# Patient Record
Sex: MALE | Race: WHITE | NOT HISPANIC OR LATINO | Employment: FULL TIME | URBAN - METROPOLITAN AREA
[De-identification: names, ages, dates, MRNs, and addresses within clinical notes are randomized per-mention and may not be internally consistent; named-entity substitution may affect disease eponyms.]

---

## 2024-08-13 ENCOUNTER — HOSPITAL ENCOUNTER (INPATIENT)
Facility: HOSPITAL | Age: 39
LOS: 7 days | Discharge: HOME | End: 2024-08-20
Attending: EMERGENCY MEDICINE | Admitting: STUDENT IN AN ORGANIZED HEALTH CARE EDUCATION/TRAINING PROGRAM
Payer: COMMERCIAL

## 2024-08-13 ENCOUNTER — APPOINTMENT (OUTPATIENT)
Dept: CT IMAGING | Facility: HOSPITAL | Age: 39
End: 2024-08-13
Payer: COMMERCIAL

## 2024-08-13 DIAGNOSIS — E87.6 HYPOKALEMIA: ICD-10-CM

## 2024-08-13 DIAGNOSIS — F10.930 ALCOHOL WITHDRAWAL SEIZURE WITHOUT COMPLICATION: Primary | ICD-10-CM

## 2024-08-13 DIAGNOSIS — E83.42 HYPOMAGNESEMIA: ICD-10-CM

## 2024-08-13 DIAGNOSIS — I48.91 ATRIAL FIBRILLATION WITH RAPID VENTRICULAR RESPONSE: ICD-10-CM

## 2024-08-13 DIAGNOSIS — R56.9 ALCOHOL WITHDRAWAL SEIZURE WITHOUT COMPLICATION: Primary | ICD-10-CM

## 2024-08-13 PROBLEM — F10.939 ALCOHOL WITHDRAWAL SEIZURE: Status: ACTIVE | Noted: 2024-08-13

## 2024-08-13 PROBLEM — F10.10 ETOH ABUSE: Status: ACTIVE | Noted: 2024-08-13

## 2024-08-13 LAB
ALBUMIN SERPL-MCNC: 3.7 G/DL (ref 3.5–5.2)
ALBUMIN/GLOB SERPL: 1.2 G/DL
ALP SERPL-CCNC: 98 U/L (ref 39–117)
ALT SERPL W P-5'-P-CCNC: 63 U/L (ref 1–41)
AMPHET+METHAMPHET UR QL: NEGATIVE
ANION GAP SERPL CALCULATED.3IONS-SCNC: 18 MMOL/L (ref 5–15)
ANION GAP SERPL CALCULATED.3IONS-SCNC: 9.6 MMOL/L (ref 5–15)
AST SERPL-CCNC: 84 U/L (ref 1–40)
BARBITURATES UR QL SCN: NEGATIVE
BASOPHILS # BLD AUTO: 0.02 10*3/MM3 (ref 0–0.2)
BASOPHILS NFR BLD AUTO: 0.3 % (ref 0–1.5)
BENZODIAZ UR QL SCN: NEGATIVE
BILIRUB SERPL-MCNC: 1.2 MG/DL (ref 0–1.2)
BUN SERPL-MCNC: 10 MG/DL (ref 6–20)
BUN SERPL-MCNC: 11 MG/DL (ref 6–20)
BUN/CREAT SERPL: 12.5 (ref 7–25)
BUN/CREAT SERPL: 13.4 (ref 7–25)
CALCIUM SPEC-SCNC: 8.7 MG/DL (ref 8.6–10.5)
CALCIUM SPEC-SCNC: 9.3 MG/DL (ref 8.6–10.5)
CANNABINOIDS SERPL QL: NEGATIVE
CHLORIDE SERPL-SCNC: 94 MMOL/L (ref 98–107)
CHLORIDE SERPL-SCNC: 99 MMOL/L (ref 98–107)
CK SERPL-CCNC: 232 U/L (ref 20–200)
CO2 SERPL-SCNC: 22 MMOL/L (ref 22–29)
CO2 SERPL-SCNC: 23.4 MMOL/L (ref 22–29)
COCAINE UR QL: NEGATIVE
CREAT SERPL-MCNC: 0.8 MG/DL (ref 0.76–1.27)
CREAT SERPL-MCNC: 0.82 MG/DL (ref 0.76–1.27)
DEPRECATED RDW RBC AUTO: 44.2 FL (ref 37–54)
EGFRCR SERPLBLD CKD-EPI 2021: 113.9 ML/MIN/1.73
EGFRCR SERPLBLD CKD-EPI 2021: 114.7 ML/MIN/1.73
EOSINOPHIL # BLD AUTO: 0.05 10*3/MM3 (ref 0–0.4)
EOSINOPHIL NFR BLD AUTO: 0.8 % (ref 0.3–6.2)
ERYTHROCYTE [DISTWIDTH] IN BLOOD BY AUTOMATED COUNT: 12.4 % (ref 12.3–15.4)
ETHANOL BLD-MCNC: <10 MG/DL (ref 0–10)
ETHANOL UR QL: <0.01 %
FENTANYL UR-MCNC: NEGATIVE NG/ML
GLOBULIN UR ELPH-MCNC: 3.2 GM/DL
GLUCOSE BLDC GLUCOMTR-MCNC: 121 MG/DL (ref 70–130)
GLUCOSE BLDC GLUCOMTR-MCNC: 136 MG/DL (ref 70–130)
GLUCOSE BLDC GLUCOMTR-MCNC: 137 MG/DL (ref 70–130)
GLUCOSE BLDC GLUCOMTR-MCNC: 146 MG/DL (ref 70–130)
GLUCOSE BLDC GLUCOMTR-MCNC: 146 MG/DL (ref 70–130)
GLUCOSE BLDC GLUCOMTR-MCNC: 160 MG/DL (ref 70–130)
GLUCOSE SERPL-MCNC: 160 MG/DL (ref 65–99)
GLUCOSE SERPL-MCNC: 215 MG/DL (ref 65–99)
HCT VFR BLD AUTO: 44.5 % (ref 37.5–51)
HGB BLD-MCNC: 15.1 G/DL (ref 13–17.7)
IMM GRANULOCYTES # BLD AUTO: 0.03 10*3/MM3 (ref 0–0.05)
IMM GRANULOCYTES NFR BLD AUTO: 0.5 % (ref 0–0.5)
LYMPHOCYTES # BLD AUTO: 0.79 10*3/MM3 (ref 0.7–3.1)
LYMPHOCYTES NFR BLD AUTO: 12.9 % (ref 19.6–45.3)
MAGNESIUM SERPL-MCNC: 1.2 MG/DL (ref 1.6–2.6)
MCH RBC QN AUTO: 32.8 PG (ref 26.6–33)
MCHC RBC AUTO-ENTMCNC: 33.9 G/DL (ref 31.5–35.7)
MCV RBC AUTO: 96.7 FL (ref 79–97)
METHADONE UR QL SCN: NEGATIVE
MONOCYTES # BLD AUTO: 0.75 10*3/MM3 (ref 0.1–0.9)
MONOCYTES NFR BLD AUTO: 12.3 % (ref 5–12)
NEUTROPHILS NFR BLD AUTO: 4.47 10*3/MM3 (ref 1.7–7)
NEUTROPHILS NFR BLD AUTO: 73.2 % (ref 42.7–76)
OPIATES UR QL: NEGATIVE
OXYCODONE UR QL SCN: NEGATIVE
PLATELET # BLD AUTO: 57 10*3/MM3 (ref 140–450)
PMV BLD AUTO: 10.9 FL (ref 6–12)
POTASSIUM SERPL-SCNC: 3.4 MMOL/L (ref 3.5–5.2)
POTASSIUM SERPL-SCNC: 4.4 MMOL/L (ref 3.5–5.2)
PROT SERPL-MCNC: 6.9 G/DL (ref 6–8.5)
QT INTERVAL: 319 MS
QT INTERVAL: 381 MS
QTC INTERVAL: 464 MS
QTC INTERVAL: 487 MS
RBC # BLD AUTO: 4.6 10*6/MM3 (ref 4.14–5.8)
SODIUM SERPL-SCNC: 132 MMOL/L (ref 136–145)
SODIUM SERPL-SCNC: 134 MMOL/L (ref 136–145)
WBC NRBC COR # BLD AUTO: 6.11 10*3/MM3 (ref 3.4–10.8)

## 2024-08-13 PROCEDURE — 80307 DRUG TEST PRSMV CHEM ANLYZR: CPT | Performed by: EMERGENCY MEDICINE

## 2024-08-13 PROCEDURE — 25010000002 THIAMINE HCL 200 MG/2ML SOLUTION 2 ML VIAL: Performed by: STUDENT IN AN ORGANIZED HEALTH CARE EDUCATION/TRAINING PROGRAM

## 2024-08-13 PROCEDURE — 82550 ASSAY OF CK (CPK): CPT | Performed by: EMERGENCY MEDICINE

## 2024-08-13 PROCEDURE — 83735 ASSAY OF MAGNESIUM: CPT | Performed by: EMERGENCY MEDICINE

## 2024-08-13 PROCEDURE — 85025 COMPLETE CBC W/AUTO DIFF WBC: CPT | Performed by: EMERGENCY MEDICINE

## 2024-08-13 PROCEDURE — 25010000002 ENOXAPARIN PER 10 MG: Performed by: STUDENT IN AN ORGANIZED HEALTH CARE EDUCATION/TRAINING PROGRAM

## 2024-08-13 PROCEDURE — 93010 ELECTROCARDIOGRAM REPORT: CPT | Performed by: INTERNAL MEDICINE

## 2024-08-13 PROCEDURE — 25010000002 LORAZEPAM PER 2 MG: Performed by: STUDENT IN AN ORGANIZED HEALTH CARE EDUCATION/TRAINING PROGRAM

## 2024-08-13 PROCEDURE — 82948 REAGENT STRIP/BLOOD GLUCOSE: CPT

## 2024-08-13 PROCEDURE — 70450 CT HEAD/BRAIN W/O DYE: CPT

## 2024-08-13 PROCEDURE — 25010000002 LORAZEPAM PER 2 MG: Performed by: EMERGENCY MEDICINE

## 2024-08-13 PROCEDURE — 82077 ASSAY SPEC XCP UR&BREATH IA: CPT | Performed by: EMERGENCY MEDICINE

## 2024-08-13 PROCEDURE — 63710000001 INSULIN GLARGINE PER 5 UNITS: Performed by: STUDENT IN AN ORGANIZED HEALTH CARE EDUCATION/TRAINING PROGRAM

## 2024-08-13 PROCEDURE — 25010000002 LEVETRIRACETAM PER 10 MG: Performed by: PSYCHIATRY & NEUROLOGY

## 2024-08-13 PROCEDURE — 99291 CRITICAL CARE FIRST HOUR: CPT

## 2024-08-13 PROCEDURE — 90791 PSYCH DIAGNOSTIC EVALUATION: CPT | Performed by: SOCIAL WORKER

## 2024-08-13 PROCEDURE — 80053 COMPREHEN METABOLIC PANEL: CPT | Performed by: STUDENT IN AN ORGANIZED HEALTH CARE EDUCATION/TRAINING PROGRAM

## 2024-08-13 PROCEDURE — 25010000002 PHENOBARBITAL PER 120 MG: Performed by: STUDENT IN AN ORGANIZED HEALTH CARE EDUCATION/TRAINING PROGRAM

## 2024-08-13 PROCEDURE — 25810000003 SODIUM CHLORIDE 0.9 % SOLUTION: Performed by: EMERGENCY MEDICINE

## 2024-08-13 PROCEDURE — 72125 CT NECK SPINE W/O DYE: CPT

## 2024-08-13 PROCEDURE — 25010000002 MAGNESIUM SULFATE IN D5W 1G/100ML (PREMIX) 1-5 GM/100ML-% SOLUTION: Performed by: STUDENT IN AN ORGANIZED HEALTH CARE EDUCATION/TRAINING PROGRAM

## 2024-08-13 PROCEDURE — 36415 COLL VENOUS BLD VENIPUNCTURE: CPT

## 2024-08-13 PROCEDURE — 93005 ELECTROCARDIOGRAM TRACING: CPT | Performed by: EMERGENCY MEDICINE

## 2024-08-13 PROCEDURE — 99222 1ST HOSP IP/OBS MODERATE 55: CPT | Performed by: PSYCHIATRY & NEUROLOGY

## 2024-08-13 RX ORDER — ACYCLOVIR 400 MG/1
TABLET ORAL
COMMUNITY
Start: 2024-07-17

## 2024-08-13 RX ORDER — LORAZEPAM 1 MG/1
1 TABLET ORAL
Status: ACTIVE | OUTPATIENT
Start: 2024-08-13 | End: 2024-08-18

## 2024-08-13 RX ORDER — DULOXETINE HCL 20 MG
1 CAPSULE,DELAYED RELEASE (ENTERIC COATED) ORAL EVERY 12 HOURS SCHEDULED
Status: ON HOLD | COMMUNITY
End: 2024-08-14

## 2024-08-13 RX ORDER — MAGNESIUM SULFATE 1 G/100ML
1 INJECTION INTRAVENOUS
Status: COMPLETED | OUTPATIENT
Start: 2024-08-13 | End: 2024-08-13

## 2024-08-13 RX ORDER — NICOTINE 21 MG/24HR
1 PATCH, TRANSDERMAL 24 HOURS TRANSDERMAL EVERY 24 HOURS
Status: DISCONTINUED | OUTPATIENT
Start: 2024-08-13 | End: 2024-08-20 | Stop reason: HOSPADM

## 2024-08-13 RX ORDER — LEVETIRACETAM 500 MG/1
1 TABLET ORAL EVERY 12 HOURS SCHEDULED
Status: ON HOLD | COMMUNITY
Start: 2024-07-08 | End: 2024-08-14

## 2024-08-13 RX ORDER — NICOTINE POLACRILEX 4 MG
15 LOZENGE BUCCAL
Status: DISCONTINUED | OUTPATIENT
Start: 2024-08-13 | End: 2024-08-20 | Stop reason: HOSPADM

## 2024-08-13 RX ORDER — FUROSEMIDE 40 MG
1 TABLET ORAL DAILY
Status: ON HOLD | COMMUNITY
End: 2024-08-14

## 2024-08-13 RX ORDER — POLYETHYLENE GLYCOL 3350 17 G/17G
17 POWDER, FOR SOLUTION ORAL DAILY PRN
Status: DISCONTINUED | OUTPATIENT
Start: 2024-08-13 | End: 2024-08-20 | Stop reason: HOSPADM

## 2024-08-13 RX ORDER — IBUPROFEN 600 MG/1
1 TABLET ORAL
Status: DISCONTINUED | OUTPATIENT
Start: 2024-08-13 | End: 2024-08-20 | Stop reason: HOSPADM

## 2024-08-13 RX ORDER — BISACODYL 5 MG/1
5 TABLET, DELAYED RELEASE ORAL DAILY PRN
Status: DISCONTINUED | OUTPATIENT
Start: 2024-08-13 | End: 2024-08-20 | Stop reason: HOSPADM

## 2024-08-13 RX ORDER — SODIUM CHLORIDE 0.9 % (FLUSH) 0.9 %
10 SYRINGE (ML) INJECTION EVERY 12 HOURS SCHEDULED
Status: DISCONTINUED | OUTPATIENT
Start: 2024-08-13 | End: 2024-08-20 | Stop reason: HOSPADM

## 2024-08-13 RX ORDER — PHENOBARBITAL SODIUM 65 MG/ML
65 INJECTION, SOLUTION INTRAMUSCULAR; INTRAVENOUS ONCE
Status: COMPLETED | OUTPATIENT
Start: 2024-08-14 | End: 2024-08-14

## 2024-08-13 RX ORDER — LEVOTHYROXINE SODIUM 112 UG/1
112 TABLET ORAL DAILY
COMMUNITY
Start: 2024-06-19

## 2024-08-13 RX ORDER — PREGABALIN 100 MG/1
300 CAPSULE ORAL 2 TIMES DAILY
Status: DISCONTINUED | OUTPATIENT
Start: 2024-08-13 | End: 2024-08-20 | Stop reason: HOSPADM

## 2024-08-13 RX ORDER — LEVETIRACETAM 250 MG/1
250 TABLET ORAL 2 TIMES DAILY
Status: DISCONTINUED | OUTPATIENT
Start: 2024-08-15 | End: 2024-08-15

## 2024-08-13 RX ORDER — LANOLIN ALCOHOL/MO/W.PET/CERES
1000 CREAM (GRAM) TOPICAL DAILY
Status: ON HOLD | COMMUNITY
End: 2024-08-14

## 2024-08-13 RX ORDER — LEVOTHYROXINE SODIUM 112 UG/1
112 TABLET ORAL DAILY
Status: DISCONTINUED | OUTPATIENT
Start: 2024-08-13 | End: 2024-08-20 | Stop reason: HOSPADM

## 2024-08-13 RX ORDER — MULTIPLE VITAMINS W/ MINERALS TAB 9MG-400MCG
1 TAB ORAL DAILY
Status: DISCONTINUED | OUTPATIENT
Start: 2024-08-13 | End: 2024-08-20 | Stop reason: HOSPADM

## 2024-08-13 RX ORDER — PHENOBARBITAL 32.4 MG/1
32.4 TABLET ORAL ONCE
Status: DISCONTINUED | OUTPATIENT
Start: 2024-08-16 | End: 2024-08-15

## 2024-08-13 RX ORDER — ONDANSETRON 4 MG/1
4 TABLET, ORALLY DISINTEGRATING ORAL EVERY 6 HOURS PRN
Status: DISCONTINUED | OUTPATIENT
Start: 2024-08-13 | End: 2024-08-20 | Stop reason: HOSPADM

## 2024-08-13 RX ORDER — BISACODYL 10 MG
10 SUPPOSITORY, RECTAL RECTAL DAILY PRN
Status: DISCONTINUED | OUTPATIENT
Start: 2024-08-13 | End: 2024-08-20 | Stop reason: HOSPADM

## 2024-08-13 RX ORDER — SODIUM CHLORIDE 0.9 % (FLUSH) 0.9 %
10 SYRINGE (ML) INJECTION AS NEEDED
Status: DISCONTINUED | OUTPATIENT
Start: 2024-08-13 | End: 2024-08-20 | Stop reason: HOSPADM

## 2024-08-13 RX ORDER — POTASSIUM CHLORIDE 750 MG/1
40 TABLET, FILM COATED, EXTENDED RELEASE ORAL ONCE
Status: COMPLETED | OUTPATIENT
Start: 2024-08-13 | End: 2024-08-13

## 2024-08-13 RX ORDER — LORAZEPAM 2 MG/ML
2 INJECTION INTRAMUSCULAR
Status: DISPENSED | OUTPATIENT
Start: 2024-08-13 | End: 2024-08-18

## 2024-08-13 RX ORDER — CLINDAMYCIN HCL 300 MG
CAPSULE ORAL
Status: ON HOLD | COMMUNITY
Start: 2024-06-26 | End: 2024-08-14

## 2024-08-13 RX ORDER — NITROGLYCERIN 0.4 MG/1
0.4 TABLET SUBLINGUAL
Status: DISCONTINUED | OUTPATIENT
Start: 2024-08-13 | End: 2024-08-20 | Stop reason: HOSPADM

## 2024-08-13 RX ORDER — MELOXICAM 15 MG/1
15 TABLET ORAL DAILY
Status: ON HOLD | COMMUNITY
Start: 2024-06-25 | End: 2024-08-14

## 2024-08-13 RX ORDER — THIAMINE HYDROCHLORIDE 100 MG/ML
200 INJECTION, SOLUTION INTRAMUSCULAR; INTRAVENOUS EVERY 8 HOURS SCHEDULED
Status: DISCONTINUED | OUTPATIENT
Start: 2024-08-16 | End: 2024-08-18

## 2024-08-13 RX ORDER — AMOXICILLIN 250 MG
2 CAPSULE ORAL 2 TIMES DAILY PRN
Status: DISCONTINUED | OUTPATIENT
Start: 2024-08-13 | End: 2024-08-13 | Stop reason: SDUPTHER

## 2024-08-13 RX ORDER — SITAGLIPTIN 50 MG/1
50 TABLET, FILM COATED ORAL DAILY
COMMUNITY
Start: 2024-08-05

## 2024-08-13 RX ORDER — AMOXICILLIN 250 MG
2 CAPSULE ORAL 2 TIMES DAILY PRN
Status: DISCONTINUED | OUTPATIENT
Start: 2024-08-13 | End: 2024-08-20 | Stop reason: HOSPADM

## 2024-08-13 RX ORDER — SODIUM CHLORIDE 9 MG/ML
40 INJECTION, SOLUTION INTRAVENOUS AS NEEDED
Status: DISCONTINUED | OUTPATIENT
Start: 2024-08-13 | End: 2024-08-20 | Stop reason: HOSPADM

## 2024-08-13 RX ORDER — FUROSEMIDE 20 MG
20 TABLET ORAL DAILY
Status: DISCONTINUED | OUTPATIENT
Start: 2024-08-13 | End: 2024-08-20 | Stop reason: HOSPADM

## 2024-08-13 RX ORDER — SPIRONOLACTONE 50 MG/1
1 TABLET, FILM COATED ORAL DAILY
COMMUNITY
Start: 2024-07-28

## 2024-08-13 RX ORDER — ENOXAPARIN SODIUM 100 MG/ML
40 INJECTION SUBCUTANEOUS DAILY
Status: DISCONTINUED | OUTPATIENT
Start: 2024-08-13 | End: 2024-08-20 | Stop reason: HOSPADM

## 2024-08-13 RX ORDER — PHENOBARBITAL 32.4 MG/1
32.4 TABLET ORAL ONCE
Status: DISCONTINUED | OUTPATIENT
Start: 2024-08-15 | End: 2024-08-15

## 2024-08-13 RX ORDER — LEVETIRACETAM 500 MG/5ML
500 INJECTION, SOLUTION, CONCENTRATE INTRAVENOUS EVERY 12 HOURS SCHEDULED
Status: COMPLETED | OUTPATIENT
Start: 2024-08-13 | End: 2024-08-14

## 2024-08-13 RX ORDER — BISACODYL 10 MG
10 SUPPOSITORY, RECTAL RECTAL DAILY PRN
Status: DISCONTINUED | OUTPATIENT
Start: 2024-08-13 | End: 2024-08-13 | Stop reason: SDUPTHER

## 2024-08-13 RX ORDER — BISACODYL 5 MG/1
5 TABLET, DELAYED RELEASE ORAL DAILY PRN
Status: DISCONTINUED | OUTPATIENT
Start: 2024-08-13 | End: 2024-08-13 | Stop reason: SDUPTHER

## 2024-08-13 RX ORDER — SODIUM CHLORIDE 9 MG/ML
100 INJECTION, SOLUTION INTRAVENOUS CONTINUOUS
Status: DISCONTINUED | OUTPATIENT
Start: 2024-08-13 | End: 2024-08-13

## 2024-08-13 RX ORDER — FOLIC ACID 1 MG/1
1 TABLET ORAL DAILY
Status: DISCONTINUED | OUTPATIENT
Start: 2024-08-13 | End: 2024-08-20 | Stop reason: HOSPADM

## 2024-08-13 RX ORDER — SPIRONOLACTONE 50 MG/1
50 TABLET, FILM COATED ORAL DAILY
Status: DISCONTINUED | OUTPATIENT
Start: 2024-08-13 | End: 2024-08-20 | Stop reason: HOSPADM

## 2024-08-13 RX ORDER — PREGABALIN 225 MG/1
1 CAPSULE ORAL EVERY 24 HOURS
Status: ON HOLD | COMMUNITY
End: 2024-08-14

## 2024-08-13 RX ORDER — AMITRIPTYLINE HYDROCHLORIDE 10 MG/1
10 TABLET ORAL NIGHTLY
Status: DISCONTINUED | OUTPATIENT
Start: 2024-08-13 | End: 2024-08-20 | Stop reason: HOSPADM

## 2024-08-13 RX ORDER — LORAZEPAM 2 MG/ML
1 INJECTION INTRAMUSCULAR
Status: DISPENSED | OUTPATIENT
Start: 2024-08-13 | End: 2024-08-18

## 2024-08-13 RX ORDER — DEXTROSE MONOHYDRATE 25 G/50ML
25 INJECTION, SOLUTION INTRAVENOUS
Status: DISCONTINUED | OUTPATIENT
Start: 2024-08-13 | End: 2024-08-20 | Stop reason: HOSPADM

## 2024-08-13 RX ORDER — NORTRIPTYLINE HCL 10 MG
10 CAPSULE ORAL NIGHTLY
COMMUNITY
Start: 2024-07-30

## 2024-08-13 RX ORDER — INSULIN ASPART 100 [IU]/ML
INJECTION, SOLUTION INTRAVENOUS; SUBCUTANEOUS
COMMUNITY
Start: 2024-07-31

## 2024-08-13 RX ORDER — METOPROLOL TARTRATE 25 MG/1
25 TABLET, FILM COATED ORAL ONCE
Status: COMPLETED | OUTPATIENT
Start: 2024-08-13 | End: 2024-08-13

## 2024-08-13 RX ORDER — AMITRIPTYLINE HYDROCHLORIDE 10 MG/1
10 TABLET ORAL NIGHTLY
Status: ON HOLD | COMMUNITY
Start: 2024-07-19 | End: 2024-08-15

## 2024-08-13 RX ORDER — PREGABALIN 300 MG/1
300 CAPSULE ORAL 2 TIMES DAILY
COMMUNITY

## 2024-08-13 RX ORDER — INSULIN GLARGINE 100 [IU]/ML
INJECTION, SOLUTION SUBCUTANEOUS
COMMUNITY

## 2024-08-13 RX ORDER — LORAZEPAM 2 MG/ML
1 INJECTION INTRAMUSCULAR ONCE
Status: COMPLETED | OUTPATIENT
Start: 2024-08-13 | End: 2024-08-13

## 2024-08-13 RX ORDER — LORAZEPAM 1 MG/1
2 TABLET ORAL
Status: ACTIVE | OUTPATIENT
Start: 2024-08-13 | End: 2024-08-18

## 2024-08-13 RX ORDER — CLONIDINE HYDROCHLORIDE 0.1 MG/1
0.1 TABLET ORAL EVERY 4 HOURS PRN
Status: DISCONTINUED | OUTPATIENT
Start: 2024-08-13 | End: 2024-08-20 | Stop reason: HOSPADM

## 2024-08-13 RX ORDER — POLYETHYLENE GLYCOL 3350 17 G/17G
17 POWDER, FOR SOLUTION ORAL DAILY PRN
Status: DISCONTINUED | OUTPATIENT
Start: 2024-08-13 | End: 2024-08-13 | Stop reason: SDUPTHER

## 2024-08-13 RX ORDER — ACETAMINOPHEN 325 MG/1
650 TABLET ORAL EVERY 4 HOURS PRN
Status: DISCONTINUED | OUTPATIENT
Start: 2024-08-13 | End: 2024-08-20 | Stop reason: HOSPADM

## 2024-08-13 RX ORDER — FUROSEMIDE 20 MG
20 TABLET ORAL DAILY
COMMUNITY
Start: 2024-06-21

## 2024-08-13 RX ORDER — SPIRONOLACTONE 100 MG/1
1 TABLET, FILM COATED ORAL DAILY
Status: ON HOLD | COMMUNITY
End: 2024-08-14

## 2024-08-13 RX ORDER — ONDANSETRON 2 MG/ML
4 INJECTION INTRAMUSCULAR; INTRAVENOUS EVERY 6 HOURS PRN
Status: DISCONTINUED | OUTPATIENT
Start: 2024-08-13 | End: 2024-08-20 | Stop reason: HOSPADM

## 2024-08-13 RX ADMIN — THIAMINE HYDROCHLORIDE 500 MG: 100 INJECTION, SOLUTION INTRAMUSCULAR; INTRAVENOUS at 07:12

## 2024-08-13 RX ADMIN — LORAZEPAM 2 MG: 2 INJECTION INTRAMUSCULAR; INTRAVENOUS at 11:20

## 2024-08-13 RX ADMIN — LORAZEPAM 2 MG: 2 INJECTION INTRAMUSCULAR; INTRAVENOUS at 13:27

## 2024-08-13 RX ADMIN — ENOXAPARIN SODIUM 40 MG: 100 INJECTION SUBCUTANEOUS at 15:26

## 2024-08-13 RX ADMIN — THIAMINE HYDROCHLORIDE 500 MG: 100 INJECTION, SOLUTION INTRAMUSCULAR; INTRAVENOUS at 21:12

## 2024-08-13 RX ADMIN — INSULIN GLARGINE 10 UNITS: 100 INJECTION, SOLUTION SUBCUTANEOUS at 20:34

## 2024-08-13 RX ADMIN — LORAZEPAM 2 MG: 2 INJECTION INTRAMUSCULAR; INTRAVENOUS at 10:29

## 2024-08-13 RX ADMIN — THIAMINE HYDROCHLORIDE 500 MG: 100 INJECTION, SOLUTION INTRAMUSCULAR; INTRAVENOUS at 16:54

## 2024-08-13 RX ADMIN — LORAZEPAM 2 MG: 2 INJECTION INTRAMUSCULAR; INTRAVENOUS at 12:17

## 2024-08-13 RX ADMIN — POTASSIUM CHLORIDE 40 MEQ: 750 TABLET, EXTENDED RELEASE ORAL at 03:53

## 2024-08-13 RX ADMIN — LEVETIRACETAM 500 MG: 100 INJECTION INTRAVENOUS at 20:34

## 2024-08-13 RX ADMIN — METOPROLOL TARTRATE 25 MG: 25 TABLET, FILM COATED ORAL at 03:57

## 2024-08-13 RX ADMIN — Medication 10 ML: at 20:38

## 2024-08-13 RX ADMIN — METOPROLOL TARTRATE 5 MG: 1 INJECTION, SOLUTION INTRAVENOUS at 03:58

## 2024-08-13 RX ADMIN — PHENOBARBITAL SODIUM 219.1 MG: 65 INJECTION, SOLUTION INTRAMUSCULAR; INTRAVENOUS at 15:26

## 2024-08-13 RX ADMIN — PHENOBARBITAL SODIUM 291.9 MG: 65 INJECTION, SOLUTION INTRAMUSCULAR; INTRAVENOUS at 11:51

## 2024-08-13 RX ADMIN — LORAZEPAM 2 MG: 2 INJECTION INTRAMUSCULAR; INTRAVENOUS at 19:37

## 2024-08-13 RX ADMIN — LORAZEPAM 2 MG: 2 INJECTION INTRAMUSCULAR; INTRAVENOUS at 23:05

## 2024-08-13 RX ADMIN — MAGNESIUM SULFATE IN DEXTROSE 1 G: 10 INJECTION, SOLUTION INTRAVENOUS at 10:12

## 2024-08-13 RX ADMIN — POTASSIUM CHLORIDE 40 MEQ: 750 TABLET, EXTENDED RELEASE ORAL at 08:04

## 2024-08-13 RX ADMIN — LORAZEPAM 2 MG: 2 INJECTION INTRAMUSCULAR; INTRAVENOUS at 10:06

## 2024-08-13 RX ADMIN — LORAZEPAM 1 MG: 2 INJECTION INTRAMUSCULAR; INTRAVENOUS at 03:11

## 2024-08-13 RX ADMIN — LORAZEPAM 2 MG: 2 INJECTION INTRAMUSCULAR; INTRAVENOUS at 17:20

## 2024-08-13 RX ADMIN — MAGNESIUM SULFATE IN DEXTROSE 1 G: 10 INJECTION, SOLUTION INTRAVENOUS at 12:40

## 2024-08-13 RX ADMIN — MAGNESIUM SULFATE IN DEXTROSE 1 G: 10 INJECTION, SOLUTION INTRAVENOUS at 07:17

## 2024-08-13 RX ADMIN — PHENOBARBITAL SODIUM 219.1 MG: 65 INJECTION, SOLUTION INTRAMUSCULAR; INTRAVENOUS at 20:32

## 2024-08-13 RX ADMIN — SODIUM CHLORIDE 1000 ML: 9 INJECTION, SOLUTION INTRAVENOUS at 03:14

## 2024-08-13 NOTE — ED NOTES
Nursing report ED to floor  Anthony Lerma  39 y.o.  male    HPI :  HPI (Adult)  Stated Reason for Visit: To ER via EMS from [facility].  Sent for seizure.  Nurse was next door and heard pt fall out of bed.  Pt appears to have bit tongue.  At [facility] for detox from ETOH.  Last drink 1 week ago.  Admitted on 8/12 to the [facility].     C/o slight pain to head from fall.    Chief Complaint  Chief Complaint   Patient presents with    Seizures       Admitting doctor:   Hayden Sol MD    Admitting diagnosis:   The primary encounter diagnosis was Alcohol withdrawal seizure without complication. Diagnoses of Hypokalemia, Hypomagnesemia, and Atrial fibrillation with rapid ventricular response were also pertinent to this visit.    Code status:   Current Code Status       Date Active Code Status Order ID Comments User Context       8/13/2024 0733 CPR (Attempt to Resuscitate) [id number]  Nicole Leblanc APRN ED        Question Answer    Code Status (Patient has no pulse and is not breathing) CPR (Attempt to Resuscitate)    Medical Interventions (Patient has pulse or is breathing) Full                    Allergies:   Patient has no known allergies.    Isolation:   No active isolations    Intake and Output    Intake/Output Summary (Last 24 hours) at 8/13/2024 1337  Last data filed at 8/13/2024 1201  Gross per 24 hour   Intake 1400 ml   Output --   Net 1400 ml       Weight:       08/13/24  0226   Weight: 96.6 kg (213 lb)       Most recent vitals:   Vitals:    08/13/24 0947 08/13/24 1028 08/13/24 1147 08/13/24 1210   BP:  (!) 161/101 153/93 151/96   BP Location:       Patient Position:       Pulse: 75 81 100 82   Resp:       Temp:       SpO2: 94% 96% 95% 94%   Weight:       Height:           Active LDAs/IV Access:   Lines, Drains & Airways       Active LDAs       Name Placement date Placement time Site Days    Peripheral IV 08/13/24 1128 Right Antecubital 08/13/24  1128  Antecubital  less than 1    Peripheral IV 08/13/24 1240  Anterior;Left Forearm 08/13/24  1240  Forearm  less than 1                    Labs (abnormal labs have a star):   Labs Reviewed   COMPREHENSIVE METABOLIC PANEL - Abnormal; Notable for the following components:       Result Value    Glucose 215 (*)     Sodium 134 (*)     Potassium 3.4 (*)     Chloride 94 (*)     ALT (SGPT) 63 (*)     AST (SGOT) 84 (*)     Anion Gap 18.0 (*)     All other components within normal limits    Narrative:     GFR Normal >60  Chronic Kidney Disease <60  Kidney Failure <15     CK - Abnormal; Notable for the following components:    Creatine Kinase 232 (*)     All other components within normal limits   CBC WITH AUTO DIFFERENTIAL - Abnormal; Notable for the following components:    Platelets 57 (*)     Lymphocyte % 12.9 (*)     Monocyte % 12.3 (*)     All other components within normal limits   MAGNESIUM - Abnormal; Notable for the following components:    Magnesium 1.2 (*)     All other components within normal limits   POCT GLUCOSE FINGERSTICK - Abnormal; Notable for the following components:    Glucose 160 (*)     All other components within normal limits   POCT GLUCOSE FINGERSTICK - Abnormal; Notable for the following components:    Glucose 137 (*)     All other components within normal limits   URINE DRUG SCREEN - Normal    Narrative:     Negative Thresholds Per Drugs Screened:    Amphetamines                 500 ng/ml  Barbiturates                 200 ng/ml  Benzodiazepines              100 ng/ml  Cocaine                      300 ng/ml  Methadone                    300 ng/ml  Opiates                      300 ng/ml  Oxycodone                    100 ng/ml  THC                           50 ng/ml  Fentanyl                       5 ng/ml      The Normal Value for all drugs tested is negative. This report includes final unconfirmed screening results to be used for medical treatment purposes only. Unconfirmed results must not be used for non-medical purposes such as employment or legal testing.  Clinical consideration should be applied to any drug of abuse test, particularly when unconfirmed results are used.           ETHANOL   POCT GLUCOSE FINGERSTICK   POCT GLUCOSE FINGERSTICK   POCT GLUCOSE FINGERSTICK   POCT GLUCOSE FINGERSTICK   CBC AND DIFFERENTIAL    Narrative:     The following orders were created for panel order CBC & Differential.  Procedure                               Abnormality         Status                     ---------                               -----------         ------                     CBC Auto Differential[[id number]]        Abnormal            Final result                 Please view results for these tests on the individual orders.       EKG:   ECG 12 Lead Rhythm Change   Final Result   HEART RATE=89  bpm   RR Lwevjyxv=003  ms   MO Jaxzhegq=418  ms   P Horizontal Axis=-19  deg   P Front Axis=40  deg   QRSD Interval=91  ms   QT Yaqjnywq=538  ms   ONuO=250  ms   QRS Axis=33  deg   T Wave Axis=40  deg   - ABNORMAL ECG -   Sinus rhythm   Left atrial enlargement   Low voltage, precordial leads   Atrial fibrillation has resolved   Electronically Signed By: Myesha Kohli ([facility]) 2024-08-13 10:25:05   Date and Time of Study:2024-08-13 05:17:43      ECG 12 Lead QT Measurement   Final Result   HEART IFHU=478  bpm   RR Jwerrlbe=386  ms   MO Interval=  ms   P Horizontal Axis=  deg   P Front Axis=  deg   QRSD Interval=92  ms   QT Zmxavcnb=978  ms   TJvW=855  ms   QRS Axis=42  deg   T Wave Axis=33  deg   - ABNORMAL ECG -   Atrial fibrillation   Abnormal R-wave progression, late transition   Borderline  prolonged QT interval   No change from previous tracing   Electronically Signed By: Myesha Kohli ([facility]) 2024-08-13 10:25:15   Date and Time of Study:2024-08-13 02:52:56          Meds given in ED:   Medications   sodium chloride 0.9 % flush 10 mL (has no administration in time range)   Magnesium Standard Dose Replacement - Follow Nurse / BPA Driven Protocol (has no administration in time  range)   thiamine (B-1) 500 mg in sodium chloride 0.9 % 100 mL IVPB (0 mg Intravenous Stopped 8/13/24 0809)     Followed by   thiamine (B-1) injection 200 mg (has no administration in time range)     Followed by   thiamine (VITAMIN B-1) tablet 100 mg (has no administration in time range)   folic acid (FOLVITE) tablet 1 mg (has no administration in time range)   nitroglycerin (NITROSTAT) SL tablet 0.4 mg (has no administration in time range)   sennosides-docusate (PERICOLACE) 8.6-50 MG per tablet 2 tablet (has no administration in time range)     And   polyethylene glycol (MIRALAX) packet 17 g (has no administration in time range)     And   bisacodyl (DULCOLAX) EC tablet 5 mg (has no administration in time range)     And   bisacodyl (DULCOLAX) suppository 10 mg (has no administration in time range)   ondansetron ODT (ZOFRAN-ODT) disintegrating tablet 4 mg (has no administration in time range)     Or   ondansetron (ZOFRAN) injection 4 mg (has no administration in time range)   Potassium Replacement - Follow Nurse / BPA Driven Protocol (has no administration in time range)   acetaminophen (TYLENOL) tablet 650 mg (has no administration in time range)   cloNIDine (CATAPRES) tablet 0.1 mg (has no administration in time range)   multivitamin with minerals 1 tablet (has no administration in time range)   LORazepam (ATIVAN) tablet 1 mg ( Oral Not Given:  See Alt 8/13/24 1327)     Or   LORazepam (ATIVAN) injection 1 mg ( Intravenous Not Given:  See Alt 8/13/24 1327)     Or   LORazepam (ATIVAN) tablet 2 mg ( Oral Not Given:  See Alt 8/13/24 1327)     Or   LORazepam (ATIVAN) injection 2 mg ( Intravenous Not Given:  See Alt 8/13/24 1327)     Or   LORazepam (ATIVAN) injection 2 mg (2 mg Intravenous Given 8/13/24 1327)     Or   LORazepam (ATIVAN) injection 2 mg ( Intramuscular Not Given:  See Alt 8/13/24 1327)   sodium chloride 0.9 % flush 10 mL (has no administration in time range)   sodium chloride 0.9 % flush 10 mL (has no  administration in time range)   sodium chloride 0.9 % infusion 40 mL (has no administration in time range)   Potassium Replacement - Follow Nurse / BPA Driven Protocol (has no administration in time range)   Magnesium Standard Dose Replacement - Follow Nurse / BPA Driven Protocol (has no administration in time range)   Phosphorus Replacement - Follow Nurse / BPA Driven Protocol (has no administration in time range)   Calcium Replacement - Follow Nurse / BPA Driven Protocol (has no administration in time range)   Enoxaparin Sodium (LOVENOX) syringe 40 mg (has no administration in time range)   nitroglycerin (NITROSTAT) SL tablet 0.4 mg (has no administration in time range)   nicotine (NICODERM CQ) 21 MG/24HR patch 1 patch (has no administration in time range)   nicotine polacrilex (NICORETTE) gum 4 mg (has no administration in time range)   levothyroxine (SYNTHROID, LEVOTHROID) tablet 112 mcg (has no administration in time range)   spironolactone (ALDACTONE) tablet 50 mg ( Oral Dose Auto Held 8/21/24 0900)   furosemide (LASIX) tablet 20 mg ( Oral Dose Auto Held 8/21/24 0900)   pregabalin (LYRICA) capsule 300 mg (has no administration in time range)   insulin glargine (LANTUS, SEMGLEE) injection 10 Units (has no administration in time range)   amitriptyline (ELAVIL) tablet 10 mg ( Oral Dose Auto Held 8/21/24 2100)   PHENobarbital 291.9 mg in sodium chloride 0.9 % 100 mL IVPB (0 mg Intravenous Stopped 8/13/24 1201)     Followed by   PHENobarbital 219.1 mg in sodium chloride 0.9 % 100 mL IVPB (has no administration in time range)     Followed by   PHENobarbital 219.1 mg in sodium chloride 0.9 % 100 mL IVPB (has no administration in time range)   PHENobarbital injection 65 mg (has no administration in time range)     Followed by   PHENobarbital injection 65 mg (has no administration in time range)     Followed by   PHENobarbital tablet 32.4 mg (has no administration in time range)     Followed by   PHENobarbital tablet  32.4 mg (has no administration in time range)     Followed by   PHENobarbital tablet 32.4 mg (has no administration in time range)   dextrose (GLUTOSE) oral gel 15 g (has no administration in time range)   dextrose (D50W) (25 g/50 mL) IV injection 25 g (has no administration in time range)   glucagon (GLUCAGEN) injection 1 mg (has no administration in time range)   insulin regular (humuLIN R,novoLIN R) injection 2-9 Units ( Subcutaneous Not Given 8/13/24 1254)   sodium chloride 0.9 % bolus 1,000 mL (0 mL Intravenous Stopped 8/13/24 0809)   LORazepam (ATIVAN) injection 1 mg (1 mg Intravenous Given 8/13/24 0311)   potassium chloride (K-DUR,KLOR-CON) ER tablet 40 mEq (40 mEq Oral Given 8/13/24 0353)   metoprolol tartrate (LOPRESSOR) injection 5 mg (5 mg Intravenous Given 8/13/24 0358)   metoprolol tartrate (LOPRESSOR) tablet 25 mg (25 mg Oral Given 8/13/24 0357)   potassium chloride (K-DUR,KLOR-CON) ER tablet 40 mEq (40 mEq Oral Given 8/13/24 0804)   magnesium sulfate in D5W 1g/100mL (PREMIX) (1 g Intravenous New Bag 8/13/24 1240)       Imaging results:  CT Cervical Spine Without Contrast    Result Date: 8/13/2024  Electronically signed by Álvaro Rutledge MD on 08-13-24 at 0515    CT Head Without Contrast    Result Date: 8/13/2024  Electronically signed by Álvaro Rutledge MD on 08-13-24 at 0514     Ambulatory status:   - bedrest    Social issues:   Social History     Socioeconomic History    Marital status:        Peripheral Neurovascular  Peripheral Neurovascular (Adult)  Peripheral Neurovascular WDL: WDL    Neuro Cognitive  Neuro Cognitive (Adult)  Cognitive/Neuro/Behavioral WDL: WDL, orientation  Orientation: oriented x 4  Jm Coma Scale  Best Eye Response: 4-->(E4) spontaneous  Best Motor Response: 6-->(M6) obeys commands  Best Verbal Response: 5-->(V5) oriented  Jm Coma Scale Score: 15  Seizure Assessment  Seizure Activity: witnessed  Seizure Presentation: stiffening, tonic rigidity  Seizure  Areas Involved: generalized  Seizure Movement: tonic, clonic    Learning  Learning Assessment (Adult)  Learning Readiness and Ability: no barriers identified  Education Provided  Person Taught: patient  Teaching Method: verbal instruction  Teaching Focus: symptom/problem overview, diagnostic test  Education Outcome Evaluation: verbalizes understanding    Respiratory  Respiratory WDL  Respiratory WDL: WDL, rhythm/pattern  Rhythm/Pattern, Respiratory: unlabored, pattern regular, depth regular, no shortness of breath reported    Abdominal Pain       Pain Assessments  Pain (Adult)  (0-10) Pain Rating: Rest: 3  Pain Location: head    NIH Stroke Scale       Bryna Sam RN  08/13/24 13:37 EDT

## 2024-08-13 NOTE — H&P
Patient Name:  Anthony Lerma  YOB: 1985  MRN:  [id number]  Admit Date:  8/13/2024  Patient Care Team:  Provider, No Known as PCP - General      Subjective   History Present Illness     Chief Complaint   Patient presents with    Seizures     This is a 39-year-old male with a past medical history of cirrhosis, type 2 diabetes, hypothyroidism presents the hospital after experiencing a seizure in the middle of the night.  He has been drinking quite often, but never really gave me a straight answer about how much he has been drinking.  He told his wife that he needed assistance with alcohol cessation and he was taken to the [facility] yesterday to detox from alcohol.  That is when he had a seizure and he was brought to the ER for further management.  His wife reports that his last drink was approximately 36 hours ago.    Despite ativan and phenobarbital he remains very agitated.       Personal History     History reviewed. No pertinent past medical history.  History reviewed. No pertinent surgical history.  History reviewed. No pertinent family history.     No current facility-administered medications on file prior to encounter.     Current Outpatient Medications on File Prior to Encounter   Medication Sig Dispense Refill    amitriptyline (ELAVIL) 10 MG tablet Take 1 tablet by mouth Every Night.      clindamycin (CLEOCIN) 300 MG capsule       Continuous Glucose Sensor (Dexcom G7 Sensor) misc APPLY ONE SENSOR TOPICALLY EVERY 10 DAYS      furosemide (LASIX) 20 MG tablet Take 1 tablet by mouth Daily.      insulin aspart (NovoLOG FlexPen) 100 UNIT/ML solution pen-injector sc pen 5 UNITS BEFORE SUPPER (IF B/G GREATER THAN 200 USE 5 UNITS FOR EACH 50MG ABOVE 200, MAX 50 UNITS DAILY.      Januvia 50 MG tablet Take 1 tablet by mouth Daily.      Lantus SoloStar 100 UNIT/ML injection pen INJECT 8 TO 12 UNITS SUBCUTANEOUSLY ONCE DAILY      levothyroxine (SYNTHROID, LEVOTHROID) 112 MCG tablet Take 1 tablet by mouth  Daily.      nortriptyline (PAMELOR) 10 MG capsule Take 1 capsule by mouth Daily.      pregabalin (LYRICA) 100 MG capsule Take 3 capsules by mouth 2 (Two) Times a Day.      pregabalin (Lyrica) 225 MG capsule 1 capsule Daily.      spironolactone (ALDACTONE) 50 MG tablet Take 1 tablet by mouth Daily.      Cymbalta 20 MG capsule 1 capsule Every 12 (Twelve) Hours.      furosemide (Lasix) 40 MG tablet Take 1 tablet by mouth Daily.      levETIRAcetam (KEPPRA) 500 MG tablet Take 1 tablet by mouth Every 12 (Twelve) Hours.      meloxicam (MOBIC) 15 MG tablet Take 1 tablet by mouth Daily.      spironolactone (ALDACTONE) 100 MG tablet Take 1 tablet by mouth Daily.      vitamin B-12 (CYANOCOBALAMIN) 1000 MCG tablet Take 1 tablet by mouth Daily.       No Known Allergies    Objective    Objective     Vital Signs  Temp:  [98.7 °F (37.1 °C)] 98.7 °F (37.1 °C)  Heart Rate:  [] 81  Resp:  [18-19] 18  BP: (137-161)/() 161/101  SpO2:  [91 %-96 %] 96 %  on  Flow (L/min):  [3] 3;   Device (Oxygen Therapy): room air  Body mass index is 30.56 kg/m².    Physical Exam  Constitutional:       General: He is in acute distress.      Appearance: He is ill-appearing.   HENT:      Head: Normocephalic and atraumatic.   Eyes:      Extraocular Movements: Extraocular movements intact.      Pupils: Pupils are equal, round, and reactive to light.   Cardiovascular:      Rate and Rhythm: Normal rate and regular rhythm.   Pulmonary:      Effort: Pulmonary effort is normal. No respiratory distress.   Abdominal:      General: There is no distension.      Palpations: Abdomen is soft.      Tenderness: There is no abdominal tenderness.   Skin:     Comments: Plethoric face   Neurological:      Mental Status: He is alert and oriented to person, place, and time.      Cranial Nerves: No cranial nerve deficit.         Results Review:  I reviewed the patient's new clinical results.  I reviewed the patient's new imaging results and agree with the  interpretation.  I reviewed the patient's other test results and agree with the interpretation  I personally viewed and interpreted the patient's EKG/Telemetry data  Discussed with ED provider.    Lab Results (last 24 hours)       Procedure Component Value Units Date/Time    CBC & Differential [[id number]]  (Abnormal) Collected: 08/13/24 0307    Specimen: Blood Updated: 08/13/24 0326    Narrative:      The following orders were created for panel order CBC & Differential.  Procedure                               Abnormality         Status                     ---------                               -----------         ------                     CBC Auto Differential[[id number]]        Abnormal            Final result                 Please view results for these tests on the individual orders.    Comprehensive Metabolic Panel [[id number]]  (Abnormal) Collected: 08/13/24 0307    Specimen: Blood Updated: 08/13/24 0337     Glucose 215 mg/dL      BUN 11 mg/dL      Creatinine 0.82 mg/dL      Sodium 134 mmol/L      Potassium 3.4 mmol/L      Chloride 94 mmol/L      CO2 22.0 mmol/L      Calcium 9.3 mg/dL      Total Protein 6.9 g/dL      Albumin 3.7 g/dL      ALT (SGPT) 63 U/L      AST (SGOT) 84 U/L      Alkaline Phosphatase 98 U/L      Total Bilirubin 1.2 mg/dL      Globulin 3.2 gm/dL      A/G Ratio 1.2 g/dL      BUN/Creatinine Ratio 13.4     Anion Gap 18.0 mmol/L      eGFR 113.9 mL/min/1.73     Narrative:      GFR Normal >60  Chronic Kidney Disease <60  Kidney Failure <15      Ethanol [[id number]] Collected: 08/13/24 0307    Specimen: Blood Updated: 08/13/24 0337     Ethanol <10 mg/dL      Ethanol % <0.010 %     CK [[id number]]  (Abnormal) Collected: 08/13/24 0307    Specimen: Blood Updated: 08/13/24 0337     Creatine Kinase 232 U/L     CBC Auto Differential [[id number]]  (Abnormal) Collected: 08/13/24 0307    Specimen: Blood Updated: 08/13/24 0326     WBC 6.11 10*3/mm3      RBC 4.60 10*6/mm3      Hemoglobin 15.1 g/dL       Hematocrit 44.5 %      MCV 96.7 fL      MCH 32.8 pg      MCHC 33.9 g/dL      RDW 12.4 %      RDW-SD 44.2 fl      MPV 10.9 fL      Platelets 57 10*3/mm3      Neutrophil % 73.2 %      Lymphocyte % 12.9 %      Monocyte % 12.3 %      Eosinophil % 0.8 %      Basophil % 0.3 %      Immature Grans % 0.5 %      Neutrophils, Absolute 4.47 10*3/mm3      Lymphocytes, Absolute 0.79 10*3/mm3      Monocytes, Absolute 0.75 10*3/mm3      Eosinophils, Absolute 0.05 10*3/mm3      Basophils, Absolute 0.02 10*3/mm3      Immature Grans, Absolute 0.03 10*3/mm3     Magnesium [[id number]]  (Abnormal) Collected: 08/13/24 0307    Specimen: Blood Updated: 08/13/24 0358     Magnesium 1.2 mg/dL     Urine Drug Screen - Urine, Clean Catch [[id number]]  (Normal) Collected: 08/13/24 0409    Specimen: Urine, Clean Catch Updated: 08/13/24 0445     Amphet/Methamphet, Screen Negative     Barbiturates Screen, Urine Negative     Benzodiazepine Screen, Urine Negative     Cocaine Screen, Urine Negative     Opiate Screen Negative     THC, Screen, Urine Negative     Methadone Screen, Urine Negative     Oxycodone Screen, Urine Negative     Fentanyl, Urine Negative    Narrative:      Negative Thresholds Per Drugs Screened:    Amphetamines                 500 ng/ml  Barbiturates                 200 ng/ml  Benzodiazepines              100 ng/ml  Cocaine                      300 ng/ml  Methadone                    300 ng/ml  Opiates                      300 ng/ml  Oxycodone                    100 ng/ml  THC                           50 ng/ml  Fentanyl                       5 ng/ml      The Normal Value for all drugs tested is negative. This report includes final unconfirmed screening results to be used for medical treatment purposes only. Unconfirmed results must not be used for non-medical purposes such as employment or legal testing. Clinical consideration should be applied to any drug of abuse test, particularly when unconfirmed results are used.            POC  Glucose Once [[id number]]  (Abnormal) Collected: 08/13/24 0807    Specimen: Blood Updated: 08/13/24 0808     Glucose 160 mg/dL             No results found for this or any previous visit.    Imaging Results (Last 24 Hours)       Procedure Component Value Units Date/Time    CT Cervical Spine Without Contrast [[id number]] Collected: 08/13/24 0516     Updated: 08/13/24 0516    Narrative:        Patient: MACKENZIE SANCHEZ  Time Out: 05:15  Exam(s): CT C SPINE     EXAM:    CT Cervical Spine Without Intravenous Contrast    CLINICAL HISTORY:     Reason for exam: seizure, head trauma.    TECHNIQUE:    Axial computed tomography images of the cervical spine without   intravenous contrast.  CTDI is 19.54 mGy and DLP is 452.8 mGy-cm.  This   CT exam was performed according to the principle of ALARA (As Low As   Reasonably Achievable) by using one or more of the following dose   reduction techniques: automated exposure control, adjustment of the mA   and or kV according to patient size, and or use of iterative   reconstruction technique.    COMPARISON:    No relevant prior studies available.    FINDINGS:    Artifacts:  Motion artifact limits evaluation.  Despite this, no   evidence of grossly displaced fracture or dislocation within the spine.    Consider repeat imaging if there is further concern.    Vertebrae:  Loss of cervical lordosis which can be seen with patient   positioning or muscle spasm.    Soft tissues:  Unremarkable.    IMPRESSION:       1.  Motion artifact limits evaluation.  Despite this, no evidence of   grossly displaced fracture or dislocation within the spine.  Consider   repeat imaging if there is further concern.  2.  Loss of cervical lordosis which can be seen with patient positioning   or muscle spasm.      Impression:          Electronically signed by Álvaro Rutledge MD on 08-13-24 at 0515    CT Head Without Contrast [[id number]] Collected: 08/13/24 0515     Updated: 08/13/24 0515    Narrative:         Patient: MACKENZIE SANCHEZ  Time Out: 05:14  Exam(s): CT HEAD Without Contrast     EXAM:    CT Head Without Intravenous Contrast    CLINICAL HISTORY:     Reason for exam: seizure, head trauma.    TECHNIQUE:    Axial computed tomography images of the head brain without intravenous   contrast.  CTDI is 55.96 mGy and DLP is 973.2 mGy-cm.  This CT exam was   performed according to the principle of ALARA (As Low As Reasonably   Achievable) by using one or more of the following dose reduction   techniques: automated exposure control, adjustment of the mA and or kV   according to patient size, and or use of iterative reconstruction   technique.    COMPARISON:    No relevant prior studies available.    FINDINGS:    Brain:  No acute intracranial abnormality.  Consider MRI if there is   further concern.  Areas of decreased attenuation in the deep cerebral   white matter are consistent with small vessel ischemic degenerative   changes.  The cerebral and cerebellar sulci are prominent consistent with   brain atrophy.  No hemorrhage.    Ventricles:  Unremarkable.  No ventriculomegaly.    Bones joints:  Unremarkable.  No acute fracture.    Soft tissues:  Unremarkable.    Vasculature:  Atherosclerotic disease.    Sinuses:  Unremarkable as visualized.    Mastoid air cells:  Unremarkable as visualized.  No mastoid effusion.    IMPRESSION:       1.  No acute intracranial abnormality.  Consider MRI if there is further   concern.  2.  Small vessel ischemic degenerative changes.  3.  Cerebral and cerebellar atrophy.      Impression:          Electronically signed by Álvaro Rutledge MD on 08-13-24 at 0514                ECG 12 Lead Rhythm Change   Final Result   HEART RATE=89  bpm   RR Sjdatiqi=429  ms   ND Reeomnaf=296  ms   P Horizontal Axis=-19  deg   P Front Axis=40  deg   QRSD Interval=91  ms   QT Cvefozrl=824  ms   YJnO=537  ms   QRS Axis=33  deg   T Wave Axis=40  deg   - ABNORMAL ECG -   Sinus rhythm   Left atrial enlargement    Low voltage, precordial leads   Atrial fibrillation has resolved   Electronically Signed By: Myesha Kohli ([facility]) 2024-08-13 10:25:05   Date and Time of Study:2024-08-13 05:17:43      ECG 12 Lead QT Measurement   Final Result   HEART RFPO=263  bpm   RR Rvyrvgsr=018  ms   KS Interval=  ms   P Horizontal Axis=  deg   P Front Axis=  deg   QRSD Interval=92  ms   QT Mnmqduen=379  ms   AVpG=493  ms   QRS Axis=42  deg   T Wave Axis=33  deg   - ABNORMAL ECG -   Atrial fibrillation   Abnormal R-wave progression, late transition   Borderline  prolonged QT interval   No change from previous tracing   Electronically Signed By: Myesha Kohli ([facility]) 2024-08-13 10:25:15   Date and Time of Study:2024-08-13 02:52:56                 Assessment/Plan     Active Hospital Problems    Diagnosis  POA    **Alcohol withdrawal seizure [F10.939, R56.9]  Yes    ETOH abuse [F10.10]  Yes      Resolved Hospital Problems   No resolved problems to display.     Alcohol withdrawal seizure  Alcohol abuse  Alcohol dependency  Starting on CIWA protocol.  Remains agitated despite frequent dosing of Ativan and phenobarbital.  Will put in for a critical care consult to evaluate for ICU transfer.    Cirrhosis secondary to alcohol  Transaminitis  thrombocytopenia  He takes Lasix and Aldactone 20/50.  Holding these 2 currently as he is not taking any fluids in.  Monitor renal function.  SCD for DVT ppx     Hypothyroidism  Resume home medications     I discussed the patient's findings and my recommendations with patient, family, and ED provider.    VTE Prophylaxis - SCDs.  Code Status - Full code.       Eric Lehman MD  [facility]  08/13/24  12:13 EDT

## 2024-08-13 NOTE — CONSULTS
"[facility] evaluate a 39-year-old male for alcohol abuse.  Patient was admitted to the [facility]  on 8/12 and had a seizure this morning in the middle of the night.  Patient was sent by EMS to [facility].  Patient is being admitted to the hospital.  Patient was at the [facility] for to detox from alcohol and benzos.  Patient talked about having another seizure in July of this year but it was not felt according to patient that it was from alcohol issues or withdrawal.  Patient's last drink was about a 1 week ago.  Patient states he is not a daily drinker but will binge drink, especially on the weekends and it might be a whole fifth of alcohol at a time.  Patient states he drank a lot in his 20s and 30s.  Patient's drug screen was negative.  Patient denies SI and denies any previous attempts.  Patient rates his current depressed mood as a 6/10 and states he does not have anxiety.  Patient did describe his craving as a 10/10.  Patient states his sleep is okay and appetite is \"great\".  Patient states he was in [facility] for 17 days back in 2021 due to a gallbladder infection that got into his belly.  Patient denies any previous treatment for alcohol abuse.  Patient denies blackouts and denies DUI.  Patient states he has been abusing alcohol since his early 20s.  Patient is on an antidepressant.    Patient lives in a house with his wife and 2 sons ages 8 and 11.  Patient states his 11-year-old son has numerous medical issues that include seizures and small strokes.  Patient works full-time and enjoys his job.  Patient stated his support system includes his wife his mother-in-law and he is very close to his father-in-law.  Patient also states he has supportive friends.  Access will  follow to continue to get further information on benzo use as well as give necessary resources for JOE care.  Currently, patient stating he does not want to go back to [facility] and would rather see a counselor with an expertise " in JOE treatment.

## 2024-08-13 NOTE — CONSULTS
"           CONSULT NOTE    Patient Identification:  Anthony Lerma  39 y.o.  male  1985  [id number]            Requesting physician: Dr Eric Lehman    Reason for Consultation:  critical care mgmt    CC: Seizure    History of Present Illness:  Patient is a 39-year-old with a previous medical history of cirrhosis and alcohol abuse who presented from rehab for alcohol withdrawal after seizure-like episode.  Patient subsequently was going through significant withdrawals and delirium tremens.  Subsequently was started on as needed benzodiazepines and phenobarbital.  Secondary to very high requirements and risk of seizures patient was admitted for critical care management.    Discussed with bedside nursing.    Discussed with the patient's wife who provides clinical history that the patient had a history of alcohol abuse however had been clean for a few years after diagnosis of cirrhosis.  He subsequently then admitted to her that he had relapsed and asked for help.  He then was admitted for alcohol treatment and presented as above.  He does have 1 history of a seizure that was associate with a hypoglycemic event had been on Keppra for a while underwent neurological evaluation not felt to warrant continued antiepileptics.  Patient is sedated unable to get reliable history.  Discussed with bedside nursing      Review of Systems:  Unable to obtain as patient is unarousable    Previous medical history of   cirrhosis   diabetes   Smoking    Previous surgical history noncontributory     (Not in a hospital admission)      No Known Allergies    Social History     Socioeconomic History    Marital status:    Lives in [facility]  Smokes.  Alcohol abuse    Family history unable to obtain    Physical Exam:  /94   Pulse 71   Temp 98.7 °F (37.1 °C)   Resp 18   Ht 177.8 cm (70\")   Wt 96.6 kg (213 lb)   SpO2 98%   BMI 30.56 kg/m²   Body mass index is 30.56 kg/m².   General appearance: Ill-appearing " drowsy  HENT: Atraumatic; oropharynx clear with moist mucous membranes   Neck: Trachea midline; supple  Lungs: CTA, with normal respiratory effort and no intercostal retractions  CV: RRR, no MRGs   Abdomen: Soft, nontender; no masses or HSM  Extremities: No peripheral edema or extremity lymphadenopathy  Skin: Normal temperature, turgor and texture; no rash, ulcers or subcutaneous nodules  Psych/neuro sedated unable to give any reliable history    LABS:  Results from last 7 days   Lab Units 08/13/24  0307   WBC 10*3/mm3 6.11   HEMOGLOBIN g/dL 15.1   PLATELETS 10*3/mm3 57*     Results from last 7 days   Lab Units 08/13/24  1344 08/13/24  0307   SODIUM mmol/L 132* 134*   POTASSIUM mmol/L 4.4 3.4*   CHLORIDE mmol/L 99 94*   CO2 mmol/L 23.4 22.0   BUN mg/dL 10 11   CREATININE mg/dL 0.80 0.82   GLUCOSE mg/dL 160* 215*   CALCIUM mg/dL 8.7 9.3   MAGNESIUM mg/dL  --  1.2*   Estimated Creatinine Clearance: 143.1 mL/min (by C-G formula based on SCr of 0.8 mg/dL).    Imaging: I personally visualized the images of scans/x-rays performed within last 3 days.  Imaging Results (Most Recent)       Procedure Component Value Units Date/Time    CT Cervical Spine Without Contrast [[id number]] Collected: 08/13/24 0516     Updated: 08/13/24 0516    Narrative:        Patient: MACKENZIE SANCHEZ  Time Out: 05:15  Exam(s): CT C SPINE     EXAM:    CT Cervical Spine Without Intravenous Contrast    CLINICAL HISTORY:     Reason for exam: seizure, head trauma.    TECHNIQUE:    Axial computed tomography images of the cervical spine without   intravenous contrast.  CTDI is 19.54 mGy and DLP is 452.8 mGy-cm.  This   CT exam was performed according to the principle of ALARA (As Low As   Reasonably Achievable) by using one or more of the following dose   reduction techniques: automated exposure control, adjustment of the mA   and or kV according to patient size, and or use of iterative   reconstruction technique.    COMPARISON:    No relevant prior studies  available.    FINDINGS:    Artifacts:  Motion artifact limits evaluation.  Despite this, no   evidence of grossly displaced fracture or dislocation within the spine.    Consider repeat imaging if there is further concern.    Vertebrae:  Loss of cervical lordosis which can be seen with patient   positioning or muscle spasm.    Soft tissues:  Unremarkable.    IMPRESSION:       1.  Motion artifact limits evaluation.  Despite this, no evidence of   grossly displaced fracture or dislocation within the spine.  Consider   repeat imaging if there is further concern.  2.  Loss of cervical lordosis which can be seen with patient positioning   or muscle spasm.      Impression:          Electronically signed by Álvaro Rutledge MD on 08-13-24 at 0515    CT Head Without Contrast [[id number]] Collected: 08/13/24 0515     Updated: 08/13/24 0515    Narrative:        Patient: MACKENZIE SANCHEZ  Time Out: 05:14  Exam(s): CT HEAD Without Contrast     EXAM:    CT Head Without Intravenous Contrast    CLINICAL HISTORY:     Reason for exam: seizure, head trauma.    TECHNIQUE:    Axial computed tomography images of the head brain without intravenous   contrast.  CTDI is 55.96 mGy and DLP is 973.2 mGy-cm.  This CT exam was   performed according to the principle of ALARA (As Low As Reasonably   Achievable) by using one or more of the following dose reduction   techniques: automated exposure control, adjustment of the mA and or kV   according to patient size, and or use of iterative reconstruction   technique.    COMPARISON:    No relevant prior studies available.    FINDINGS:    Brain:  No acute intracranial abnormality.  Consider MRI if there is   further concern.  Areas of decreased attenuation in the deep cerebral   white matter are consistent with small vessel ischemic degenerative   changes.  The cerebral and cerebellar sulci are prominent consistent with   brain atrophy.  No hemorrhage.    Ventricles:  Unremarkable.  No ventriculomegaly.     Bones joints:  Unremarkable.  No acute fracture.    Soft tissues:  Unremarkable.    Vasculature:  Atherosclerotic disease.    Sinuses:  Unremarkable as visualized.    Mastoid air cells:  Unremarkable as visualized.  No mastoid effusion.    IMPRESSION:       1.  No acute intracranial abnormality.  Consider MRI if there is further   concern.  2.  Small vessel ischemic degenerative changes.  3.  Cerebral and cerebellar atrophy.      Impression:          Electronically signed by Álvaro Rutledge MD on 08-13-24 at 0514            Assessment / Recommendations:  Alcohol withdrawal/  Treatments  Seizure associate with alcohol withdrawal  Alcohol abuse  Cirrhosis  Diabetes with hyperglycemia  Hyponatremia clinically significant  Thrombocytopenia      Admit to ICU  Consult to neurology for seizure management  Keppra 500 twice daily IV    Glargine 10  Sliding-scale insulin  Phenobarbital   CIWA protocol    Discussed with the patient's wife at bedside discussed with RN    Transfer to critical care  Monitor airway closely.    Total critical care time was 45 minutes, excluding any separately billable procedure time.  Time did not overlap with any other provider.       Bakari Renae MD  [facility]  08/13/24  14:49 EDT

## 2024-08-13 NOTE — PLAN OF CARE
Goal Outcome Evaluation: pt admitted to unit from ED at 1500. No family present. Restraints and sitter present. Rails padded for seizure precautions. MEGHAN in ue

## 2024-08-13 NOTE — ED NOTES
Pt unable to swallow liquid, pt needs one more dose of K . Pharm sol notified and reported a redraw to see where we are at if we need to do IV dose.

## 2024-08-13 NOTE — ED TRIAGE NOTES
To ER via EMS from [facility].  Sent for seizure.  Nurse was next door and heard pt fall out of bed.  Pt appears to have bit tongue.  At [facility] for detox from ETOH.  Last drink 1 week ago.  Admitted on 8/12 to the [facility].     C/o slight pain to head from fall.

## 2024-08-13 NOTE — ED NOTES
This RN has talked to pt multiple time about leaving cardiac monitor in place, pt pulled out IV in L AC. Pt continues to try and get up and pull at lines. Pt is very unsteady on feet.  Bed alarm has been placed, TV has been turned on with no improvements. MD Lehman notified of pt condition. Medication ordered, restraints ordered, upgraded pts bed status.

## 2024-08-13 NOTE — CONSULTS
Neurology Consult Note    Referring Provider: Dr. Renae  Reason for Consultation: seizure    History of present illness:    The patient is a 39 year old man who [facility] withdrawal and seizure like activity.    Per wife's report, patient has had a seizure in the past associated with hypoglycemia and was on Keppra for awhile.     He has been admistted to ICU. Keppra 500 mg IV q12 hours and IV phenobarbital with taper have been started.    IV thiamine started.    Magnesium level 1.2, supplemented with I gram magnesium sulfate IV.     Past Medical History  Cirrhosis, alcohol use disorder    Past Surgical History  History reviewed. No pertinent surgical history.    Family History  History reviewed. No pertinent family history.    No Known Allergies    Social History  , alcohol use disorder      Review of Systems   Unable to perform ROS: Mental status change       Medications  Scheduled Meds:[Held by provider] amitriptyline, 10 mg, Oral, Nightly  enoxaparin, 40 mg, Subcutaneous, Daily  folic acid, 1 mg, Oral, Daily  [Held by provider] furosemide, 20 mg, Oral, Daily  insulin glargine, 10 Units, Subcutaneous, Nightly  insulin regular, 2-9 Units, Subcutaneous, Q6H  levETIRAcetam, 500 mg, Intravenous, Q12H  levothyroxine, 112 mcg, Oral, Daily  multivitamin with minerals, 1 tablet, Oral, Daily  nicotine, 1 patch, Transdermal, Q24H  PHENobarbital, 3 mg/kg (Ideal), Intravenous, Once  [START ON 8/14/2024] PHENobarbital, 65 mg, Intravenous, Once   Followed by  [START ON 8/14/2024] PHENobarbital, 65 mg, Intravenous, Once   Followed by  [START ON 8/15/2024] PHENobarbital, 32.4 mg, Oral, Once   Followed by  [START ON 8/15/2024] PHENobarbital, 32.4 mg, Oral, Once   Followed by  [START ON 8/16/2024] PHENobarbital, 32.4 mg, Oral, Once  pregabalin, 300 mg, Oral, BID  sodium chloride, 10 mL, Intravenous, Q12H  [Held by provider] spironolactone, 50 mg, Oral, Daily  thiamine (B-1) IV, 500 mg,  Intravenous, Q8H   Followed by  [START ON 8/16/2024] thiamine (B-1) IV, 200 mg, Intravenous, Q8H   Followed by  [START ON 8/20/2024] thiamine, 100 mg, Oral, Daily      Continuous Infusions:   PRN Meds:.  acetaminophen    senna-docusate sodium **AND** polyethylene glycol **AND** bisacodyl **AND** bisacodyl    Calcium Replacement - Follow Nurse / BPA Driven Protocol    cloNIDine    dextrose    dextrose    glucagon (human recombinant)    LORazepam **OR** LORazepam **OR** LORazepam **OR** LORazepam **OR** LORazepam **OR** LORazepam    Magnesium Standard Dose Replacement - Follow Nurse / BPA Driven Protocol    Magnesium Standard Dose Replacement - Follow Nurse / BPA Driven Protocol    nicotine polacrilex    nitroglycerin    nitroglycerin    ondansetron ODT **OR** ondansetron    Phosphorus Replacement - Follow Nurse / BPA Driven Protocol    Potassium Replacement - Follow Nurse / BPA Driven Protocol    Potassium Replacement - Follow Nurse / BPA Driven Protocol    [COMPLETED] Insert Peripheral IV **AND** sodium chloride    sodium chloride    sodium chloride    Vital Signs   Temp:  [97.7 °F (36.5 °C)-98.7 °F (37.1 °C)] 97.7 °F (36.5 °C)  Heart Rate:  [] 70  Resp:  [18-19] 18  BP: (137-161)/() 147/91    Examination:  Constitutional: Well-groomed, well-nourished  HENT:  normal  Eyes: Normal conjunctivae  CVS:  Regular rate and rhythm.  No murmurs.  Good peripheral perfusion.   Resp :   Nonlabored respirations  Musculoskeletal:  No signs of peripheral edema, normal range, no deformities  Skin:  No rash, normal turgor  Neurologic:    Lethargic, difficult to arouse, eventually does so   Follows one step commands  EOMF without nystagmus  Pupils symmetric and equally reactive  Face symmetric  Localizes in all extremities  Reflexes symmetric and not hyperactive  Plantar responses flexor  Gait not tested  Psychiatric: No agitation    Results Review:  Results from last 7 days   Lab Units 08/13/24  0307   WBC 10*3/mm3 6.11    HEMOGLOBIN g/dL 15.1   HEMATOCRIT % 44.5   PLATELETS 10*3/mm3 57*        Results from last 7 days   Lab Units 08/13/24  1344 08/13/24  0307   SODIUM mmol/L 132* 134*   POTASSIUM mmol/L 4.4 3.4*   CHLORIDE mmol/L 99 94*   CO2 mmol/L 23.4 22.0   BUN mg/dL 10 11   CREATININE mg/dL 0.80 0.82   CALCIUM mg/dL 8.7 9.3   BILIRUBIN mg/dL  --  1.2   ALK PHOS U/L  --  98   ALT (SGPT) U/L  --  63*   AST (SGOT) U/L  --  84*   GLUCOSE mg/dL 160* 215*      Magnesium  1.2    Radiology  Head CT shows no acute pathology   Images reviewed independently      Medical Decision Making and Recommendations  Seizure  Secondary to alcohol withdrawal and hypomagnesemia    Continue Keppra for short term  Taper off after 3 days, order entered.     Supplement magnesium to level 2.0. Order for AM check in place.     No indication for advanced imaging of brain or for EEG at this time.    Alcohol withdrawal protocol intensivist.     I discussed these findings and my recommendations with nursing staff    Neurology signing off, please call if needed further.    Jeanie Hall MD  08/13/24  16:01 EDT

## 2024-08-13 NOTE — ED PROVIDER NOTES
EMERGENCY DEPARTMENT ENCOUNTER  Room Number:  14/14  PCP: Provider, No Known  Independent Historians: Patient, liaison from the [facility]      HPI:  Chief Complaint: had concerns including Seizures.     A complete HPI/ROS/PMH/PSH/SH/FH are unobtainable due to: Nothing      Context: Anthony Lerma is a 39 y.o. male with a medical history of cirrhosis, diabetes, alcohol dependence who presents to the ED c/o acute seizure.  Patient was at the [facility] undergoing alcohol detoxification and rehabilitation.  He had an unwitnessed seizure.  Staff heard a scream and then heard him fall.  He was noted to have a hematoma on the back of his neck and also the back of his head.  Patient denies prior history of seizures.  He states that he was not a daily drinker but that he was encouraged by family to get help for his problem with alcohol.  He cannot really recall when his last drink of alcohol was.    Patient also recently passed out while he was on a cruise around 4 July.  He states that he hit his head at that time.  He was not drinking alcohol at the time and he does not believe that he had a seizure then.    He denies chest pain or shortness of breath.  He does complain of dry mouth and mild headache and neck pain.      Review of prior external notes (non-ED) -and- Review of prior external test results outside of this encounter: I reviewed internal medicine visit from 7/30/2024.  Patient was seen in follow-up for diabetes, hypothyroidism, hypertension, hyperlipidemia.        PAST MEDICAL HISTORY  Active Ambulatory Problems     Diagnosis Date Noted    No Active Ambulatory Problems     Resolved Ambulatory Problems     Diagnosis Date Noted    No Resolved Ambulatory Problems     No Additional Past Medical History         PAST SURGICAL HISTORY  No past surgical history on file.      FAMILY HISTORY  No family history on file.      SOCIAL HISTORY  Social History     Socioeconomic History    Marital status:           ALLERGIES  Patient has no known allergies.      REVIEW OF SYSTEMS  Review of all 14 systems is negative other than stated in the HPI above.      PHYSICAL EXAM    I have reviewed the triage vital signs and nursing notes.    ED Triage Vitals   Temp Heart Rate Resp BP SpO2   08/13/24 0229 08/13/24 0226 08/13/24 0226 08/13/24 0226 08/13/24 0226   98.7 °F (37.1 °C) (!) 127 19 141/68 94 %      Temp src Heart Rate Source Patient Position BP Location FiO2 (%)   -- -- -- -- --                GENERAL: awake and alert, no acute distress, slightly diaphoretic  HENT: Normocephalic, small occipital scalp hematoma, larger hematoma over the lower cervical spine, mild C-spine tenderness.  Superficial tongue abrasion with ecchymosis present  EYES: no scleral icterus, pupils 3 mm reactive to lateral, EOMI  CV: regular rhythm, tachycardic  RESPIRATORY: normal effort  ABDOMEN: soft, nondistended, nontender throughout  MUSCULOSKELETAL: no deformity  NEURO: alert, moves all extremities, follows commands, GCS 15, cranial nerves II through XII intact, speech fluent and clear  PSYCH: calm, cooperative  SKIN: Warm, dry          LAB RESULTS  Recent Results (from the past 24 hour(s))   ECG 12 Lead QT Measurement    Collection Time: 08/13/24  2:52 AM   Result Value Ref Range    QT Interval 319 ms    QTC Interval 487 ms   Comprehensive Metabolic Panel    Collection Time: 08/13/24  3:07 AM    Specimen: Blood   Result Value Ref Range    Glucose 215 (H) 65 - 99 mg/dL    BUN 11 6 - 20 mg/dL    Creatinine 0.82 0.76 - 1.27 mg/dL    Sodium 134 (L) 136 - 145 mmol/L    Potassium 3.4 (L) 3.5 - 5.2 mmol/L    Chloride 94 (L) 98 - 107 mmol/L    CO2 22.0 22.0 - 29.0 mmol/L    Calcium 9.3 8.6 - 10.5 mg/dL    Total Protein 6.9 6.0 - 8.5 g/dL    Albumin 3.7 3.5 - 5.2 g/dL    ALT (SGPT) 63 (H) 1 - 41 U/L    AST (SGOT) 84 (H) 1 - 40 U/L    Alkaline Phosphatase 98 39 - 117 U/L    Total Bilirubin 1.2 0.0 - 1.2 mg/dL    Globulin 3.2 gm/dL    A/G Ratio 1.2 g/dL     BUN/Creatinine Ratio 13.4 7.0 - 25.0    Anion Gap 18.0 (H) 5.0 - 15.0 mmol/L    eGFR 113.9 >60.0 mL/min/1.73   Ethanol    Collection Time: 08/13/24  3:07 AM    Specimen: Blood   Result Value Ref Range    Ethanol <10 0 - 10 mg/dL    Ethanol % <0.010 %   CK    Collection Time: 08/13/24  3:07 AM    Specimen: Blood   Result Value Ref Range    Creatine Kinase 232 (H) 20 - 200 U/L   CBC Auto Differential    Collection Time: 08/13/24  3:07 AM    Specimen: Blood   Result Value Ref Range    WBC 6.11 3.40 - 10.80 10*3/mm3    RBC 4.60 4.14 - 5.80 10*6/mm3    Hemoglobin 15.1 13.0 - 17.7 g/dL    Hematocrit 44.5 37.5 - 51.0 %    MCV 96.7 79.0 - 97.0 fL    MCH 32.8 26.6 - 33.0 pg    MCHC 33.9 31.5 - 35.7 g/dL    RDW 12.4 12.3 - 15.4 %    RDW-SD 44.2 37.0 - 54.0 fl    MPV 10.9 6.0 - 12.0 fL    Platelets 57 (L) 140 - 450 10*3/mm3    Neutrophil % 73.2 42.7 - 76.0 %    Lymphocyte % 12.9 (L) 19.6 - 45.3 %    Monocyte % 12.3 (H) 5.0 - 12.0 %    Eosinophil % 0.8 0.3 - 6.2 %    Basophil % 0.3 0.0 - 1.5 %    Immature Grans % 0.5 0.0 - 0.5 %    Neutrophils, Absolute 4.47 1.70 - 7.00 10*3/mm3    Lymphocytes, Absolute 0.79 0.70 - 3.10 10*3/mm3    Monocytes, Absolute 0.75 0.10 - 0.90 10*3/mm3    Eosinophils, Absolute 0.05 0.00 - 0.40 10*3/mm3    Basophils, Absolute 0.02 0.00 - 0.20 10*3/mm3    Immature Grans, Absolute 0.03 0.00 - 0.05 10*3/mm3   Magnesium    Collection Time: 08/13/24  3:07 AM    Specimen: Blood   Result Value Ref Range    Magnesium 1.2 (L) 1.6 - 2.6 mg/dL   Urine Drug Screen - Urine, Clean Catch    Collection Time: 08/13/24  4:09 AM    Specimen: Urine, Clean Catch   Result Value Ref Range    Amphet/Methamphet, Screen Negative Negative    Barbiturates Screen, Urine Negative Negative    Benzodiazepine Screen, Urine Negative Negative    Cocaine Screen, Urine Negative Negative    Opiate Screen Negative Negative    THC, Screen, Urine Negative Negative    Methadone Screen, Urine Negative Negative    Oxycodone Screen, Urine  Negative Negative    Fentanyl, Urine Negative Negative   ECG 12 Lead Rhythm Change    Collection Time: 08/13/24  5:17 AM   Result Value Ref Range    QT Interval 381 ms    QTC Interval 464 ms       The above labs were ordered by me and independently reviewed by me.     RADIOLOGY  CT Cervical Spine Without Contrast    Result Date: 8/13/2024  Patient: MACKENZIE SANCHEZ  Time Out: 05:15 Exam(s): CT C SPINE EXAM:   CT Cervical Spine Without Intravenous Contrast CLINICAL HISTORY:    Reason for exam: seizure, head trauma. TECHNIQUE:   Axial computed tomography images of the cervical spine without intravenous contrast.  CTDI is 19.54 mGy and DLP is 452.8 mGy-cm.  This CT exam was performed according to the principle of ALARA (As Low As Reasonably Achievable) by using one or more of the following dose reduction techniques: automated exposure control, adjustment of the mA and or kV according to patient size, and or use of iterative reconstruction technique. COMPARISON:   No relevant prior studies available. FINDINGS:   Artifacts:  Motion artifact limits evaluation.  Despite this, no evidence of grossly displaced fracture or dislocation within the spine.  Consider repeat imaging if there is further concern.   Vertebrae:  Loss of cervical lordosis which can be seen with patient positioning or muscle spasm.   Soft tissues:  Unremarkable. IMPRESSION:     1.  Motion artifact limits evaluation.  Despite this, no evidence of grossly displaced fracture or dislocation within the spine.  Consider repeat imaging if there is further concern. 2.  Loss of cervical lordosis which can be seen with patient positioning or muscle spasm.     Electronically signed by Álvaro Rutledge MD on 08-13-24 at 0515    CT Head Without Contrast    Result Date: 8/13/2024  Patient: MACKENZIE SANCHEZ  Time Out: 05:14 Exam(s): CT HEAD Without Contrast EXAM:   CT Head Without Intravenous Contrast CLINICAL HISTORY:    Reason for exam: seizure, head trauma. TECHNIQUE:    Axial computed tomography images of the head brain without intravenous contrast.  CTDI is 55.96 mGy and DLP is 973.2 mGy-cm.  This CT exam was performed according to the principle of ALARA (As Low As Reasonably Achievable) by using one or more of the following dose reduction techniques: automated exposure control, adjustment of the mA and or kV according to patient size, and or use of iterative reconstruction technique. COMPARISON:   No relevant prior studies available. FINDINGS:   Brain:  No acute intracranial abnormality.  Consider MRI if there is further concern.  Areas of decreased attenuation in the deep cerebral white matter are consistent with small vessel ischemic degenerative changes.  The cerebral and cerebellar sulci are prominent consistent with brain atrophy.  No hemorrhage.   Ventricles:  Unremarkable.  No ventriculomegaly.   Bones joints:  Unremarkable.  No acute fracture.   Soft tissues:  Unremarkable.   Vasculature:  Atherosclerotic disease.   Sinuses:  Unremarkable as visualized.   Mastoid air cells:  Unremarkable as visualized.  No mastoid effusion. IMPRESSION:     1.  No acute intracranial abnormality.  Consider MRI if there is further concern. 2.  Small vessel ischemic degenerative changes. 3.  Cerebral and cerebellar atrophy.     Electronically signed by Álvaro Rutledge MD on 08-13-24 at 0514     The above radiology studies were ordered by me.  See ED course for independent interpretations.     MEDICATIONS GIVEN IN ER  Medications   sodium chloride 0.9 % flush 10 mL (has no administration in time range)   sodium chloride 0.9 % bolus 1,000 mL (1,000 mL Intravenous New Bag 8/13/24 0314)   LORazepam (ATIVAN) injection 1 mg (1 mg Intravenous Given 8/13/24 0311)   potassium chloride (K-DUR,KLOR-CON) ER tablet 40 mEq (40 mEq Oral Given 8/13/24 0353)   metoprolol tartrate (LOPRESSOR) injection 5 mg (5 mg Intravenous Given 8/13/24 0358)   metoprolol tartrate (LOPRESSOR) tablet 25 mg (25 mg Oral Given  8/13/24 0357)         ORDERS PLACED DURING THIS VISIT:  Orders Placed This Encounter   Procedures    CT Head Without Contrast    CT Cervical Spine Without Contrast    Comprehensive Metabolic Panel    Ethanol    Urine Drug Screen - Urine, Clean Catch    CK    CBC Auto Differential    Magnesium    Continuous Pulse Oximetry    Monitor Blood Pressure    [facility] (on-call MD unless specified) Details    ECG 12 Lead QT Measurement    ECG 12 Lead Rhythm Change    Insert Peripheral IV    Inpatient Admission    Seizure Precautions    CBC & Differential         OUTPATIENT MEDICATION MANAGEMENT:  Current Facility-Administered Medications Ordered in Epic   Medication Dose Route Frequency Provider Last Rate Last Admin    sodium chloride 0.9 % flush 10 mL  10 mL Intravenous PRN Steve Ramírez MD         No current [facility]-ordered outpatient medications on file.         PROCEDURES  Procedures      Critical care provider statement:    Critical care time (minutes): 30.   Critical care time was exclusive of:  Separately billable procedures and treating other patients   Critical care was necessary to treat or prevent imminent or life-threatening deterioration of the following conditions:  CNS Failure   Critical care was time spent personally by me on the following activities:  Development of treatment plan with patient or surrogate, discussions with consultants, evaluation of patient's response to treatment, examination of patient, obtaining history from patient or surrogate, ordering and performing treatments and interventions, ordering and review of laboratory studies, ordering and review of radiographic studies, pulse oximetry, re-evaluation of patient's condition and review of old charts. Critical Care indicators: Seizure, new onset or with disorder with intensive drug management       PROGRESS, DATA ANALYSIS, CONSULTS, AND MEDICAL DECISION MAKING  All labs have been independently interpreted by me.  All radiology studies have  been reviewed by me. All EKG's have been independently viewed and interpreted by me.  Discussion below represents my analysis of pertinent findings related to patient's condition, differential diagnosis, treatment plan and final disposition.    Differential diagnosis includes but is not limited to:  Epilepsy  Alcohol withdrawal seizure  Traumatic intracranial hemorrhage  Traumatic cervical spine fracture      Clinical Scores:                  ED Course as of 08/13/24 0535   Tue Aug 13, 2024   0343 EKG          EKG time: 2:52 AM  Rhythm/Rate: A-fib, 140  P waves and ME: N/A  QRS, axis: Normal axis  ST and T waves: No acute ischemic changes    Interpreted Contemporaneously by me, independently viewed         [JR]   0344 Platelets(!): 57 [JR]   0512 Magnesium(!): 1.2 [JR]   0513 CT brain and cervical spine independently interpreted in PACS and demonstrates no acute intracranial hemorrhage.  There is subcutaneous hematoma present posterior to the cervical spine without acute cervical spine fracture. [JR]   0532 EKG          EKG time: 5:17 AM  Rhythm/Rate: Sinus rhythm, 89  P waves and ME: Normal  QRS, axis: Normal axis  ST and T waves: No acute ischemic changes    Interpreted Contemporaneously by me, independently viewed  Similar compared to prior earlier today however no longer appears to be in atrial fibrillation and rate is slower       [JR]   2729 Discussed with MILY Rivera for [facility], who agrees to admit on behalf of Dr. Sol.   [JR]   3166 Patient with history of alcohol dependence, cirrhosis, presents from the [facility] where he was undergoing alcohol detoxification for new onset seizure.  He did hit the back of his head but he has no evidence of acute intracranial hemorrhage.  He was given Ativan here.  He was tachycardic and appeared to be in atrial fibrillation on EKG for which she was given 5 mg IV Lopressor.  On repeat EKG he now appears to be in sinus rhythm with rate in the 80s.  Considering his alcohol  withdrawal seizure, I think that he would benefit from further alcohol detoxification in the hospital. [JR]      ED Course User Index  [JR] Steve Ramírez MD             AS OF 05:35 EDT VITALS:    BP - 145/91  HR - 109  TEMP - 98.7 °F (37.1 °C)  O2 SATS - 93%    COMPLEXITY OF CARE  The patient requires admission.      Chronic or social conditions impacting patient care (Social Determinants of Health):     DIAGNOSIS  Final diagnoses:   Alcohol withdrawal seizure without complication   Hypokalemia   Hypomagnesemia   Atrial fibrillation with rapid ventricular response           DISPOSITION  Admit      Prescription drug monitoring program review:           Please note that portions of this document were completed with a voice recognition program.    Note Disclaimer: At [facility], we believe that sharing information builds trust and better relationships. You are receiving this note because you recently visited [facility]. It is possible you will see health information before a provider has talked with you about it. This kind of information can be easy to misunderstand. To help you fully understand what it means for your health, we urge you to discuss this note with your provider.         Steev Ramírez MD  08/13/24 0535

## 2024-08-13 NOTE — ED TRIAGE NOTES
Kimi with the [facility] reports that the Pt is at her facility for detox from alcohol and benzos. Pt reportedly had a unwitnessed seizure for approximately 1.5 minutes per Kimi, she further endorses that she heard a scream from another room and then heard the crash. Then found the Pt bleeding from his mouth and nose, she noted 2 large hematomas one at the base of the skull and on the neck with notable streaking. Also, noted that the Pt had a postictal combative phase lasting longer than 10 minutes, where the Pt was unaware of time and situation. Reports that the Pt has Hx of diabetes, and hypothyroidism, denies any alcohol withdraw seizures in the past.

## 2024-08-14 PROBLEM — K70.31 ALCOHOLIC CIRRHOSIS OF LIVER WITH ASCITES: Status: ACTIVE | Noted: 2024-08-14

## 2024-08-14 PROBLEM — E11.9 TYPE 2 DIABETES MELLITUS: Status: ACTIVE | Noted: 2024-08-14

## 2024-08-14 PROBLEM — I10 ESSENTIAL HYPERTENSION: Status: ACTIVE | Noted: 2024-08-14

## 2024-08-14 PROBLEM — E03.9 HYPOTHYROIDISM (ACQUIRED): Status: ACTIVE | Noted: 2024-08-14

## 2024-08-14 LAB
ALBUMIN SERPL-MCNC: 3.4 G/DL (ref 3.5–5.2)
ALBUMIN/GLOB SERPL: 1.1 G/DL
ALP SERPL-CCNC: 81 U/L (ref 39–117)
ALT SERPL W P-5'-P-CCNC: 44 U/L (ref 1–41)
ANION GAP SERPL CALCULATED.3IONS-SCNC: 13.3 MMOL/L (ref 5–15)
AST SERPL-CCNC: 54 U/L (ref 1–40)
BILIRUB SERPL-MCNC: 1.1 MG/DL (ref 0–1.2)
BUN SERPL-MCNC: 9 MG/DL (ref 6–20)
BUN/CREAT SERPL: 13.6 (ref 7–25)
CALCIUM SPEC-SCNC: 8.4 MG/DL (ref 8.6–10.5)
CHLORIDE SERPL-SCNC: 102 MMOL/L (ref 98–107)
CO2 SERPL-SCNC: 21.7 MMOL/L (ref 22–29)
CREAT SERPL-MCNC: 0.66 MG/DL (ref 0.76–1.27)
DEPRECATED RDW RBC AUTO: 44.6 FL (ref 37–54)
EGFRCR SERPLBLD CKD-EPI 2021: 121.6 ML/MIN/1.73
ERYTHROCYTE [DISTWIDTH] IN BLOOD BY AUTOMATED COUNT: 12.5 % (ref 12.3–15.4)
GLOBULIN UR ELPH-MCNC: 3 GM/DL
GLUCOSE BLDC GLUCOMTR-MCNC: 110 MG/DL (ref 70–130)
GLUCOSE BLDC GLUCOMTR-MCNC: 119 MG/DL (ref 70–130)
GLUCOSE BLDC GLUCOMTR-MCNC: 119 MG/DL (ref 70–130)
GLUCOSE BLDC GLUCOMTR-MCNC: 128 MG/DL (ref 70–130)
GLUCOSE BLDC GLUCOMTR-MCNC: 89 MG/DL (ref 70–130)
GLUCOSE BLDC GLUCOMTR-MCNC: 94 MG/DL (ref 70–130)
GLUCOSE SERPL-MCNC: 115 MG/DL (ref 65–99)
HCT VFR BLD AUTO: 42.4 % (ref 37.5–51)
HGB BLD-MCNC: 14.7 G/DL (ref 13–17.7)
MAGNESIUM SERPL-MCNC: 2 MG/DL (ref 1.6–2.6)
MCH RBC QN AUTO: 33.4 PG (ref 26.6–33)
MCHC RBC AUTO-ENTMCNC: 34.7 G/DL (ref 31.5–35.7)
MCV RBC AUTO: 96.4 FL (ref 79–97)
PHOSPHATE SERPL-MCNC: 2.3 MG/DL (ref 2.5–4.5)
PLATELET # BLD AUTO: 60 10*3/MM3 (ref 140–450)
PMV BLD AUTO: 11.3 FL (ref 6–12)
POTASSIUM SERPL-SCNC: 3.9 MMOL/L (ref 3.5–5.2)
PROT SERPL-MCNC: 6.4 G/DL (ref 6–8.5)
RBC # BLD AUTO: 4.4 10*6/MM3 (ref 4.14–5.8)
SODIUM SERPL-SCNC: 137 MMOL/L (ref 136–145)
WBC NRBC COR # BLD AUTO: 5.17 10*3/MM3 (ref 3.4–10.8)

## 2024-08-14 PROCEDURE — 25010000002 LEVETRIRACETAM PER 10 MG: Performed by: PSYCHIATRY & NEUROLOGY

## 2024-08-14 PROCEDURE — 82948 REAGENT STRIP/BLOOD GLUCOSE: CPT

## 2024-08-14 PROCEDURE — 83735 ASSAY OF MAGNESIUM: CPT | Performed by: STUDENT IN AN ORGANIZED HEALTH CARE EDUCATION/TRAINING PROGRAM

## 2024-08-14 PROCEDURE — 25010000002 ENOXAPARIN PER 10 MG: Performed by: STUDENT IN AN ORGANIZED HEALTH CARE EDUCATION/TRAINING PROGRAM

## 2024-08-14 PROCEDURE — 80053 COMPREHEN METABOLIC PANEL: CPT | Performed by: STUDENT IN AN ORGANIZED HEALTH CARE EDUCATION/TRAINING PROGRAM

## 2024-08-14 PROCEDURE — 84100 ASSAY OF PHOSPHORUS: CPT | Performed by: STUDENT IN AN ORGANIZED HEALTH CARE EDUCATION/TRAINING PROGRAM

## 2024-08-14 PROCEDURE — 25010000002 LORAZEPAM PER 2 MG: Performed by: STUDENT IN AN ORGANIZED HEALTH CARE EDUCATION/TRAINING PROGRAM

## 2024-08-14 PROCEDURE — 85027 COMPLETE CBC AUTOMATED: CPT

## 2024-08-14 PROCEDURE — 25010000002 THIAMINE HCL 200 MG/2ML SOLUTION 2 ML VIAL: Performed by: STUDENT IN AN ORGANIZED HEALTH CARE EDUCATION/TRAINING PROGRAM

## 2024-08-14 PROCEDURE — 25010000002 PHENOBARBITAL PER 120 MG: Performed by: STUDENT IN AN ORGANIZED HEALTH CARE EDUCATION/TRAINING PROGRAM

## 2024-08-14 RX ORDER — ATORVASTATIN CALCIUM 20 MG/1
20 TABLET, FILM COATED ORAL DAILY
COMMUNITY

## 2024-08-14 RX ORDER — ERGOCALCIFEROL 1.25 MG/1
50000 CAPSULE, LIQUID FILLED ORAL
COMMUNITY

## 2024-08-14 RX ADMIN — LORAZEPAM 2 MG: 2 INJECTION INTRAMUSCULAR; INTRAVENOUS at 23:52

## 2024-08-14 RX ADMIN — LORAZEPAM 2 MG: 2 INJECTION INTRAMUSCULAR; INTRAVENOUS at 03:02

## 2024-08-14 RX ADMIN — LORAZEPAM 2 MG: 2 INJECTION INTRAMUSCULAR; INTRAVENOUS at 05:50

## 2024-08-14 RX ADMIN — Medication 1 TABLET: at 08:37

## 2024-08-14 RX ADMIN — PREGABALIN 300 MG: 100 CAPSULE ORAL at 08:37

## 2024-08-14 RX ADMIN — LORAZEPAM 2 MG: 2 INJECTION INTRAMUSCULAR; INTRAVENOUS at 21:51

## 2024-08-14 RX ADMIN — LORAZEPAM 2 MG: 2 INJECTION INTRAMUSCULAR; INTRAVENOUS at 15:59

## 2024-08-14 RX ADMIN — THIAMINE HYDROCHLORIDE 500 MG: 100 INJECTION, SOLUTION INTRAMUSCULAR; INTRAVENOUS at 21:33

## 2024-08-14 RX ADMIN — THIAMINE HYDROCHLORIDE 500 MG: 100 INJECTION, SOLUTION INTRAMUSCULAR; INTRAVENOUS at 14:49

## 2024-08-14 RX ADMIN — LEVETIRACETAM 500 MG: 100 INJECTION INTRAVENOUS at 08:37

## 2024-08-14 RX ADMIN — LORAZEPAM 2 MG: 2 INJECTION INTRAMUSCULAR; INTRAVENOUS at 22:08

## 2024-08-14 RX ADMIN — LORAZEPAM 2 MG: 2 INJECTION INTRAMUSCULAR; INTRAVENOUS at 13:09

## 2024-08-14 RX ADMIN — LEVOTHYROXINE SODIUM 112 MCG: 112 TABLET ORAL at 08:37

## 2024-08-14 RX ADMIN — LORAZEPAM 2 MG: 2 INJECTION INTRAMUSCULAR; INTRAVENOUS at 14:49

## 2024-08-14 RX ADMIN — LORAZEPAM 2 MG: 2 INJECTION INTRAMUSCULAR; INTRAVENOUS at 20:33

## 2024-08-14 RX ADMIN — ENOXAPARIN SODIUM 40 MG: 100 INJECTION SUBCUTANEOUS at 08:37

## 2024-08-14 RX ADMIN — Medication 10 ML: at 20:34

## 2024-08-14 RX ADMIN — LORAZEPAM 2 MG: 2 INJECTION INTRAMUSCULAR; INTRAVENOUS at 22:27

## 2024-08-14 RX ADMIN — LEVETIRACETAM 500 MG: 100 INJECTION INTRAVENOUS at 20:33

## 2024-08-14 RX ADMIN — LORAZEPAM 2 MG: 2 INJECTION INTRAMUSCULAR; INTRAVENOUS at 23:12

## 2024-08-14 RX ADMIN — LORAZEPAM 2 MG: 2 INJECTION INTRAMUSCULAR; INTRAVENOUS at 23:34

## 2024-08-14 RX ADMIN — LORAZEPAM 2 MG: 2 INJECTION INTRAMUSCULAR; INTRAVENOUS at 21:31

## 2024-08-14 RX ADMIN — PHENOBARBITAL SODIUM 65 MG: 65 INJECTION INTRAMUSCULAR; INTRAVENOUS at 17:23

## 2024-08-14 RX ADMIN — NICOTINE 1 PATCH: 21 PATCH, EXTENDED RELEASE TRANSDERMAL at 13:16

## 2024-08-14 RX ADMIN — THIAMINE HYDROCHLORIDE 500 MG: 100 INJECTION, SOLUTION INTRAMUSCULAR; INTRAVENOUS at 05:07

## 2024-08-14 RX ADMIN — FOLIC ACID 1 MG: 1 TABLET ORAL at 08:37

## 2024-08-14 RX ADMIN — LORAZEPAM 2 MG: 2 INJECTION INTRAMUSCULAR; INTRAVENOUS at 21:09

## 2024-08-14 RX ADMIN — PHENOBARBITAL SODIUM 65 MG: 65 INJECTION, SOLUTION INTRAMUSCULAR; INTRAVENOUS at 05:06

## 2024-08-14 RX ADMIN — LORAZEPAM 2 MG: 2 INJECTION INTRAMUSCULAR; INTRAVENOUS at 08:44

## 2024-08-14 NOTE — NURSING NOTE
Access center follow-up d/t ETOH.    Patient RIB with sitter at bedside. The patient is currently in bilateral wrist restraints. The patient was awake and alert but only mumbled incoherently when attempting verbal communication. Last CIWA 11. Access Center following and will discuss benzo use and JOE treatment options as patient improves.

## 2024-08-14 NOTE — PLAN OF CARE
Goal Outcome Evaluation:  Plan of Care Reviewed With: patient           Outcome Evaluation: Patient failed initial 3 oz water test, but passed subsequent swallow screenings. Discussed with RN. Will sign off. Please re consult as indicated.

## 2024-08-14 NOTE — PLAN OF CARE
Goal Outcome Evaluation:            Patient still confused.  Patient received PRN ativan and scheduled phenobarbitol.  Patient had to stand at bedside, heavy assist x 2, in order to urinate.  Patient did become combative on one occasion this shift

## 2024-08-14 NOTE — CASE MANAGEMENT/SOCIAL WORK
Discharge Planning Assessment  [facility]     Patient Name: Anthony Lerma  MRN: [id number]  Today's Date: 8/14/2024    Admit Date: 8/13/2024    Plan: Home vs return to the [facility]   Discharge Needs Assessment       Row Name 08/14/24 1424       Living Environment    People in Home child(joshua), dependent;spouse    Current Living Arrangements home    Potentially Unsafe Housing Conditions none    Primary Care Provided by self    Provides Primary Care For no one    Caregiving Concerns Spouse cares for children    Family Caregiver if Needed spouse    Family Caregiver Names Adrianna [phone]    Quality of Family Relationships helpful    Able to Return to Prior Arrangements yes       Transition Planning    Patient/Family Anticipated [facility]    Transportation Anticipated family or friend will provide       Discharge Needs Assessment    Readmission Within the Last 30 Days no previous admission in last 30 days    Concerns to be Addressed discharge planning                   Discharge Plan       Row Name 08/14/24 1426       Plan    Plan Home vs return to the [facility]    Plan Comments Spoke with patient's spouse-Adrianna [phone] via phone, introduced self and explained CCP role. Verified facesheet and PCP- Maral Shankar. Patient lives at home with spouse and two dependent children. Spouse is caring for children while Pt is in hospital. Patient was ind with ADLS prior to admission. Patient was admitted from the [facility] (there less than 24hrs) and spouse would like patient to return to [facility] [place] for IP treatment (Access following). Patient has no h/o HH/SNF and has no DME. Patient uses [facility] on [address] and reports no difficulty affording medications at this time. Family will transport at [facility]. CCP to follow. Imtiaz AMEZCUA RN                  Continued Care and Services - Admitted Since 8/13/2024    No active coordination exists for this encounter.          Demographic Summary        Row Name 08/14/24 1423       General Information    Admission Type inpatient    Referral Source admission list    Reason for Consult discharge planning    Preferred Language English                   Functional Status       Row Name 08/14/24 1424       Functional Status    Usual Activity Tolerance moderate    Current Activity Tolerance moderate       Functional Status, IADL    Medications independent    Meal Preparation independent    Housekeeping independent    Laundry independent    Shopping independent       Mental Status    General Appearance WDL WDL                   Psychosocial    No documentation.                  Abuse/Neglect    No documentation.                  Legal    No documentation.                  Substance Abuse    No documentation.                  Patient Forms    No documentation.                     Imtiaz Neff RN

## 2024-08-14 NOTE — PROGRESS NOTES
Name: Anthony Lerma ADMIT: 2024   : 1985  PCP: Provider, No Known    MRN: [id number] LOS: 1 days   AGE/SEX: 39 y.o. male  ROOM: [facility]     Subjective   Subjective   Patient had worsening withdrawal, transferred to ICU. His wife is at bedside.    Objective   Objective   Vital Signs  Temp:  [97.8 °F (36.6 °C)-98.3 °F (36.8 °C)] 98.3 °F (36.8 °C)  Heart Rate:  [] 96  Resp:  [18-19] 19  BP: (117-152)/() 132/94  SpO2:  [89 %-98 %] 94 %  on  Flow (L/min):  [2] 2;   Device (Oxygen Therapy): nasal cannula  Body mass index is 30.62 kg/m².  Physical Exam  Vitals and nursing note reviewed.   Constitutional:       General: He is not in acute distress.     Appearance: He is ill-appearing. He is not toxic-appearing or diaphoretic.   HENT:      Head: Normocephalic and atraumatic.      Nose: Nose normal.      Mouth/Throat:      Mouth: Mucous membranes are moist.      Pharynx: Oropharynx is clear.   Eyes:      Conjunctiva/sclera: Conjunctivae normal.      Pupils: Pupils are equal, round, and reactive to light.   Cardiovascular:      Rate and Rhythm: Normal rate and regular rhythm.      Pulses: Normal pulses.   Pulmonary:      Effort: Pulmonary effort is normal.      Breath sounds: Normal breath sounds.   Abdominal:      General: Bowel sounds are normal. There is no distension.      Palpations: Abdomen is soft.      Tenderness: There is no abdominal tenderness.   Musculoskeletal:         General: No swelling or tenderness.      Cervical back: Neck supple.   Skin:     General: Skin is warm and dry.      Capillary Refill: Capillary refill takes less than 2 seconds.   Neurological:      Mental Status: He is lethargic.       Results Review     I reviewed the patient's new clinical results.  Results from last 7 days   Lab Units 24  03124  0307   WBC 10*3/mm3 5.17 6.11   HEMOGLOBIN g/dL 14.7 15.1   PLATELETS 10*3/mm3 60* 57*     Results from last 7 days   Lab Units 24  0311 24  1344  08/13/24  0307   SODIUM mmol/L 137 132* 134*   POTASSIUM mmol/L 3.9 4.4 3.4*   CHLORIDE mmol/L 102 99 94*   CO2 mmol/L 21.7* 23.4 22.0   BUN mg/dL 9 10 11   CREATININE mg/dL 0.66* 0.80 0.82   GLUCOSE mg/dL 115* 160* 215*   EGFR mL/min/1.73 121.6 114.7 113.9     Results from last 7 days   Lab Units 08/14/24  0311 08/13/24  0307   ALBUMIN g/dL 3.4* 3.7   BILIRUBIN mg/dL 1.1 1.2   ALK PHOS U/L 81 98   AST (SGOT) U/L 54* 84*   ALT (SGPT) U/L 44* 63*     Results from last 7 days   Lab Units 08/14/24  0311 08/13/24  1344 08/13/24  0307   CALCIUM mg/dL 8.4* 8.7 9.3   ALBUMIN g/dL 3.4*  --  3.7   MAGNESIUM mg/dL 2.0  --  1.2*   PHOSPHORUS mg/dL 2.3*  --   --        Glucose   Date/Time Value Ref Range Status   08/14/2024 1648 94 70 - 130 mg/dL Final   08/14/2024 1246 110 70 - 130 mg/dL Final   08/14/2024 0732 119 70 - 130 mg/dL Final   08/14/2024 0458 119 70 - 130 mg/dL Final   08/14/2024 0031 128 70 - 130 mg/dL Final   08/13/2024 2155 121 70 - 130 mg/dL Final   08/13/2024 1959 136 (H) 70 - 130 mg/dL Final       CT Cervical Spine Without Contrast    Result Date: 8/13/2024  Electronically signed by Álvaro Rutledge MD on 08-13-24 at 0515    CT Head Without Contrast    Result Date: 8/13/2024  Electronically signed by Álvaro Rutledge MD on 08-13-24 at 0514     I have personally reviewed all medications:  Scheduled Medications  [Held by provider] amitriptyline, 10 mg, Oral, Nightly  enoxaparin, 40 mg, Subcutaneous, Daily  folic acid, 1 mg, Oral, Daily  [Held by provider] furosemide, 20 mg, Oral, Daily  insulin glargine, 10 Units, Subcutaneous, Nightly  insulin regular, 2-9 Units, Subcutaneous, Q6H  levETIRAcetam, 500 mg, Intravenous, Q12H  [START ON 8/15/2024] levETIRAcetam, 250 mg, Oral, BID  levothyroxine, 112 mcg, Oral, Daily  multivitamin with minerals, 1 tablet, Oral, Daily  nicotine, 1 patch, Transdermal, Q24H  [START ON 8/15/2024] PHENobarbital, 32.4 mg, Oral, Once   Followed by  [START ON 8/15/2024] PHENobarbital, 32.4  mg, Oral, Once   Followed by  [START ON 8/16/2024] PHENobarbital, 32.4 mg, Oral, Once  pregabalin, 300 mg, Oral, BID  sodium chloride, 10 mL, Intravenous, Q12H  [Held by provider] spironolactone, 50 mg, Oral, Daily  thiamine (B-1) IV, 500 mg, Intravenous, Q8H   Followed by  [START ON 8/16/2024] thiamine (B-1) IV, 200 mg, Intravenous, Q8H   Followed by  [START ON 8/20/2024] thiamine, 100 mg, Oral, Daily    Infusions   Diet  Diet: Liquid; Full Liquid; Fluid Consistency: Thin (IDDSI 0)    I have personally reviewed:  [x]  Laboratory   []  Microbiology   [x]  Radiology   [x]  EKG/Telemetry  []  Cardiology/Vascular   []  Pathology    [x]  Records    Assessment/Plan     Active Hospital Problems    Diagnosis  POA    **Alcohol withdrawal seizure [F10.939, R56.9]  Yes    Type 2 diabetes mellitus [E11.9]  Yes    Essential hypertension [I10]  Yes    Alcoholic cirrhosis of liver with ascites [K70.31]  Yes    Hypothyroidism (acquired) [E03.9]  Yes    ETOH abuse [F10.10]  Yes      Resolved Hospital Problems   No resolved problems to display.   Alcohol Dependence in Withdrawal with Seizure  - continue CIWA protocol with ativan and phenobarbital  - continue vitamin replacement   - follow electrolytes and replace as needed  - short term course of keppra ordered  - appreciate neurology recs and critical care management from [facility]    Type 2 DM  - blood glucose acceptable  - continue lantus 10 units nightly  - cover with ssi/hypoglycemia protocol    HTN  - BP acceptable  - continue current regimen    Alcoholic Cirrhosis  - appears reasonably well compensated, has secondary thrombocytopenia  - hold lasix and aldactone for now    Hypothyroidism  - continue levothyroxine    Lovenox 40 mg SC daily for DVT prophylaxis.  Full code.  Discussed with patient and nursing staff.  Anticipate discharge home timing yet to be determined.  Expected discharge date/ time has not been documented.      Álvaro Resendez MD  [facility]  08/14/24  18:59 EDT

## 2024-08-14 NOTE — PLAN OF CARE
Pt A&ox3, no episodes of seizures overnight.  Pt VSS. Restraint and sitter present. Pt on seizure precautions and CIWA protocol in place.

## 2024-08-14 NOTE — PAYOR COMM NOTE
"Anthony Lerma (39 y.o. Male)                         ATTENTION; INITIAL CLINICALS AUTH PENDING [phone]                        REPLY TO UR DEPT .[phone] OR CALL   PEPE BOSS LPBRADEN             Date of Birth   1985    Social Security Number       Address   [address] [place] [address]    Home Phone   [phone]    MRN   [id number]       Confucianism   Unknown    Marital Status                               Admission Date   8/13/24    Admission Type   Emergency    Admitting Provider   Hayden Sol MD    Attending Provider   Álvaro Resendez MD    Department, Room/Bed   [facility] INTENSIVE CARE, I371/1       Discharge Date       Discharge Disposition       Discharge Destination                                 Attending Provider: Álvaro Resendez MD    Allergies: No Known Allergies    Isolation: None   Infection: None   Code Status: CPR    Ht: 177.8 cm (70\")   Wt: 96.8 kg (213 lb 6.5 oz)    Admission Cmt: PT CAME FROM THE [place]   Principal Problem: Alcohol withdrawal seizure [F10.939,R56.9]                   Active Insurance as of 8/13/2024       Primary Coverage       Payor Plan Insurance Group Employer/Plan Group    AET[employer] COMMERCIAL AETNA [id number]       Payor Plan Address Payor Plan Phone Number Payor Plan Fax Number Effective Dates    [address] [phone]  10/16/2022 - None Entered    [facility] [id number]         Subscriber Name Subscriber Birth Date Member ID       ANTHONY LERMA 1985 [id number]                     Emergency Contacts        (Rel.) Home Phone Work Phone Mobile Phone    BISHOP LERMA (Spouse) -- -- [phone]    Paige Lerma (Mother) -- -- [phone]                 History & Physical        Eric Lehman MD at 08/13/24 1213              Patient Name:  Anthony Lerma  YOB: 1985  MRN:  [id number]  Admit Date:  8/13/2024  Patient Care Team:  Provider, No Known as PCP - " General      Subjective  History Present Illness     Chief Complaint   Patient presents with    Seizures     This is a 39-year-old male with a past medical history of cirrhosis, type 2 diabetes, hypothyroidism presents the hospital after experiencing a seizure in the middle of the night.  He has been drinking quite often, but never really gave me a straight answer about how much he has been drinking.  He told his wife that he needed assistance with alcohol cessation and he was taken to the [place] yesterday to detox from alcohol.  That is when he had a seizure and he was brought to the ER for further management.  His wife reports that his last drink was approximately 36 hours ago.    Despite ativan and phenobarbital he remains very agitated.       Personal History     History reviewed. No pertinent past medical history.  History reviewed. No pertinent surgical history.  History reviewed. No pertinent family history.     No current facility-administered medications on file prior to encounter.     Current Outpatient Medications on File Prior to Encounter   Medication Sig Dispense Refill    amitriptyline (ELAVIL) 10 MG tablet Take 1 tablet by mouth Every Night.      clindamycin (CLEOCIN) 300 MG capsule       Continuous Glucose Sensor (Dexcom G7 Sensor) misc APPLY ONE SENSOR TOPICALLY EVERY 10 DAYS      furosemide (LASIX) 20 MG tablet Take 1 tablet by mouth Daily.      insulin aspart (NovoLOG FlexPen) 100 UNIT/ML solution pen-injector sc pen 5 UNITS BEFORE SUPPER (IF B/G GREATER THAN 200 USE 5 UNITS FOR EACH 50MG ABOVE 200, MAX 50 UNITS DAILY.      Januvia 50 MG tablet Take 1 tablet by mouth Daily.      Lantus SoloStar 100 UNIT/ML injection pen INJECT 8 TO 12 UNITS SUBCUTANEOUSLY ONCE DAILY      levothyroxine (SYNTHROID, LEVOTHROID) 112 MCG tablet Take 1 tablet by mouth Daily.      nortriptyline (PAMELOR) 10 MG capsule Take 1 capsule by mouth Daily.      pregabalin (LYRICA) 100 MG capsule Take 3 capsules by mouth 2 (Two)  Times a Day.      pregabalin (Lyrica) 225 MG capsule 1 capsule Daily.      spironolactone (ALDACTONE) 50 MG tablet Take 1 tablet by mouth Daily.      Cymbalta 20 MG capsule 1 capsule Every 12 (Twelve) Hours.      furosemide (Lasix) 40 MG tablet Take 1 tablet by mouth Daily.      levETIRAcetam (KEPPRA) 500 MG tablet Take 1 tablet by mouth Every 12 (Twelve) Hours.      meloxicam (MOBIC) 15 MG tablet Take 1 tablet by mouth Daily.      spironolactone (ALDACTONE) 100 MG tablet Take 1 tablet by mouth Daily.      vitamin B-12 (CYANOCOBALAMIN) 1000 MCG tablet Take 1 tablet by mouth Daily.       No Known Allergies    Objective   Objective     Vital Signs  Temp:  [98.7 °F (37.1 °C)] 98.7 °F (37.1 °C)  Heart Rate:  [] 81  Resp:  [18-19] 18  BP: (137-161)/() 161/101  SpO2:  [91 %-96 %] 96 %  on  Flow (L/min):  [3] 3;   Device (Oxygen Therapy): room air  Body mass index is 30.56 kg/m².    Physical Exam  Constitutional:       General: He is in acute distress.      Appearance: He is ill-appearing.   HENT:      Head: Normocephalic and atraumatic.   Eyes:      Extraocular Movements: Extraocular movements intact.      Pupils: Pupils are equal, round, and reactive to light.   Cardiovascular:      Rate and Rhythm: Normal rate and regular rhythm.   Pulmonary:      Effort: Pulmonary effort is normal. No respiratory distress.   Abdominal:      General: There is no distension.      Palpations: Abdomen is soft.      Tenderness: There is no abdominal tenderness.   Skin:     Comments: Plethoric face   Neurological:      Mental Status: He is alert and oriented to person, place, and time.      Cranial Nerves: No cranial nerve deficit.         Results Review:  I reviewed the patient's new clinical results.  I reviewed the patient's new imaging results and agree with the interpretation.  I reviewed the patient's other test results and agree with the interpretation  I personally viewed and interpreted the patient's EKG/Telemetry  data  Discussed with ED provider.    Lab Results (last 24 hours)       Procedure Component Value Units Date/Time    CBC & Differential [[id number]]  (Abnormal) Collected: 08/13/24 0307    Specimen: Blood Updated: 08/13/24 0326    Narrative:      The following orders were created for panel order CBC & Differential.  Procedure                               Abnormality         Status                     ---------                               -----------         ------                     CBC Auto Differential[[id number]]        Abnormal            Final result                 Please view results for these tests on the individual orders.    Comprehensive Metabolic Panel [[id number]]  (Abnormal) Collected: 08/13/24 0307    Specimen: Blood Updated: 08/13/24 0337     Glucose 215 mg/dL      BUN 11 mg/dL      Creatinine 0.82 mg/dL      Sodium 134 mmol/L      Potassium 3.4 mmol/L      Chloride 94 mmol/L      CO2 22.0 mmol/L      Calcium 9.3 mg/dL      Total Protein 6.9 g/dL      Albumin 3.7 g/dL      ALT (SGPT) 63 U/L      AST (SGOT) 84 U/L      Alkaline Phosphatase 98 U/L      Total Bilirubin 1.2 mg/dL      Globulin 3.2 gm/dL      A/G Ratio 1.2 g/dL      BUN/Creatinine Ratio 13.4     Anion Gap 18.0 mmol/L      eGFR 113.9 mL/min/1.73     Narrative:      GFR Normal >60  Chronic Kidney Disease <60  Kidney Failure <15      Ethanol [[id number]] Collected: 08/13/24 0307    Specimen: Blood Updated: 08/13/24 0337     Ethanol <10 mg/dL      Ethanol % <0.010 %     CK [[id number]]  (Abnormal) Collected: 08/13/24 0307    Specimen: Blood Updated: 08/13/24 0337     Creatine Kinase 232 U/L     CBC Auto Differential [[id number]]  (Abnormal) Collected: 08/13/24 0307    Specimen: Blood Updated: 08/13/24 0326     WBC 6.11 10*3/mm3      RBC 4.60 10*6/mm3      Hemoglobin 15.1 g/dL      Hematocrit 44.5 %      MCV 96.7 fL      MCH 32.8 pg      MCHC 33.9 g/dL      RDW 12.4 %      RDW-SD 44.2 fl      MPV 10.9 fL      Platelets 57 10*3/mm3      Neutrophil  % 73.2 %      Lymphocyte % 12.9 %      Monocyte % 12.3 %      Eosinophil % 0.8 %      Basophil % 0.3 %      Immature Grans % 0.5 %      Neutrophils, Absolute 4.47 10*3/mm3      Lymphocytes, Absolute 0.79 10*3/mm3      Monocytes, Absolute 0.75 10*3/mm3      Eosinophils, Absolute 0.05 10*3/mm3      Basophils, Absolute 0.02 10*3/mm3      Immature Grans, Absolute 0.03 10*3/mm3     Magnesium [[id number]]  (Abnormal) Collected: 08/13/24 0307    Specimen: Blood Updated: 08/13/24 0358     Magnesium 1.2 mg/dL     Urine Drug Screen - Urine, Clean Catch [[id number]]  (Normal) Collected: 08/13/24 0409    Specimen: Urine, Clean Catch Updated: 08/13/24 0445     Amphet/Methamphet, Screen Negative     Barbiturates Screen, Urine Negative     Benzodiazepine Screen, Urine Negative     Cocaine Screen, Urine Negative     Opiate Screen Negative     THC, Screen, Urine Negative     Methadone Screen, Urine Negative     Oxycodone Screen, Urine Negative     Fentanyl, Urine Negative    Narrative:      Negative Thresholds Per Drugs Screened:    Amphetamines                 500 ng/ml  Barbiturates                 200 ng/ml  Benzodiazepines              100 ng/ml  Cocaine                      300 ng/ml  Methadone                    300 ng/ml  Opiates                      300 ng/ml  Oxycodone                    100 ng/ml  THC                           50 ng/ml  Fentanyl                       5 ng/ml      The Normal Value for all drugs tested is negative. This report includes final unconfirmed screening results to be used for medical treatment purposes only. Unconfirmed results must not be used for non-medical purposes such as employment or legal testing. Clinical consideration should be applied to any drug of abuse test, particularly when unconfirmed results are used.            POC Glucose Once [[id number]]  (Abnormal) Collected: 08/13/24 0807    Specimen: Blood Updated: 08/13/24 0808     Glucose 160 mg/dL             No results found for this or  any previous visit.    Imaging Results (Last 24 Hours)       Procedure Component Value Units Date/Time    CT Cervical Spine Without Contrast [[id number]] Collected: 08/13/24 0516     Updated: 08/13/24 0516    Narrative:        Patient: MACKENZIE SANCHEZ  Time Out: 05:15  Exam(s): CT C SPINE     EXAM:    CT Cervical Spine Without Intravenous Contrast    CLINICAL HISTORY:     Reason for exam: seizure, head trauma.    TECHNIQUE:    Axial computed tomography images of the cervical spine without   intravenous contrast.  CTDI is 19.54 mGy and DLP is 452.8 mGy-cm.  This   CT exam was performed according to the principle of ALARA (As Low As   Reasonably Achievable) by using one or more of the following dose   reduction techniques: automated exposure control, adjustment of the mA   and or kV according to patient size, and or use of iterative   reconstruction technique.    COMPARISON:    No relevant prior studies available.    FINDINGS:    Artifacts:  Motion artifact limits evaluation.  Despite this, no   evidence of grossly displaced fracture or dislocation within the spine.    Consider repeat imaging if there is further concern.    Vertebrae:  Loss of cervical lordosis which can be seen with patient   positioning or muscle spasm.    Soft tissues:  Unremarkable.    IMPRESSION:       1.  Motion artifact limits evaluation.  Despite this, no evidence of   grossly displaced fracture or dislocation within the spine.  Consider   repeat imaging if there is further concern.  2.  Loss of cervical lordosis which can be seen with patient positioning   or muscle spasm.      Impression:          Electronically signed by Álvaro Rutledge MD on 08-13-24 at 0515    CT Head Without Contrast [[id number]] Collected: 08/13/24 0515     Updated: 08/13/24 0515    Narrative:        Patient: MACKENZIE SANCHEZ  Time Out: 05:14  Exam(s): CT HEAD Without Contrast     EXAM:    CT Head Without Intravenous Contrast    CLINICAL HISTORY:     Reason for exam:  seizure, head trauma.    TECHNIQUE:    Axial computed tomography images of the head brain without intravenous   contrast.  CTDI is 55.96 mGy and DLP is 973.2 mGy-cm.  This CT exam was   performed according to the principle of ALARA (As Low As Reasonably   Achievable) by using one or more of the following dose reduction   techniques: automated exposure control, adjustment of the mA and or kV   according to patient size, and or use of iterative reconstruction   technique.    COMPARISON:    No relevant prior studies available.    FINDINGS:    Brain:  No acute intracranial abnormality.  Consider MRI if there is   further concern.  Areas of decreased attenuation in the deep cerebral   white matter are consistent with small vessel ischemic degenerative   changes.  The cerebral and cerebellar sulci are prominent consistent with   brain atrophy.  No hemorrhage.    Ventricles:  Unremarkable.  No ventriculomegaly.    Bones joints:  Unremarkable.  No acute fracture.    Soft tissues:  Unremarkable.    Vasculature:  Atherosclerotic disease.    Sinuses:  Unremarkable as visualized.    Mastoid air cells:  Unremarkable as visualized.  No mastoid effusion.    IMPRESSION:       1.  No acute intracranial abnormality.  Consider MRI if there is further   concern.  2.  Small vessel ischemic degenerative changes.  3.  Cerebral and cerebellar atrophy.      Impression:          Electronically signed by Álvaro Rutledge MD on 08-13-24 at 0514                ECG 12 Lead Rhythm Change   Final Result   HEART RATE=89  bpm   RR Xeaheswx=721  ms   NV Lfnxejpn=618  ms   P Horizontal Axis=-19  deg   P Front Axis=40  deg   QRSD Interval=91  ms   QT Eczyvfie=910  ms   WAfO=842  ms   QRS Axis=33  deg   T Wave Axis=40  deg   - ABNORMAL ECG -   Sinus rhythm   Left atrial enlargement   Low voltage, precordial leads   Atrial fibrillation has resolved   Electronically Signed By: Myesha Kohli ([facility]) 2024-08-13 10:25:05   Date and Time of Study:2024-08-13  05:17:43      ECG 12 Lead QT Measurement   Final Result   HEART QIHJ=373  bpm   RR Wcrkroio=664  ms   VT Interval=  ms   P Horizontal Axis=  deg   P Front Axis=  deg   QRSD Interval=92  ms   QT Brrnpszf=929  ms   PUkV=197  ms   QRS Axis=42  deg   T Wave Axis=33  deg   - ABNORMAL ECG -   Atrial fibrillation   Abnormal R-wave progression, late transition   Borderline  prolonged QT interval   No change from previous tracing   Electronically Signed By: Myesha Kohli ([facility]) 2024-08-13 10:25:15   Date and Time of Study:2024-08-13 02:52:56                 Assessment/Plan     Active Hospital Problems    Diagnosis  POA    **Alcohol withdrawal seizure [F10.939, R56.9]  Yes    ETOH abuse [F10.10]  Yes      Resolved Hospital Problems   No resolved problems to display.     Alcohol withdrawal seizure  Alcohol abuse  Alcohol dependency  Starting on CIWA protocol.  Remains agitated despite frequent dosing of Ativan and phenobarbital.  Will put in for a critical care consult to evaluate for ICU transfer.    Cirrhosis secondary to alcohol  Transaminitis  thrombocytopenia  He takes Lasix and Aldactone 20/50.  Holding these 2 currently as he is not taking any fluids in.  Monitor renal function.  SCD for DVT ppx     Hypothyroidism  Resume home medications     I discussed the patient's findings and my recommendations with patient, family, and ED provider.    VTE Prophylaxis - SCDs.  Code Status - Full code.       Eric Lehman MD  [facility]  08/13/24  12:13 EDT      Electronically signed by Eric Lehman MD at 08/13/24 1221          Emergency Department Notes        Byran Sam, RN at 08/13/24 1337          Nursing report ED to floor  Anthony Lerma  39 y.o.  male    HPI :  HPI (Adult)  Stated Reason for Visit: To ER via EMS from [facility].  Sent for seizure.  Nurse was next door and heard pt fall out of bed.  Pt appears to have bit tongue.  At [place] for detox from ETOH.  Last drink 1 week ago.  Admitted on  8/12 to the [place].     C/o slight pain to head from fall.    Chief Complaint  Chief Complaint   Patient presents with    Seizures       Admitting doctor:   Hayden Sol MD    Admitting diagnosis:   The primary encounter diagnosis was Alcohol withdrawal seizure without complication. Diagnoses of Hypokalemia, Hypomagnesemia, and Atrial fibrillation with rapid ventricular response were also pertinent to this visit.    Code status:   Current Code Status       Date Active Code Status Order ID Comments User Context       8/13/2024 0733 CPR (Attempt to Resuscitate) [id number]  Nicole Leblanc APRN ED        Question Answer    Code Status (Patient has no pulse and is not breathing) CPR (Attempt to Resuscitate)    Medical Interventions (Patient has pulse or is breathing) Full                    Allergies:   Patient has no known allergies.    Isolation:   No active isolations    Intake and Output    Intake/Output Summary (Last 24 hours) at 8/13/2024 1337  Last data filed at 8/13/2024 1201  Gross per 24 hour   Intake 1400 ml   Output --   Net 1400 ml       Weight:       08/13/24  0226   Weight: 96.6 kg (213 lb)       Most recent vitals:   Vitals:    08/13/24 0947 08/13/24 1028 08/13/24 1147 08/13/24 1210   BP:  (!) 161/101 153/93 151/96   BP Location:       Patient Position:       Pulse: 75 81 100 82   Resp:       Temp:       SpO2: 94% 96% 95% 94%   Weight:       Height:           Active LDAs/IV Access:   Lines, Drains & Airways       Active LDAs       Name Placement date Placement time Site Days    Peripheral IV 08/13/24 1128 Right Antecubital 08/13/24  1128  Antecubital  less than 1    Peripheral IV 08/13/24 1240 Anterior;Left Forearm 08/13/24  1240  Forearm  less than 1                    Labs (abnormal labs have a star):   Labs Reviewed   COMPREHENSIVE METABOLIC PANEL - Abnormal; Notable for the following components:       Result Value    Glucose 215 (*)     Sodium 134 (*)     Potassium 3.4 (*)     Chloride 94 (*)      ALT (SGPT) 63 (*)     AST (SGOT) 84 (*)     Anion Gap 18.0 (*)     All other components within normal limits    Narrative:     GFR Normal >60  Chronic Kidney Disease <60  Kidney Failure <15     CK - Abnormal; Notable for the following components:    Creatine Kinase 232 (*)     All other components within normal limits   CBC WITH AUTO DIFFERENTIAL - Abnormal; Notable for the following components:    Platelets 57 (*)     Lymphocyte % 12.9 (*)     Monocyte % 12.3 (*)     All other components within normal limits   MAGNESIUM - Abnormal; Notable for the following components:    Magnesium 1.2 (*)     All other components within normal limits   POCT GLUCOSE FINGERSTICK - Abnormal; Notable for the following components:    Glucose 160 (*)     All other components within normal limits   POCT GLUCOSE FINGERSTICK - Abnormal; Notable for the following components:    Glucose 137 (*)     All other components within normal limits   URINE DRUG SCREEN - Normal    Narrative:     Negative Thresholds Per Drugs Screened:    Amphetamines                 500 ng/ml  Barbiturates                 200 ng/ml  Benzodiazepines              100 ng/ml  Cocaine                      300 ng/ml  Methadone                    300 ng/ml  Opiates                      300 ng/ml  Oxycodone                    100 ng/ml  THC                           50 ng/ml  Fentanyl                       5 ng/ml      The Normal Value for all drugs tested is negative. This report includes final unconfirmed screening results to be used for medical treatment purposes only. Unconfirmed results must not be used for non-medical purposes such as employment or legal testing. Clinical consideration should be applied to any drug of abuse test, particularly when unconfirmed results are used.           ETHANOL   POCT GLUCOSE FINGERSTICK   POCT GLUCOSE FINGERSTICK   POCT GLUCOSE FINGERSTICK   POCT GLUCOSE FINGERSTICK   CBC AND DIFFERENTIAL    Narrative:     The following orders were  created for panel order CBC & Differential.  Procedure                               Abnormality         Status                     ---------                               -----------         ------                     CBC Auto Differential[[id number]]        Abnormal            Final result                 Please view results for these tests on the individual orders.       EKG:   ECG 12 Lead Rhythm Change   Final Result   HEART RATE=89  bpm   RR Hsjlqwzc=035  ms   SC Tptvxras=775  ms   P Horizontal Axis=-19  deg   P Front Axis=40  deg   QRSD Interval=91  ms   QT Zxvhcibl=495  ms   AXcZ=567  ms   QRS Axis=33  deg   T Wave Axis=40  deg   - ABNORMAL ECG -   Sinus rhythm   Left atrial enlargement   Low voltage, precordial leads   Atrial fibrillation has resolved   Electronically Signed By: Myesha Kohli ([facility]) 2024-08-13 10:25:05   Date and Time of Study:2024-08-13 05:17:43      ECG 12 Lead QT Measurement   Final Result   HEART STXO=434  bpm   RR Bpzvegxh=697  ms   SC Interval=  ms   P Horizontal Axis=  deg   P Front Axis=  deg   QRSD Interval=92  ms   QT Oqojurhe=397  ms   OZrW=086  ms   QRS Axis=42  deg   T Wave Axis=33  deg   - ABNORMAL ECG -   Atrial fibrillation   Abnormal R-wave progression, late transition   Borderline  prolonged QT interval   No change from previous tracing   Electronically Signed By: Myesha Kohli ([facility]) 2024-08-13 10:25:15   Date and Time of Study:2024-08-13 02:52:56          Meds given in ED:   Medications   sodium chloride 0.9 % flush 10 mL (has no administration in time range)   Magnesium Standard Dose Replacement - Follow Nurse / BPA Driven Protocol (has no administration in time range)   thiamine (B-1) 500 mg in sodium chloride 0.9 % 100 mL IVPB (0 mg Intravenous Stopped 8/13/24 0809)     Followed by   thiamine (B-1) injection 200 mg (has no administration in time range)     Followed by   thiamine (VITAMIN B-1) tablet 100 mg (has no administration in time range)   folic acid (FOLVITE)  tablet 1 mg (has no administration in time range)   nitroglycerin (NITROSTAT) SL tablet 0.4 mg (has no administration in time range)   sennosides-docusate (PERICOLACE) 8.6-50 MG per tablet 2 tablet (has no administration in time range)     And   polyethylene glycol (MIRALAX) packet 17 g (has no administration in time range)     And   bisacodyl (DULCOLAX) EC tablet 5 mg (has no administration in time range)     And   bisacodyl (DULCOLAX) suppository 10 mg (has no administration in time range)   ondansetron ODT (ZOFRAN-ODT) disintegrating tablet 4 mg (has no administration in time range)     Or   ondansetron (ZOFRAN) injection 4 mg (has no administration in time range)   Potassium Replacement - Follow Nurse / BPA Driven Protocol (has no administration in time range)   acetaminophen (TYLENOL) tablet 650 mg (has no administration in time range)   cloNIDine (CATAPRES) tablet 0.1 mg (has no administration in time range)   multivitamin with minerals 1 tablet (has no administration in time range)   LORazepam (ATIVAN) tablet 1 mg ( Oral Not Given:  See Alt 8/13/24 1327)     Or   LORazepam (ATIVAN) injection 1 mg ( Intravenous Not Given:  See Alt 8/13/24 1327)     Or   LORazepam (ATIVAN) tablet 2 mg ( Oral Not Given:  See Alt 8/13/24 1327)     Or   LORazepam (ATIVAN) injection 2 mg ( Intravenous Not Given:  See Alt 8/13/24 1327)     Or   LORazepam (ATIVAN) injection 2 mg (2 mg Intravenous Given 8/13/24 1327)     Or   LORazepam (ATIVAN) injection 2 mg ( Intramuscular Not Given:  See Alt 8/13/24 1327)   sodium chloride 0.9 % flush 10 mL (has no administration in time range)   sodium chloride 0.9 % flush 10 mL (has no administration in time range)   sodium chloride 0.9 % infusion 40 mL (has no administration in time range)   Potassium Replacement - Follow Nurse / BPA Driven Protocol (has no administration in time range)   Magnesium Standard Dose Replacement - Follow Nurse / BPA Driven Protocol (has no administration in time  range)   Phosphorus Replacement - Follow Nurse / BPA Driven Protocol (has no administration in time range)   Calcium Replacement - Follow Nurse / BPA Driven Protocol (has no administration in time range)   Enoxaparin Sodium (LOVENOX) syringe 40 mg (has no administration in time range)   nitroglycerin (NITROSTAT) SL tablet 0.4 mg (has no administration in time range)   nicotine (NICODERM CQ) 21 MG/24HR patch 1 patch (has no administration in time range)   nicotine polacrilex (NICORETTE) gum 4 mg (has no administration in time range)   levothyroxine (SYNTHROID, LEVOTHROID) tablet 112 mcg (has no administration in time range)   spironolactone (ALDACTONE) tablet 50 mg ( Oral Dose Auto Held 8/21/24 0900)   furosemide (LASIX) tablet 20 mg ( Oral Dose Auto Held 8/21/24 0900)   pregabalin (LYRICA) capsule 300 mg (has no administration in time range)   insulin glargine (LANTUS, SEMGLEE) injection 10 Units (has no administration in time range)   amitriptyline (ELAVIL) tablet 10 mg ( Oral Dose Auto Held 8/21/24 2100)   PHENobarbital 291.9 mg in sodium chloride 0.9 % 100 mL IVPB (0 mg Intravenous Stopped 8/13/24 1201)     Followed by   PHENobarbital 219.1 mg in sodium chloride 0.9 % 100 mL IVPB (has no administration in time range)     Followed by   PHENobarbital 219.1 mg in sodium chloride 0.9 % 100 mL IVPB (has no administration in time range)   PHENobarbital injection 65 mg (has no administration in time range)     Followed by   PHENobarbital injection 65 mg (has no administration in time range)     Followed by   PHENobarbital tablet 32.4 mg (has no administration in time range)     Followed by   PHENobarbital tablet 32.4 mg (has no administration in time range)     Followed by   PHENobarbital tablet 32.4 mg (has no administration in time range)   dextrose (GLUTOSE) oral gel 15 g (has no administration in time range)   dextrose (D50W) (25 g/50 mL) IV injection 25 g (has no administration in time range)   glucagon (GLUCAGEN)  injection 1 mg (has no administration in time range)   insulin regular (humuLIN R,novoLIN R) injection 2-9 Units ( Subcutaneous Not Given 8/13/24 1254)   sodium chloride 0.9 % bolus 1,000 mL (0 mL Intravenous Stopped 8/13/24 0809)   LORazepam (ATIVAN) injection 1 mg (1 mg Intravenous Given 8/13/24 0311)   potassium chloride (K-DUR,KLOR-CON) ER tablet 40 mEq (40 mEq Oral Given 8/13/24 0353)   metoprolol tartrate (LOPRESSOR) injection 5 mg (5 mg Intravenous Given 8/13/24 0358)   metoprolol tartrate (LOPRESSOR) tablet 25 mg (25 mg Oral Given 8/13/24 0357)   potassium chloride (K-DUR,KLOR-CON) ER tablet 40 mEq (40 mEq Oral Given 8/13/24 0804)   magnesium sulfate in D5W 1g/100mL (PREMIX) (1 g Intravenous New Bag 8/13/24 1240)       Imaging results:  CT Cervical Spine Without Contrast    Result Date: 8/13/2024  Electronically signed by Álvaro Rutledge MD on 08-13-24 at 0515    CT Head Without Contrast    Result Date: 8/13/2024  Electronically signed by Álvaro Rutledge MD on 08-13-24 at 0514     Ambulatory status:   - bedrest    Social issues:   Social History     Socioeconomic History    Marital status:        Peripheral Neurovascular  Peripheral Neurovascular (Adult)  Peripheral Neurovascular WDL: WDL    Neuro Cognitive  Neuro Cognitive (Adult)  Cognitive/Neuro/Behavioral WDL: WDL, orientation  Orientation: oriented x 4  [place] Coma Scale  Best Eye Response: 4-->(E4) spontaneous  Best Motor Response: 6-->(M6) obeys commands  Best Verbal Response: 5-->(V5) oriented  Jm Coma Scale Score: 15  Seizure Assessment  Seizure Activity: witnessed  Seizure Presentation: stiffening, tonic rigidity  Seizure Areas Involved: generalized  Seizure Movement: tonic, clonic    Learning  Learning Assessment (Adult)  Learning Readiness and Ability: no barriers identified  Education Provided  Person Taught: patient  Teaching Method: verbal instruction  Teaching Focus: symptom/problem overview, diagnostic test  Education Outcome  Evaluation: verbalizes understanding    Respiratory  Respiratory WDL  Respiratory WDL: WDL, rhythm/pattern  Rhythm/Pattern, Respiratory: unlabored, pattern regular, depth regular, no shortness of breath reported    Abdominal Pain       Pain Assessments  Pain (Adult)  (0-10) Pain Rating: Rest: 3  Pain Location: head    NIH Stroke Scale       Bryan Sam RN  08/13/24 13:37 EDT    Electronically signed by Bryan Sam RN at 08/13/24 1337       Varsha Kyle RN at 08/13/24 1336          Pt unable to swallow liquid, pt needs one more dose of K . Pharm sol notified and reported a redraw to see where we are at if we need to do IV dose.     Electronically signed by Varsha Kyle RN at 08/13/24 1337       Varsha Kyle RN at 08/13/24 1130          This RN has talked to pt multiple time about leaving cardiac monitor in place, pt pulled out IV in L AC. Pt continues to try and get up and pull at lines. Pt is very unsteady on feet.  Bed alarm has been placed, TV has been turned on with no improvements. MD Lehman notified of pt condition. Medication ordered, restraints ordered, upgraded pts bed status.     Electronically signed by Varsha Kyle RN at 08/13/24 1419       Varsha Kyle RN at 08/13/24 1035          Access at bedside     Electronically signed by Varsha Kyle RN at 08/13/24 1035       Steve Ramírez MD at 08/13/24 0257           EMERGENCY DEPARTMENT ENCOUNTER  Room Number:  14/14  PCP: Provider, No Known  Independent Historians: Patient, liaison from the [facility]      HPI:  Chief Complaint: had concerns including Seizures.     A complete HPI/ROS/PMH/PSH/SH/FH are unobtainable due to: Nothing      Context: Anthony Lerma is a 39 y.o. male with a medical history of cirrhosis, diabetes, alcohol dependence who presents to the ED c/o acute seizure.  Patient was at the [facility] undergoing alcohol detoxification and rehabilitation.  He had an unwitnessed seizure.  Staff heard a scream and then heard  him fall.  He was noted to have a hematoma on the back of his neck and also the back of his head.  Patient denies prior history of seizures.  He states that he was not a daily drinker but that he was encouraged by family to get help for his problem with alcohol.  He cannot really recall when his last drink of alcohol was.    Patient also recently passed out while he was on a cruise around 4 July.  He states that he hit his head at that time.  He was not drinking alcohol at the time and he does not believe that he had a seizure then.    He denies chest pain or shortness of breath.  He does complain of dry mouth and mild headache and neck pain.      Review of prior external notes (non-ED) -and- Review of prior external test results outside of this encounter: I reviewed internal medicine visit from 7/30/2024.  Patient was seen in follow-up for diabetes, hypothyroidism, hypertension, hyperlipidemia.        PAST MEDICAL HISTORY  Active Ambulatory Problems     Diagnosis Date Noted    No Active Ambulatory Problems     Resolved Ambulatory Problems     Diagnosis Date Noted    No Resolved Ambulatory Problems     No Additional Past Medical History         PAST SURGICAL HISTORY  No past surgical history on file.      FAMILY HISTORY  No family history on file.      SOCIAL HISTORY  Social History     Socioeconomic History    Marital status:          ALLERGIES  Patient has no known allergies.      REVIEW OF SYSTEMS  Review of all 14 systems is negative other than stated in the HPI above.      PHYSICAL EXAM    I have reviewed the triage vital signs and nursing notes.    ED Triage Vitals   Temp Heart Rate Resp BP SpO2   08/13/24 0229 08/13/24 0226 08/13/24 0226 08/13/24 0226 08/13/24 0226   98.7 °F (37.1 °C) (!) 127 19 141/68 94 %      Temp src Heart Rate Source Patient Position BP Location FiO2 (%)   -- -- -- -- --                GENERAL: awake and alert, no acute distress, slightly diaphoretic  HENT: Normocephalic, small  occipital scalp hematoma, larger hematoma over the lower cervical spine, mild C-spine tenderness.  Superficial tongue abrasion with ecchymosis present  EYES: no scleral icterus, pupils 3 mm reactive to lateral, EOMI  CV: regular rhythm, tachycardic  RESPIRATORY: normal effort  ABDOMEN: soft, nondistended, nontender throughout  MUSCULOSKELETAL: no deformity  NEURO: alert, moves all extremities, follows commands, GCS 15, cranial nerves II through XII intact, speech fluent and clear  PSYCH: calm, cooperative  SKIN: Warm, dry          LAB RESULTS  Recent Results (from the past 24 hour(s))   ECG 12 Lead QT Measurement    Collection Time: 08/13/24  2:52 AM   Result Value Ref Range    QT Interval 319 ms    QTC Interval 487 ms   Comprehensive Metabolic Panel    Collection Time: 08/13/24  3:07 AM    Specimen: Blood   Result Value Ref Range    Glucose 215 (H) 65 - 99 mg/dL    BUN 11 6 - 20 mg/dL    Creatinine 0.82 0.76 - 1.27 mg/dL    Sodium 134 (L) 136 - 145 mmol/L    Potassium 3.4 (L) 3.5 - 5.2 mmol/L    Chloride 94 (L) 98 - 107 mmol/L    CO2 22.0 22.0 - 29.0 mmol/L    Calcium 9.3 8.6 - 10.5 mg/dL    Total Protein 6.9 6.0 - 8.5 g/dL    Albumin 3.7 3.5 - 5.2 g/dL    ALT (SGPT) 63 (H) 1 - 41 U/L    AST (SGOT) 84 (H) 1 - 40 U/L    Alkaline Phosphatase 98 39 - 117 U/L    Total Bilirubin 1.2 0.0 - 1.2 mg/dL    Globulin 3.2 gm/dL    A/G Ratio 1.2 g/dL    BUN/Creatinine Ratio 13.4 7.0 - 25.0    Anion Gap 18.0 (H) 5.0 - 15.0 mmol/L    eGFR 113.9 >60.0 mL/min/1.73   Ethanol    Collection Time: 08/13/24  3:07 AM    Specimen: Blood   Result Value Ref Range    Ethanol <10 0 - 10 mg/dL    Ethanol % <0.010 %   CK    Collection Time: 08/13/24  3:07 AM    Specimen: Blood   Result Value Ref Range    Creatine Kinase 232 (H) 20 - 200 U/L   CBC Auto Differential    Collection Time: 08/13/24  3:07 AM    Specimen: Blood   Result Value Ref Range    WBC 6.11 3.40 - 10.80 10*3/mm3    RBC 4.60 4.14 - 5.80 10*6/mm3    Hemoglobin 15.1 13.0 - 17.7 g/dL     Hematocrit 44.5 37.5 - 51.0 %    MCV 96.7 79.0 - 97.0 fL    MCH 32.8 26.6 - 33.0 pg    MCHC 33.9 31.5 - 35.7 g/dL    RDW 12.4 12.3 - 15.4 %    RDW-SD 44.2 37.0 - 54.0 fl    MPV 10.9 6.0 - 12.0 fL    Platelets 57 (L) 140 - 450 10*3/mm3    Neutrophil % 73.2 42.7 - 76.0 %    Lymphocyte % 12.9 (L) 19.6 - 45.3 %    Monocyte % 12.3 (H) 5.0 - 12.0 %    Eosinophil % 0.8 0.3 - 6.2 %    Basophil % 0.3 0.0 - 1.5 %    Immature Grans % 0.5 0.0 - 0.5 %    Neutrophils, Absolute 4.47 1.70 - 7.00 10*3/mm3    Lymphocytes, Absolute 0.79 0.70 - 3.10 10*3/mm3    Monocytes, Absolute 0.75 0.10 - 0.90 10*3/mm3    Eosinophils, Absolute 0.05 0.00 - 0.40 10*3/mm3    Basophils, Absolute 0.02 0.00 - 0.20 10*3/mm3    Immature Grans, Absolute 0.03 0.00 - 0.05 10*3/mm3   Magnesium    Collection Time: 08/13/24  3:07 AM    Specimen: Blood   Result Value Ref Range    Magnesium 1.2 (L) 1.6 - 2.6 mg/dL   Urine Drug Screen - Urine, Clean Catch    Collection Time: 08/13/24  4:09 AM    Specimen: Urine, Clean Catch   Result Value Ref Range    Amphet/Methamphet, Screen Negative Negative    Barbiturates Screen, Urine Negative Negative    Benzodiazepine Screen, Urine Negative Negative    Cocaine Screen, Urine Negative Negative    Opiate Screen Negative Negative    THC, Screen, Urine Negative Negative    Methadone Screen, Urine Negative Negative    Oxycodone Screen, Urine Negative Negative    Fentanyl, Urine Negative Negative   ECG 12 Lead Rhythm Change    Collection Time: 08/13/24  5:17 AM   Result Value Ref Range    QT Interval 381 ms    QTC Interval 464 ms       The above labs were ordered by me and independently reviewed by me.     RADIOLOGY  CT Cervical Spine Without Contrast    Result Date: 8/13/2024  Patient: MACKENZIE SANCHEZ  Time Out: 05:15 Exam(s): CT C SPINE EXAM:   CT Cervical Spine Without Intravenous Contrast CLINICAL HISTORY:    Reason for exam: seizure, head trauma. TECHNIQUE:   Axial computed tomography images of the cervical spine without  intravenous contrast.  CTDI is 19.54 mGy and DLP is 452.8 mGy-cm.  This CT exam was performed according to the principle of ALARA (As Low As Reasonably Achievable) by using one or more of the following dose reduction techniques: automated exposure control, adjustment of the mA and or kV according to patient size, and or use of iterative reconstruction technique. COMPARISON:   No relevant prior studies available. FINDINGS:   Artifacts:  Motion artifact limits evaluation.  Despite this, no evidence of grossly displaced fracture or dislocation within the spine.  Consider repeat imaging if there is further concern.   Vertebrae:  Loss of cervical lordosis which can be seen with patient positioning or muscle spasm.   Soft tissues:  Unremarkable. IMPRESSION:     1.  Motion artifact limits evaluation.  Despite this, no evidence of grossly displaced fracture or dislocation within the spine.  Consider repeat imaging if there is further concern. 2.  Loss of cervical lordosis which can be seen with patient positioning or muscle spasm.     Electronically signed by Álvaro Rutledge MD on 08-13-24 at 0515    CT Head Without Contrast    Result Date: 8/13/2024  Patient: MACKENZIE SANCHEZ  Time Out: 05:14 Exam(s): CT HEAD Without Contrast EXAM:   CT Head Without Intravenous Contrast CLINICAL HISTORY:    Reason for exam: seizure, head trauma. TECHNIQUE:   Axial computed tomography images of the head brain without intravenous contrast.  CTDI is 55.96 mGy and DLP is 973.2 mGy-cm.  This CT exam was performed according to the principle of ALARA (As Low As Reasonably Achievable) by using one or more of the following dose reduction techniques: automated exposure control, adjustment of the mA and or kV according to patient size, and or use of iterative reconstruction technique. COMPARISON:   No relevant prior studies available. FINDINGS:   Brain:  No acute intracranial abnormality.  Consider MRI if there is further concern.  Areas of decreased  attenuation in the deep cerebral white matter are consistent with small vessel ischemic degenerative changes.  The cerebral and cerebellar sulci are prominent consistent with brain atrophy.  No hemorrhage.   Ventricles:  Unremarkable.  No ventriculomegaly.   Bones joints:  Unremarkable.  No acute fracture.   Soft tissues:  Unremarkable.   Vasculature:  Atherosclerotic disease.   Sinuses:  Unremarkable as visualized.   Mastoid air cells:  Unremarkable as visualized.  No mastoid effusion. IMPRESSION:     1.  No acute intracranial abnormality.  Consider MRI if there is further concern. 2.  Small vessel ischemic degenerative changes. 3.  Cerebral and cerebellar atrophy.     Electronically signed by Álvaro Rutledge MD on 08-13-24 at 0514     The above radiology studies were ordered by me.  See ED course for independent interpretations.     MEDICATIONS GIVEN IN ER  Medications   sodium chloride 0.9 % flush 10 mL (has no administration in time range)   sodium chloride 0.9 % bolus 1,000 mL (1,000 mL Intravenous New Bag 8/13/24 0314)   LORazepam (ATIVAN) injection 1 mg (1 mg Intravenous Given 8/13/24 0311)   potassium chloride (K-DUR,KLOR-CON) ER tablet 40 mEq (40 mEq Oral Given 8/13/24 0353)   metoprolol tartrate (LOPRESSOR) injection 5 mg (5 mg Intravenous Given 8/13/24 0358)   metoprolol tartrate (LOPRESSOR) tablet 25 mg (25 mg Oral Given 8/13/24 0357)         ORDERS PLACED DURING THIS VISIT:  Orders Placed This Encounter   Procedures    CT Head Without Contrast    CT Cervical Spine Without Contrast    Comprehensive Metabolic Panel    Ethanol    Urine Drug Screen - Urine, Clean Catch    CK    CBC Auto Differential    Magnesium    Continuous Pulse Oximetry    Monitor Blood Pressure    [facility] (on-call MD unless specified) Details    ECG 12 Lead QT Measurement    ECG 12 Lead Rhythm Change    Insert Peripheral IV    Inpatient Admission    Seizure Precautions    CBC & Differential         OUTPATIENT MEDICATION  MANAGEMENT:  Current Facility-Administered Medications Ordered in Epic   Medication Dose Route Frequency Provider Last Rate Last Admin    sodium chloride 0.9 % flush 10 mL  10 mL Intravenous PRN Steve Ramírez MD         No current [facility]-ordered outpatient medications on file.         PROCEDURES  Procedures      Critical care provider statement:    Critical care time (minutes): 30.   Critical care time was exclusive of:  Separately billable procedures and treating other patients   Critical care was necessary to treat or prevent imminent or life-threatening deterioration of the following conditions:  CNS Failure   Critical care was time spent personally by me on the following activities:  Development of treatment plan with patient or surrogate, discussions with consultants, evaluation of patient's response to treatment, examination of patient, obtaining history from patient or surrogate, ordering and performing treatments and interventions, ordering and review of laboratory studies, ordering and review of radiographic studies, pulse oximetry, re-evaluation of patient's condition and review of old charts. Critical Care indicators: Seizure, new onset or with disorder with intensive drug management       PROGRESS, DATA ANALYSIS, CONSULTS, AND MEDICAL DECISION MAKING  All labs have been independently interpreted by me.  All radiology studies have been reviewed by me. All EKG's have been independently viewed and interpreted by me.  Discussion below represents my analysis of pertinent findings related to patient's condition, differential diagnosis, treatment plan and final disposition.    Differential diagnosis includes but is not limited to:  Epilepsy  Alcohol withdrawal seizure  Traumatic intracranial hemorrhage  Traumatic cervical spine fracture      Clinical Scores:                  ED Course as of 08/13/24 0535   Tue Aug 13, 2024   0343 EKG          EKG time: 2:52 AM  Rhythm/Rate: A-fib, 140  P waves and MN:  N/A  QRS, axis: Normal axis  ST and T waves: No acute ischemic changes    Interpreted Contemporaneously by me, independently viewed         [JR]   0344 Platelets(!): 57 [JR]   0512 Magnesium(!): 1.2 [JR]   0513 CT brain and cervical spine independently interpreted in PACS and demonstrates no acute intracranial hemorrhage.  There is subcutaneous hematoma present posterior to the cervical spine without acute cervical spine fracture. [JR]   0532 EKG          EKG time: 5:17 AM  Rhythm/Rate: Sinus rhythm, 89  P waves and NV: Normal  QRS, axis: Normal axis  ST and T waves: No acute ischemic changes    Interpreted Contemporaneously by me, independently viewed  Similar compared to prior earlier today however no longer appears to be in atrial fibrillation and rate is slower       [JR]   0533 Discussed with MILY Rivera for [facility], who agrees to admit on behalf of Dr. Sol.   [JR]   0533 Patient with history of alcohol dependence, cirrhosis, presents from the [facility] where he was undergoing alcohol detoxification for new onset seizure.  He did hit the back of his head but he has no evidence of acute intracranial hemorrhage.  He was given Ativan here.  He was tachycardic and appeared to be in atrial fibrillation on EKG for which she was given 5 mg IV Lopressor.  On repeat EKG he now appears to be in sinus rhythm with rate in the 80s.  Considering his alcohol withdrawal seizure, I think that he would benefit from further alcohol detoxification in the hospital. [JR]      ED Course User Index  [JR] Steve Ramírez MD             AS OF 05:35 EDT VITALS:    BP - 145/91  HR - 109  TEMP - 98.7 °F (37.1 °C)  O2 SATS - 93%    COMPLEXITY OF CARE  The patient requires admission.      Chronic or social conditions impacting patient care (Social Determinants of Health):     DIAGNOSIS  Final diagnoses:   Alcohol withdrawal seizure without complication   Hypokalemia   Hypomagnesemia   Atrial fibrillation with rapid ventricular response            DISPOSITION  Admit      Prescription drug monitoring program review:            Steve Ramírez MD  08/13/24 0535      Electronically signed by Steve Ramírez MD at 08/13/24 0535       Rowena Phelps, RN at 08/13/24 0225          Kimi with the [facility] reports that the Pt is at her facility for detox from alcohol and benzos. Pt reportedly had a unwitnessed seizure for approximately 1.5 minutes per [place], she further endorses that she heard a scream from another room and then heard the crash. Then found the Pt bleeding from his mouth and nose, she noted 2 large hematomas one at the base of the skull and on the neck with notable streaking. Also, noted that the Pt had a postictal combative phase lasting longer than 10 minutes, where the Pt was unaware of time and situation. Reports that the Pt has Hx of diabetes, and hypothyroidism, denies any alcohol withdraw seizures in the past.    Electronically signed by Rowena Phelps RN at 08/13/24 0233       Jeanette Sherman RN at 08/13/24 0224          To ER via EMS from [facility].  Sent for seizure.  Nurse was next door and heard pt fall out of bed.  Pt appears to have bit tongue.  At [facility] for detox from ETOH.  Last drink 1 week ago.  Admitted on 8/12 to the [facility].     C/o slight pain to head from fall.    Electronically signed by Jeanette Sherman RN at 08/13/24 0229       Vital Signs (last 2 days)       Date/Time Temp Temp src Pulse Resp BP Patient Position SpO2    08/14/24 1300 -- -- 96 -- 132/94 -- --    08/14/24 1200 -- -- 75 -- 128/82 -- 94    08/14/24 1100 -- -- 78 19 123/78 Lying 92    08/14/24 0700 -- -- 80 19 134/85 Lying 93    08/14/24 0630 -- -- 88 -- 129/79 -- 94    08/14/24 0615 -- -- 90 -- -- -- 97    08/14/24 0600 -- -- 78 -- 137/89 -- 94    08/14/24 0545 -- -- 100 -- -- -- 94    08/14/24 0530 -- -- 93 -- 128/97 -- 95    08/14/24 0515 -- -- 74 -- -- -- 97    08/14/24 0500 98.3 (36.8) Oral 80 19 123/75 Lying 96    08/14/24 0415  -- -- 82 -- -- -- 92    08/14/24 0400 -- -- 77 -- 127/86 -- 93    08/14/24 0345 -- -- 93 -- -- -- 89    08/14/24 0330 -- -- 98 -- 140/42 -- 96    08/14/24 0300 -- -- 93 -- 135/98 -- 97    08/14/24 0245 -- -- 84 -- -- -- 95    08/14/24 0230 -- -- 78 -- 131/89 -- 95    08/14/24 0215 -- -- 76 -- -- -- 95    08/14/24 0200 -- -- 74 -- 135/93 -- 95    08/14/24 0145 -- -- 90 -- -- -- 94    08/14/24 0130 -- -- 96 -- 117/102 -- 96    08/14/24 0000 -- -- 79 -- 137/81 -- 94    08/13/24 2345 -- -- 81 -- -- -- 93    08/13/24 2330 -- -- 105 -- 152/85 -- 93    08/13/24 2315 -- -- 104 -- -- -- 91    08/13/24 2300 -- -- 102 -- 144/99 -- 91    08/13/24 2245 -- -- 70 -- -- -- 97    08/13/24 2230 -- -- 73 -- 148/98 -- 97    08/13/24 2215 -- -- 76 -- -- -- 97    08/13/24 2200 -- -- 80 -- 143/93 -- 98    08/13/24 2145 -- -- 81 -- -- -- 97    08/13/24 2130 -- -- 77 -- 142/89 -- 98    08/13/24 2115 -- -- 74 -- -- -- 97    08/13/24 2100 -- -- 78 -- 134/93 -- 96    08/13/24 2000 -- -- 78 -- 145/89 -- 92    08/13/24 1902 97.8 (36.6) Oral 93 18 142/90 Lying 95    08/13/24 1900 -- -- 97 -- 142/90 -- 94    08/13/24 1830 -- -- 76 -- 145/91 -- 93    08/13/24 1800 -- -- 102 -- 149/131 -- 94    08/13/24 1730 -- -- 87 -- 130/113 -- 96    08/13/24 1700 -- -- 81 -- 132/102 -- 95    08/13/24 1535 -- -- 70 -- 147/91 -- 91    08/13/24 1500 -- -- 87 -- 149/98 -- 91    08/13/24 1453 97.7 (36.5) Oral -- -- -- -- --    08/13/24 1421 -- -- 71 -- 147/94 -- 98    08/13/24 1401 -- -- 68 -- 147/91 -- 98    08/13/24 1341 -- -- 76 -- 149/91 -- 94    08/13/24 1321 -- -- 78 -- 150/90 -- 94    08/13/24 1241 -- -- 85 -- 153/106 -- 93    08/13/24 1210 -- -- 82 -- 151/96 -- 94    08/13/24 1147 -- -- 100 -- 153/93 -- 95    08/13/24 1028 -- -- 81 -- 161/101 -- 96    08/13/24 0947 -- -- 75 -- -- -- 94    08/13/24 0901 -- -- 77 18 153/101 -- 94    08/13/24 0831 -- -- 84 -- 150/99 -- 95    08/13/24 0801 -- -- 86 18 137/91 -- 95    08/13/24 0357 -- -- 109 -- 145/91 -- --     08/13/24 0356 -- -- 118 19 145/91 Lying 93    08/13/24 0307 -- -- 140 -- -- -- 91    08/13/24 0258 -- -- 127 -- 140/96 -- 92    08/13/24 0229 98.7 (37.1) -- -- -- -- -- --    08/13/24 0226 -- -- 127 19 141/68 -- 94          Oxygen Therapy (last 2 days)       Date/Time SpO2 Device (Oxygen Therapy) Flow (L/min) Oxygen Concentration (%) ETCO2 (mmHg)    08/14/24 1600 -- nasal cannula 2 -- --    08/14/24 1200 94 nasal cannula 2 -- --    08/14/24 1100 92 -- -- -- --    08/14/24 0800 -- nasal cannula 2 -- --    08/14/24 0700 93 -- -- -- --    08/14/24 0630 94 -- -- -- --    08/14/24 0615 97 -- -- -- --    08/14/24 0600 94 -- -- -- --    08/14/24 0545 94 -- -- -- --    08/14/24 0530 95 -- -- -- --    08/14/24 0515 97 -- -- -- --    08/14/24 0500 96 nasal cannula 2 -- --    08/14/24 0415 92 -- -- -- --    08/14/24 0400 93 ventilator 2 -- --    08/14/24 0345 89 -- -- -- --    08/14/24 0330 96 -- -- -- --    08/14/24 0300 97 -- -- -- --    08/14/24 0245 95 -- -- -- --    08/14/24 0230 95 -- -- -- --    08/14/24 0215 95 -- -- -- --    08/14/24 0200 95 -- -- -- --    08/14/24 0145 94 -- -- -- --    08/14/24 0130 96 -- -- -- --    08/14/24 0000 94 nasal cannula 2 -- --    08/13/24 2345 93 -- -- -- --    08/13/24 2330 93 -- -- -- --    08/13/24 2315 91 -- -- -- --    08/13/24 2300 91 -- -- -- --    08/13/24 2245 97 -- -- -- --    08/13/24 2230 97 -- -- -- --    08/13/24 2215 97 -- -- -- --    08/13/24 2200 98 -- -- -- --    08/13/24 2145 97 -- -- -- --    08/13/24 2130 98 -- -- -- --    08/13/24 2115 97 -- -- -- --    08/13/24 2100 96 -- -- -- --    08/13/24 2000 92 nasal cannula 2 -- --    08/13/24 1902 95 nasal cannula 2 -- --    08/13/24 1900 94 -- -- -- --    08/13/24 1830 93 -- -- -- --    08/13/24 1800 94 -- -- -- --    08/13/24 1730 96 -- -- -- --    08/13/24 1700 95 -- -- -- --    08/13/24 1535 91 -- -- -- --    08/13/24 1500 91 nasal cannula 2 -- --    08/13/24 1421 98 -- -- -- --    08/13/24 1401 98 -- -- -- --     08/13/24 1341 94 -- -- -- --    08/13/24 1321 94 -- -- -- --    08/13/24 1241 93 -- -- -- --    08/13/24 1210 94 -- -- -- --    08/13/24 1147 95 -- -- -- --    08/13/24 1028 96 -- -- -- --    08/13/24 0947 94 -- -- -- --    08/13/24 0901 94 -- -- -- --    08/13/24 0831 95 -- -- -- --    08/13/24 0801 95 -- -- -- --    08/13/24 0356 93 room air -- -- --    08/13/24 0307 91 -- -- -- --    08/13/24 0258 92 -- -- -- --    08/13/24 0226 94 nasal cannula 3 -- --          Lines, Drains & Airways       Active LDAs       Name Placement date Placement time Site Days    Peripheral IV 08/13/24 1128 Right Antecubital 08/13/24  1128  Antecubital  1    Peripheral IV 08/13/24 1240 Anterior;Left Forearm 08/13/24  1240  Forearm  1              Inactive LDAs       Name Placement date Placement time Removal date Removal time Site Days    [REMOVED] Peripheral IV 08/13/24 Antecubital 08/13/24  --  08/13/24  1127  Antecubital  less than 1                  CIWA (last 2 days)        Date/Time CIWA-Ar Score    08/14/24 1600 12     08/14/24 1300 14     08/14/24 1200 11     08/14/24 0400 11     08/14/24 0300 11     08/14/24 0000 14     08/13/24 2300 17     08/13/24 2000 11     08/13/24 1937 11     08/13/24 1500 11     08/13/24 13:22:09 17     08/13/24 12:55:36 13 08/13/24 12:12:48 28 08/13/24 11:49:47 28 08/13/24 10:25:01 16 08/13/24 10:00:22 17 08/13/24 08:10:09 13 08/13/24 07:21:45 0                   Facility-Administered Medications as of 8/14/2024   Medication Dose Route Frequency Provider Last Rate Last Admin    acetaminophen (TYLENOL) tablet 650 mg  650 mg Oral Q4H PRN Nicole Leblanc APRN        [Held by provider] amitriptyline (ELAVIL) tablet 10 mg  10 mg Oral Nightly Eric Lehman MD        sennosides-docusate (PERICOLACE) 8.6-50 MG per tablet 2 tablet  2 tablet Oral BID PRN Nicole Leblanc APRN        And    polyethylene glycol (MIRALAX) packet 17 g  17 g Oral Daily PRN Nicole Leblanc, APRN         And    bisacodyl (DULCOLAX) EC tablet 5 mg  5 mg Oral Daily PRN Nicole Leblanc, APRBRADEN        And    bisacodyl (DULCOLAX) suppository 10 mg  10 mg Rectal Daily PRN Nicole Leblanc, APRN        Calcium Replacement - Follow Nurse / BPA Driven Protocol   Does not apply PRN Eric Lehman MD        cloNIDine (CATAPRES) tablet 0.1 mg  0.1 mg Oral Q4H PRN Eric Lehman MD        dextrose (D50W) (25 g/50 mL) IV injection 25 g  25 g Intravenous Q15 Min PRN Eric Lehman MD        dextrose (GLUTOSE) oral gel 15 g  15 g Oral Q15 Min PRN Eric Lehman MD        Enoxaparin Sodium (LOVENOX) syringe 40 mg  40 mg Subcutaneous Daily Eric Lehman MD   40 mg at 08/14/24 0837    folic acid (FOLVITE) tablet 1 mg  1 mg Oral Daily Eric Lehman MD   1 mg at 08/14/24 0837    [Held by provider] furosemide (LASIX) tablet 20 mg  20 mg Oral Daily Eric Lehman MD        glucagon (GLUCAGEN) injection 1 mg  1 mg Intramuscular Q15 Min PRN Eric Lheman MD        insulin glargine (LANTUS, SEMGLEE) injection 10 Units  10 Units Subcutaneous Nightly Eric Lehman MD   10 Units at 08/13/24 2034    insulin regular (humuLIN R,novoLIN R) injection 2-9 Units  2-9 Units Subcutaneous Q6H Eric Lehman MD        levETIRAcetam (KEPPRA) injection 500 mg  500 mg Intravenous Q12H Jeanie Hall MD   500 mg at 08/14/24 0837    [START ON 8/15/2024] levETIRAcetam (KEPPRA) tablet 250 mg  250 mg Oral BID Jeanie Hall MD        levothyroxine (SYNTHROID, LEVOTHROID) tablet 112 mcg  112 mcg Oral Daily Eric Lehman MD   112 mcg at 08/14/24 0837    [COMPLETED] LORazepam (ATIVAN) injection 1 mg  1 mg Intravenous Once Steve Ramírez MD   1 mg at 08/13/24 0311    LORazepam (ATIVAN) tablet 1 mg  1 mg Oral Q1H PRN Eric Lehman MD        Or    LORazepam (ATIVAN) injection 1 mg  1 mg Intravenous Q1H PRN Eric Lehman MD        Or    LORazepam (ATIVAN) tablet 2 mg  2 mg Oral Q1H PRN Eric Lehman MD        Or    LORazepam (ATIVAN) injection 2 mg  2 mg  Intravenous Q1H PRN Eric Lehman MD   2 mg at 08/14/24 1449    Or    LORazepam (ATIVAN) injection 2 mg  2 mg Intravenous Q15 Min PRN Eric Lehman MD   2 mg at 08/14/24 1559    Or    LORazepam (ATIVAN) injection 2 mg  2 mg Intramuscular Q15 Min PRN Eric Lehman MD        Magnesium Standard Dose Replacement - Follow Nurse / BPA Driven Protocol   Does not apply PRN Eric Lehman MD        Magnesium Standard Dose Replacement - Follow Nurse / BPA Driven Protocol   Does not apply PRN Eric Lehman MD        [COMPLETED] magnesium sulfate in D5W 1g/100mL (PREMIX)  1 g Intravenous Q1H Eric Lehman MD   Stopped at 08/13/24 1414    [COMPLETED] metoprolol tartrate (LOPRESSOR) injection 5 mg  5 mg Intravenous Once Steve Ramírez MD   5 mg at 08/13/24 0358    [COMPLETED] metoprolol tartrate (LOPRESSOR) tablet 25 mg  25 mg Oral Once Steve Ramírez MD   25 mg at 08/13/24 0357    multivitamin with minerals 1 tablet  1 tablet Oral Daily Eric Lehman MD   1 tablet at 08/14/24 0837    nicotine (NICODERM CQ) 21 MG/24HR patch 1 patch  1 patch Transdermal Q24H Eric Lehman MD   1 patch at 08/14/24 1316    nicotine polacrilex (NICORETTE) gum 4 mg  4 mg Mouth/Throat Q1H PRN Eric Lehman MD        nitroglycerin (NITROSTAT) SL tablet 0.4 mg  0.4 mg Sublingual Q5 Min PRN Nicole Leblanc APRN        nitroglycerin (NITROSTAT) SL tablet 0.4 mg  0.4 mg Sublingual Q5 Min PRN Eric Lehman MD        ondansetron ODT (ZOFRAN-ODT) disintegrating tablet 4 mg  4 mg Oral Q6H PRN Nicole Leblanc APRN        Or    ondansetron (ZOFRAN) injection 4 mg  4 mg Intravenous Q6H PRN Nicole Leblanc APRN        [COMPLETED] PHENobarbital 291.9 mg in sodium chloride 0.9 % 100 mL IVPB  4 mg/kg (Ideal) Intravenous Once Eric Lehman MD   Stopped at 08/13/24 1201    Followed by    [COMPLETED] PHENobarbital 219.1 mg in sodium chloride 0.9 % 100 mL IVPB  3 mg/kg (Ideal) Intravenous Once Eric Lehman  mL/hr at 08/13/24 1526 219.1 mg  at 08/13/24 1526    Followed by    [COMPLETED] PHENobarbital 219.1 mg in sodium chloride 0.9 % 100 mL IVPB  3 mg/kg (Ideal) Intravenous Once Eric Lehman  mL/hr at 08/13/24 2032 219.1 mg at 08/13/24 2032    [COMPLETED] PHENobarbital injection 65 mg  65 mg Intravenous Once Eric Lehman MD   65 mg at 08/14/24 0506    Followed by    PHENobarbital injection 65 mg  65 mg Intravenous Once Eric Lehman MD        Followed by    [START ON 8/15/2024] PHENobarbital tablet 32.4 mg  32.4 mg Oral Once Eric Lehman MD        Followed by    [START ON 8/15/2024] PHENobarbital tablet 32.4 mg  32.4 mg Oral Once Eric Lehman MD        Followed by    [START ON 8/16/2024] PHENobarbital tablet 32.4 mg  32.4 mg Oral Once Eric Lehman MD        Phosphorus Replacement - Follow Nurse / BPA Driven Protocol   Does not apply PRN Eric Lehman MD        [COMPLETED] potassium chloride (K-DUR,KLOR-CON) ER tablet 40 mEq  40 mEq Oral Once Steve Ramírez MD   40 mEq at 08/13/24 0353    [COMPLETED] potassium chloride (K-DUR,KLOR-CON) ER tablet 40 mEq  40 mEq Oral Once Eric Lehman MD   40 mEq at 08/13/24 0804    Potassium Replacement - Follow Nurse / BPA Driven Protocol   Does not apply PRN Nicole Leblanc APRN        Potassium Replacement - Follow Nurse / BPA Driven Protocol   Does not apply PRN Eric Lehman MD        pregabalin (LYRICA) capsule 300 mg  300 mg Oral BID Eric Lehman MD   300 mg at 08/14/24 0837    [COMPLETED] sodium chloride 0.9 % bolus 1,000 mL  1,000 mL Intravenous Once Steve Ramírez MD   Stopped at 08/13/24 0809    sodium chloride 0.9 % flush 10 mL  10 mL Intravenous PRN Steve Ramírez MD        sodium chloride 0.9 % flush 10 mL  10 mL Intravenous Q12H Eric Lehman MD   10 mL at 08/13/24 2038    sodium chloride 0.9 % flush 10 mL  10 mL Intravenous PRN Eric Lehman MD        sodium chloride 0.9 % infusion 40 mL  40 mL Intravenous PRN Eric Lehman MD        [Held by provider]  "spironolactone (ALDACTONE) tablet 50 mg  50 mg Oral Daily Eric Lehman MD        thiamine (B-1) 500 mg in sodium chloride 0.9 % 100 mL IVPB  500 mg Intravenous Q8H Eric Lehman  mL/hr at 08/14/24 1449 500 mg at 08/14/24 1449    Followed by    [START ON 8/16/2024] thiamine (B-1) injection 200 mg  200 mg Intravenous Q8H Eric Lehman MD        Followed by    [START ON 8/20/2024] thiamine (VITAMIN B-1) tablet 100 mg  100 mg Oral Daily Eric Lehman MD         Orders (last 48 hrs)        Start     Ordered    08/20/24 0900  thiamine (VITAMIN B-1) tablet 100 mg  Daily        Placed in \"Followed by\" Linked Group    08/13/24 0621    08/16/24 0727  PHENobarbital tablet 32.4 mg  Once        Placed in \"Followed by\" Linked Group    08/13/24 1127    08/16/24 0600  thiamine (B-1) injection 200 mg  Every 8 Hours Scheduled        Placed in \"Followed by\" Linked Group    08/13/24 0621    08/15/24 1727  PHENobarbital tablet 32.4 mg  Once        Placed in \"Followed by\" Linked Group    08/13/24 1127    08/15/24 0900  levETIRAcetam (KEPPRA) tablet 250 mg  2 Times Daily        Note to Pharmacy: For tube route administration disperse crushed tablets in 10 mL of water, shake for 5 minutes to dissolve, and administer immediately via enteral feeding tube.    08/13/24 1738    08/15/24 0527  PHENobarbital tablet 32.4 mg  Once        Placed in \"Followed by\" Linked Group    08/13/24 1127    08/14/24 1727  PHENobarbital injection 65 mg  Once        Placed in \"Followed by\" Linked Group    08/13/24 1127    08/14/24 1247  POC Glucose Once  PROCEDURE ONCE        Comments: Complete no more than 45 minutes prior to patient eating      08/14/24 1246    08/14/24 1153  Diet: Liquid; Full Liquid; Fluid Consistency: Thin (IDDSI 0)  Diet Effective Now         08/14/24 1152    08/14/24 0734  POC Glucose Once  PROCEDURE ONCE        Comments: Complete no more than 45 minutes prior to patient eating      08/14/24 0732    08/14/24 0702  Inpatient Neurology " "Consult General  IN AM        Specialty:  Neurology  Provider:  Jeanie Hall MD    08/13/24 0732    08/14/24 0600  Basic Metabolic Panel  Morning Draw,   Status:  Canceled         08/13/24 0732    08/14/24 0600  CBC (No Diff)  Morning Draw         08/13/24 0732    08/14/24 0600  Comprehensive Metabolic Panel  Morning Draw         08/13/24 1027    08/14/24 0600  Magnesium  Morning Draw         08/13/24 1027    08/14/24 0600  Phosphorus  Morning Draw         08/13/24 1027    08/14/24 0528  PHENobarbital injection 65 mg  Once        Placed in \"Followed by\" Linked Group    08/13/24 1127    08/14/24 0500  POC Glucose Once  PROCEDURE ONCE        Comments: Complete no more than 45 minutes prior to patient eating      08/14/24 0458    08/14/24 0034  POC Glucose Once  PROCEDURE ONCE        Comments: Complete no more than 45 minutes prior to patient eating      08/14/24 0031    08/13/24 2157  POC Glucose Once  PROCEDURE ONCE        Comments: Complete no more than 45 minutes prior to patient eating      08/13/24 2155    08/13/24 2100  insulin glargine (LANTUS, SEMGLEE) injection 10 Units  Nightly         08/13/24 1030    08/13/24 2100  [Held by provider]  amitriptyline (ELAVIL) tablet 10 mg  Nightly        (On hold since yesterday at 1030 until manually unheld; held by Eric Lehman MDHold Reason: Hold For Procedure)    08/13/24 1030    08/13/24 2100  levETIRAcetam (KEPPRA) injection 500 mg  Every 12 Hours Scheduled         08/13/24 1500    08/13/24 2002  POC Glucose Once  PROCEDURE ONCE        Comments: Complete no more than 45 minutes prior to patient eating      08/13/24 1959    08/13/24 1837  POC Glucose Once  PROCEDURE ONCE        Comments: Complete no more than 45 minutes prior to patient eating      08/13/24 1835    08/13/24 1800  Oral Care  2 Times Daily       08/13/24 1027    08/13/24 1800  PHENobarbital 219.1 mg in sodium chloride 0.9 % 100 mL IVPB  Once        Placed in \"Followed by\" Linked Group    08/13/24 " "1127    08/13/24 1700  POC Glucose 4x Daily Before Meals & at Bedtime  4 Times Daily Before Meals & at Bedtime      Comments: Complete no more than 45 minutes prior to patient eating      08/13/24 1219    08/13/24 1503  POC Glucose Once  PROCEDURE ONCE        Comments: Complete no more than 45 minutes prior to patient eating      08/13/24 1451    08/13/24 1500  PHENobarbital 219.1 mg in sodium chloride 0.9 % 100 mL IVPB  Once        Placed in \"Followed by\" Linked Group    08/13/24 1127    08/13/24 1500  Inpatient Neurology Consult General  Once        Specialty:  Neurology  Provider:  Manish Darden MD    08/13/24 1500    08/13/24 1341  Basic Metabolic Panel  Once         08/13/24 1340    08/13/24 1336  NPO Diet NPO Type: Strict NPO  Diet Effective Now,   Status:  Canceled        Comments: Should Remain in Effect Until a Complete SLP Evaluation Occurs & a Superceding Order is Received    08/13/24 1335    08/13/24 1336  SLP Consult: Eval & Treat RN Dysphagia Screen Failed  Once         08/13/24 1335    08/13/24 1257  Transfer Patient  Once         08/13/24 1256    08/13/24 1235  insulin regular (humuLIN R,novoLIN R) injection 2-9 Units  Every 6 Hours Scheduled         08/13/24 1219    08/13/24 1232  POC Glucose Once  PROCEDURE ONCE        Comments: Complete no more than 45 minutes prior to patient eating      08/13/24 1230    08/13/24 1219  glucagon (GLUCAGEN) injection 1 mg  Every 15 Minutes PRN         08/13/24 1219    08/13/24 1219  dextrose (GLUTOSE) oral gel 15 g  Every 15 Minutes PRN         08/13/24 1219    08/13/24 1219  dextrose (D50W) (25 g/50 mL) IV injection 25 g  Every 15 Minutes PRN         08/13/24 1219    08/13/24 1213  Inpatient Pulmonology Consult  Once        Specialty:  Pulmonary Disease  Provider:  (Not yet assigned)    08/13/24 1213    08/13/24 1200  Vital Signs  Every 4 Hours       08/13/24 1027    08/13/24 1200  PHENobarbital 291.9 mg in sodium chloride 0.9 % 100 mL IVPB  Once      " "  Placed in \"Followed by\" Linked Group    08/13/24 1127    08/13/24 1135  Restraints non-violent or non-self destructive  Calendar Day         08/13/24 1134    08/13/24 1130  levothyroxine (SYNTHROID, LEVOTHROID) tablet 112 mcg  Daily         08/13/24 1030    08/13/24 1130  [Held by provider]  spironolactone (ALDACTONE) tablet 50 mg  Daily        (On hold since yesterday at 1030 until manually unheld; held by Eric Lehman MDHold Reason: Hold For Procedure)    08/13/24 1030    08/13/24 1130  [Held by provider]  furosemide (LASIX) tablet 20 mg  Daily        (On hold since yesterday at 1030 until manually unheld; held by Eric Lehman MDHold Reason: Hold For Procedure)    08/13/24 1030    08/13/24 1130  pregabalin (LYRICA) capsule 300 mg  2 Times Daily         08/13/24 1030    08/13/24 1128  Initiate Alcohol Withdrawal Protocol  Once         08/13/24 1127    08/13/24 1128  Vital Signs  Per Hospital Policy         08/13/24 1127    08/13/24 1128  Continuous Pulse Oximetry  Continuous         08/13/24 1127    08/13/24 1128  Obtain Baseline Clinical [place] Withdrawal Assessment - Ar (CIWA-Ar), Sedation Scale & Vital Signs  Once        Comments: Follow CIWA Scoring Reference Guide    08/13/24 1127    08/13/24 1128  Clinical [place] Withdrawal Assessment (CIWA-Ar)  Per Hospital Policy         08/13/24 1127    08/13/24 1128  If CIWA-Ar Score Less Than 8 For 3 Consecutive Assessments, Monitor Every 4 Hours & Discontinue Assessment When CIWA-Ar Less Than 8 for 24 Hours  Per Hospital Policy        Comments: Contact Provider to Restart Protocol if Any Symptoms Reappear    08/13/24 1127    08/13/24 1128  Seizure Precautions  Continuous         08/13/24 1127    08/13/24 1128  Notify Provider - Withdrawal  Continuous        Comments: Open Order Report to View Parameters Requiring Provider Notification    08/13/24 1127    08/13/24 1128  Notify Provider of Abnormal Lab Results  Continuous        Comments: Open Order Report to " View Parameters Requiring Provider Notification    08/13/24 1127    08/13/24 1128  Notify Provider - Vitals  Continuous        Comments: Open Order Report to View Parameters Requiring Provider Notification    08/13/24 1127    08/13/24 1128  Safety Precautions  Continuous         08/13/24 1127    08/13/24 1100  nicotine (NICODERM CQ) 21 MG/24HR patch 1 patch  Every 24 Hours         08/13/24 1027    08/13/24 1043  sodium chloride 0.9 % flush 10 mL  Every 12 Hours Scheduled         08/13/24 1027    08/13/24 1043  Enoxaparin Sodium (LOVENOX) syringe 40 mg  Daily         08/13/24 1027    08/13/24 1032  Legal Status 72hr Hold  Continuous         08/13/24 1031    08/13/24 1028  Continuous Cardiac Monitoring  Continuous        Comments: Follow Standing Orders As Outlined in Process Instructions (Open Order Report to View Full Instructions)    08/13/24 1027    08/13/24 1028  Maintain IV Access  Continuous         08/13/24 1027    08/13/24 1028  Telemetry - Place Orders & Notify Provider of Results When Patient Experiences Acute Chest Pain, Dysrhythmia or Respiratory Distress  Continuous        Comments: Open Order Report to View Parameters Requiring Provider Notification    08/13/24 1027    08/13/24 1028  May Be Off Telemetry for Tests  Continuous         08/13/24 1027    08/13/24 1027  nicotine polacrilex (NICORETTE) gum 4 mg  Every 1 Hour PRN         08/13/24 1027    08/13/24 1027  nitroglycerin (NITROSTAT) SL tablet 0.4 mg  Every 5 Minutes PRN         08/13/24 1027    08/13/24 1027  Diet: Regular/House; Fluid Consistency: Thin (IDDSI 0)  Diet Effective Now,   Status:  Canceled         08/13/24 1027    08/13/24 1027  Intake & Output  Every Shift       08/13/24 1027    08/13/24 1027  Weigh Patient  Once         08/13/24 1027    08/13/24 1027  Insert Peripheral IV  Once         08/13/24 1027    08/13/24 1027  Saline Lock & Maintain IV Access  Continuous,   Status:  Canceled         08/13/24 1027    08/13/24 1026  sodium  "chloride 0.9 % flush 10 mL  As Needed         08/13/24 1027    08/13/24 1026  sodium chloride 0.9 % infusion 40 mL  As Needed         08/13/24 1027    08/13/24 1026  sennosides-docusate (PERICOLACE) 8.6-50 MG per tablet 2 tablet  2 Times Daily PRN,   Status:  Discontinued        Placed in \"And\" Linked Group    08/13/24 1027    08/13/24 1026  polyethylene glycol (MIRALAX) packet 17 g  Daily PRN,   Status:  Discontinued        Placed in \"And\" Linked Group    08/13/24 1027    08/13/24 1026  bisacodyl (DULCOLAX) EC tablet 5 mg  Daily PRN,   Status:  Discontinued        Placed in \"And\" Linked Group    08/13/24 1027    08/13/24 1026  bisacodyl (DULCOLAX) suppository 10 mg  Daily PRN,   Status:  Discontinued        Placed in \"And\" Linked Group    08/13/24 1027    08/13/24 1026  Potassium Replacement - Follow Nurse / BPA Driven Protocol  As Needed         08/13/24 1027    08/13/24 1026  Magnesium Standard Dose Replacement - Follow Nurse / BPA Driven Protocol  As Needed         08/13/24 1027    08/13/24 1026  Phosphorus Replacement - Follow Nurse / BPA Driven Protocol  As Needed         08/13/24 1027    08/13/24 1026  Calcium Replacement - Follow Nurse / BPA Driven Protocol  As Needed         08/13/24 1027    08/13/24 0900  folic acid (FOLVITE) tablet 1 mg  Daily         08/13/24 0621    08/13/24 0900  multivitamin with minerals 1 tablet  Daily         08/13/24 0735    08/13/24 0809  POC Glucose Once  PROCEDURE ONCE        Comments: Complete no more than 45 minutes prior to patient eating      08/13/24 0807    08/13/24 0800  potassium chloride (K-DUR,KLOR-CON) ER tablet 40 mEq  Once         08/13/24 0631    08/13/24 0800  Vital Signs  Every 4 Hours      Comments: Per per hospital policy    08/13/24 0732    08/13/24 0800  Oral Care  2 Times Daily       08/13/24 0732    08/13/24 0800  Strict Intake & Output  Every Hour       08/13/24 0732    08/13/24 0748  sodium chloride 0.9 % infusion  Continuous,   Status:  Discontinued     " "    08/13/24 0732    08/13/24 0734  LORazepam (ATIVAN) tablet 1 mg  Every 1 Hour PRN        Placed in \"Or\" Linked Group    08/13/24 0735    08/13/24 0734  LORazepam (ATIVAN) injection 1 mg  Every 1 Hour PRN        Placed in \"Or\" Linked Group    08/13/24 0735    08/13/24 0734  LORazepam (ATIVAN) tablet 2 mg  Every 1 Hour PRN        Placed in \"Or\" Linked Group    08/13/24 0735    08/13/24 0734  LORazepam (ATIVAN) injection 2 mg  Every 1 Hour PRN        Placed in \"Or\" Linked Group    08/13/24 0735    08/13/24 0734  LORazepam (ATIVAN) injection 2 mg  Every 15 Minutes PRN        Placed in \"Or\" Linked Group    08/13/24 0735    08/13/24 0734  LORazepam (ATIVAN) injection 2 mg  Every 15 Minutes PRN        Placed in \"Or\" Linked Group    08/13/24 0735    08/13/24 0734  Daily Weights  Daily       08/13/24 0732    08/13/24 0734  Initiate Alcohol Withdrawal Protocol  Once         08/13/24 0735    08/13/24 0734  Vital Signs  Per Hospital Policy         08/13/24 0735    08/13/24 0734  Continuous Pulse Oximetry  Continuous,   Status:  Canceled         08/13/24 0735    08/13/24 0734  Obtain baseline Clinical [place] Withdrawal Assessment - Ar, sedation scale, and vital signs  Until Discontinued        Comments: See hyperlink for CIWA scoring reference guide.    08/13/24 0735    08/13/24 0734  Clinical [place] Withdrawal Assessment (CIWA-Ar)  Per Hospital Policy         08/13/24 0735    08/13/24 0734  If CIWA-Ar Score Less Than 8 For 3 Consecutive Assessments, Monitor Every 4 Hours & Discontinue Assessment When CIWA-Ar Less Than 8 for 24 Hours  Per Hospital Policy        Comments: Contact Provider to Restart Protocol if Any Symptoms Reappear    08/13/24 0735    08/13/24 0734  Obtain pre and post sedation scores with every Lorazepam dose  Once        Comments: Hold Lorazepam for POSS greater than 2 or RASS of-3, -4, -5.     08/13/24 0735    08/13/24 0734  Seizure Precautions  Continuous         08/13/24 0735 08/13/24 0734  " Notify physician for breakthrough symptoms of withdrawal and to restart protocol  Until Discontinued        Comments: Including: anxiety, agitation, severe vomiting, seizure activity.    08/13/24 0735    08/13/24 0734  Notify physician of abnormal lab results  Until Discontinued         08/13/24 0735    08/13/24 0734  Notify physician  Until Discontinued         08/13/24 0735    08/13/24 0734  Fall Precautions  Continuous         08/13/24 0735    08/13/24 0734  Safety Precautions  Continuous         08/13/24 0735    08/13/24 0734  Inpatient consult to Access Center  Once        Provider:  (Not yet assigned)    08/13/24 0735    08/13/24 0733  cloNIDine (CATAPRES) tablet 0.1 mg  Every 4 Hours PRN         08/13/24 0735    08/13/24 0732  acetaminophen (TYLENOL) tablet 650 mg  Every 4 Hours PRN         08/13/24 0733    08/13/24 0732  Potassium Replacement - Follow Nurse / BPA Driven Protocol  As Needed         08/13/24 0732    08/13/24 0732  Place Sequential Compression Device  Once         08/13/24 0732    08/13/24 0732  Maintain Sequential Compression Device  Continuous         08/13/24 0732    08/13/24 0732  Continuous Pulse Oximetry  Continuous,   Status:  Canceled         08/13/24 0732    08/13/24 0731  Code Status and Medical Interventions: CPR (Attempt to Resuscitate); Full  Continuous         08/13/24 0732    08/13/24 0731  Continuous Cardiac Monitoring  Continuous,   Status:  Canceled        Comments: Follow Standing Orders As Outlined in Process Instructions (Open Order Report to View Full Instructions)    08/13/24 0732    08/13/24 0731  Maintain IV Access  Continuous,   Status:  Canceled         08/13/24 0732    08/13/24 0731  Telemetry - Place Orders & Notify Provider of Results When Patient Experiences Acute Chest Pain, Dysrhythmia or Respiratory Distress  Continuous        Comments: Open Order Report to View Parameters Requiring Provider Notification    08/13/24 0732    08/13/24 0731  May Be Off Telemetry  "for Tests  Continuous         08/13/24 0732    08/13/24 0731  Diet: Cardiac; Healthy Heart (2-3 Na+); Fluid Consistency: Thin (IDDSI 0)  Diet Effective Now,   Status:  Canceled         08/13/24 0732    08/13/24 0730  nitroglycerin (NITROSTAT) SL tablet 0.4 mg  Every 5 Minutes PRN         08/13/24 0732 08/13/24 0730  sennosides-docusate (PERICOLACE) 8.6-50 MG per tablet 2 tablet  2 Times Daily PRN        Placed in \"And\" Linked Group    08/13/24 0732    08/13/24 0730  polyethylene glycol (MIRALAX) packet 17 g  Daily PRN        Placed in \"And\" Linked Group    08/13/24 0732    08/13/24 0730  bisacodyl (DULCOLAX) EC tablet 5 mg  Daily PRN        Placed in \"And\" Linked Group    08/13/24 0732 08/13/24 0730  bisacodyl (DULCOLAX) suppository 10 mg  Daily PRN        Placed in \"And\" Linked Group    08/13/24 0732    08/13/24 0730  ondansetron ODT (ZOFRAN-ODT) disintegrating tablet 4 mg  Every 6 Hours PRN        Placed in \"Or\" Linked Group    08/13/24 0732    08/13/24 0730  ondansetron (ZOFRAN) injection 4 mg  Every 6 Hours PRN        Placed in \"Or\" Linked Group    08/13/24 0732    08/13/24 0646  magnesium sulfate in D5W 1g/100mL (PREMIX)  Every 1 Hour         08/13/24 0631    08/13/24 0637  thiamine (B-1) 500 mg in sodium chloride 0.9 % 100 mL IVPB  Every 8 Hours Scheduled        Placed in \"Followed by\" Linked Group    08/13/24 0621    08/13/24 0622  Initiate Alcohol Withdrawal Protocol  Once         08/13/24 0621 08/13/24 0622  Vital Signs  Per Hospital Policy         08/13/24 0621 08/13/24 0622  Continuous Pulse Oximetry  Continuous,   Status:  Canceled         08/13/24 0621    08/13/24 0622  Obtain Baseline Clinical [place] Withdrawal Assessment - Ar (CIWA-Ar), Sedation Scale & Vital Signs  Once        Comments: Follow CIWA Scoring Reference Guide    08/13/24 0621 08/13/24 0622  Clinical [place] Withdrawal Assessment (MEGHAN-Ar)  Per Hospital Policy         08/13/24 0621 08/13/24 0622  If CIWA-Ar Score " "Less Than 8 For 3 Consecutive Assessments, Monitor Every 4 Hours & Discontinue Assessment When CIWA-Ar Less Than 8 for 24 Hours  Per Hospital Policy        Comments: Contact Provider to Restart Protocol if Any Symptoms Reappear    08/13/24 0621    08/13/24 0622  Seizure Precautions  Continuous         08/13/24 0621    08/13/24 0622  Notify Provider - Withdrawal  Continuous        Comments: Open Order Report to View Parameters Requiring Provider Notification    08/13/24 0621    08/13/24 0622  Notify Provider of Abnormal Lab Results  Continuous        Comments: Open Order Report to View Parameters Requiring Provider Notification    08/13/24 0621    08/13/24 0622  Notify Provider - Vitals  Continuous        Comments: Open Order Report to View Parameters Requiring Provider Notification    08/13/24 0621    08/13/24 0622  Safety Precautions  Continuous         08/13/24 0621    08/13/24 0621  Magnesium Standard Dose Replacement - Follow Nurse / BPA Driven Protocol  As Needed         08/13/24 0621    08/13/24 0535  Inpatient Admission  Once         08/13/24 0534    08/13/24 0522  [facility]HA (on-call MD unless specified) Details  Once        Specialty:  Hospitalist  Provider:  (Not yet assigned)    08/13/24 0521    08/13/24 0514  ECG 12 Lead Rhythm Change  Once         08/13/24 0513    08/13/24 0401  metoprolol tartrate (LOPRESSOR) tablet 25 mg  Once         08/13/24 0345    08/13/24 0359  metoprolol tartrate (LOPRESSOR) injection 5 mg  Once         08/13/24 0343    08/13/24 0358  potassium chloride (K-DUR,KLOR-CON) ER tablet 40 mEq  Once         08/13/24 0342    08/13/24 0343  Magnesium  Once         08/13/24 0342    08/13/24 0319  Manual Differential  Once,   Status:  Canceled         08/13/24 0318    08/13/24 0313  sodium chloride 0.9 % bolus 1,000 mL  Once         08/13/24 0257    08/13/24 0313  LORazepam (ATIVAN) injection 1 mg  Once         08/13/24 0257    08/13/24 0248  Insert Peripheral IV  Once        Placed in \"And\" " "Linked Group    08/13/24 0247    08/13/24 0248  Cardiac Monitoring  Continuous,   Status:  Canceled        Comments: Follow Standing Orders As Outlined in Process Instructions (Open Order Report to View Full Instructions)    08/13/24 0247    08/13/24 0248  Continuous Pulse Oximetry  Continuous,   Status:  Canceled         08/13/24 0247    08/13/24 0248  Monitor Blood Pressure  Per Hospital Policy         08/13/24 0247    08/13/24 0248  Ethanol  Once         08/13/24 0247    08/13/24 0248  Urine Drug Screen - Urine, Clean Catch  Once         08/13/24 0247    08/13/24 0248  CK  Once         08/13/24 0247    08/13/24 0248  ECG 12 Lead QT Measurement  Once         08/13/24 0247    08/13/24 0248  Seizure Precautions  Continuous         08/13/24 0247    08/13/24 0247  sodium chloride 0.9 % flush 10 mL  As Needed        Placed in \"And\" Linked Group    08/13/24 0247    08/13/24 0246  CT Head Without Contrast  1 Time Imaging         08/13/24 0247    08/13/24 0246  CT Cervical Spine Without Contrast  1 Time Imaging         08/13/24 0247    08/13/24 0246  CBC & Differential  Once         08/13/24 0247    08/13/24 0246  Comprehensive Metabolic Panel  Once         08/13/24 0247    08/13/24 0246  CBC Auto Differential  PROCEDURE ONCE         08/13/24 0247    08/05/24 0000  Januvia 50 MG tablet  Daily         08/13/24 0800    07/31/24 0000  insulin aspart (NovoLOG FlexPen) 100 UNIT/ML solution pen-injector sc pen         08/13/24 0800    07/30/24 0000  nortriptyline (PAMELOR) 10 MG capsule  Nightly         08/13/24 0800    07/28/24 0000  spironolactone (ALDACTONE) 50 MG tablet  Daily         08/13/24 0800    07/19/24 0000  amitriptyline (ELAVIL) 10 MG tablet  Nightly         08/13/24 0747    07/17/24 0000  Continuous Glucose Sensor (Dexcom G7 Sensor) misc         08/13/24 0747    07/08/24 0000  levETIRAcetam (KEPPRA) 500 MG tablet  Every 12 Hours Scheduled,   Status:  Discontinued         08/13/24 0800    06/26/24 0000  " clindamycin (CLEOCIN) 300 MG capsule  Status:  Discontinued         08/13/24 0747    06/25/24 0000  meloxicam (MOBIC) 15 MG tablet  Daily,   Status:  Discontinued         08/13/24 0800 06/21/24 0000  furosemide (LASIX) 20 MG tablet  Daily         08/13/24 0759 06/19/24 0000  levothyroxine (SYNTHROID, LEVOTHROID) 112 MCG tablet  Daily         08/13/24 0800    Unscheduled  Obtain Pre & Post Sedation Scores With Every Sedative Dose - Hold For POSS Greater Than 2 or RASS of -3 or Less  As Needed       08/13/24 0621    Unscheduled  Up with assistance  As Needed       08/13/24 0732    Unscheduled  Oxygen Therapy- Nasal Cannula; Titrate 1-6 LPM Per SpO2; 90 - 95%  Continuous PRN       08/13/24 0732    Unscheduled  Obtain Pre & Post Sedation Scores With Every Sedative Dose - Hold For POSS Greater Than 2 or RASS of -3 or Less  As Needed       08/13/24 1127    Unscheduled  Follow Hypoglycemia Standing Orders For Blood Glucose <70 & Notify Provider of Treatment  As Needed      Comments: Follow Hypoglycemia Orders As Outlined in Process Instructions (Open Order Report to View Full Instructions)  Notify Provider Any Time Hypoglycemia Treatment is Administered    08/13/24 1219    --  Cymbalta 20 MG capsule  Every 12 Hours Scheduled,   Status:  Discontinued         08/13/24 0747    --  Lantus SoloStar 100 UNIT/ML injection pen         08/13/24 0800    --  pregabalin (Lyrica) 225 MG capsule  Every 24 Hours,   Status:  Discontinued         08/13/24 0800    --  spironolactone (ALDACTONE) 100 MG tablet  Daily,   Status:  Discontinued         08/13/24 0800    --  furosemide (Lasix) 40 MG tablet  Daily,   Status:  Discontinued         08/13/24 0800    --  pregabalin (LYRICA) 300 MG capsule  2 Times Daily         08/13/24 0806    --  vitamin B-12 (CYANOCOBALAMIN) 1000 MCG tablet  Daily,   Status:  Discontinued         08/13/24 0947    Pending  Inpatient Neurology Consult General  IN AM        Comments: Spoke with [email or web address]   Specialty:   Neurology  Provider:  Jeanie Hall MD    Pending    --  atorvastatin (LIPITOR) 20 MG tablet  Daily         24 0843    --  Cyanocobalamin 1000 MCG/ML kit  Every 30 Days         24 0843    --  vitamin D (ERGOCALCIFEROL) 1.25 MG (65003 UT) capsule capsule  Every 30 Days         24 0843                     Physician Progress Notes (last 48 hours)        Jesse Sellers MD at 24 0729              [facility] INPATIENT PROGRESS NOTE         [facility] INTENSIVE CARE    2024      PATIENT IDENTIFICATION:  Name: Anthony Lerma ADMIT: 2024   : 1985  PCP: Provider, No Known    MRN: [id number] LOS: 1 days   AGE/SEX: 39 y.o. male  ROOM: [facility]                     LOS 1    Reason for visit: Critical care management with alcohol withdrawal      SUBJECTIVE:      Awake and alert.  No new issues overnight.  Requiring restraints.  Sitter at bedside. I am seeing the patient for the first time today.  All patient problems are new to me.      Objective   OBJECTIVE:    Vital Sign Min/Max for last 24 hours  Temp  Min: 97.7 °F (36.5 °C)  Max: 98.3 °F (36.8 °C)   BP  Min: 117/102  Max: 161/101   Pulse  Min: 68  Max: 105   Resp  Min: 18  Max: 19   SpO2  Min: 89 %  Max: 98 %   No data recorded   Weight  Min: 96.8 kg (213 lb 6.5 oz)  Max: 96.8 kg (213 lb 6.5 oz)    Vitals:    24 0600 24 0615 24 0630 24 0700   BP: 137/89  129/79 134/85   BP Location:       Patient Position:       Pulse: 78 90 88 80   Resp:       Temp:       TempSrc:       SpO2: 94% 97% 94% 93%   Weight:       Height:                24  0226 24  0507   Weight: 96.6 kg (213 lb) 96.8 kg (213 lb 6.5 oz)       Body mass index is 30.62 kg/m².                          Body mass index is 30.62 kg/m².    Intake/Output Summary (Last 24 hours) at 2024 0839  Last data filed at 2024 0507  Gross per 24 hour   Intake 890 ml   Output 900 ml   Net -10 ml         Exam:  GEN:  No distress,  appears stated age  EYES:   PERRL, anicteric sclerae  ENT:    External ears/nose normal, OP clear  NECK:  No adenopathy, midline trachea  LUNGS: Normal chest on inspection, palpation and auscultation  CV:  Normal S1S2, without murmur  ABD:  Nontender, nondistended, no hepatosplenomegaly, +BS  EXT:  No edema.  No cyanosis or clubbing.  No mottling and normal cap refill.    Assessment     Scheduled meds:  [Held by provider] amitriptyline, 10 mg, Oral, Nightly  enoxaparin, 40 mg, Subcutaneous, Daily  folic acid, 1 mg, Oral, Daily  [Held by provider] furosemide, 20 mg, Oral, Daily  insulin glargine, 10 Units, Subcutaneous, Nightly  insulin regular, 2-9 Units, Subcutaneous, Q6H  levETIRAcetam, 500 mg, Intravenous, Q12H  [START ON 8/15/2024] levETIRAcetam, 250 mg, Oral, BID  levothyroxine, 112 mcg, Oral, Daily  multivitamin with minerals, 1 tablet, Oral, Daily  nicotine, 1 patch, Transdermal, Q24H  PHENobarbital, 65 mg, Intravenous, Once   Followed by  [START ON 8/15/2024] PHENobarbital, 32.4 mg, Oral, Once   Followed by  [START ON 8/15/2024] PHENobarbital, 32.4 mg, Oral, Once   Followed by  [START ON 8/16/2024] PHENobarbital, 32.4 mg, Oral, Once  pregabalin, 300 mg, Oral, BID  sodium chloride, 10 mL, Intravenous, Q12H  [Held by provider] spironolactone, 50 mg, Oral, Daily  thiamine (B-1) IV, 500 mg, Intravenous, Q8H   Followed by  [START ON 8/16/2024] thiamine (B-1) IV, 200 mg, Intravenous, Q8H   Followed by  [START ON 8/20/2024] thiamine, 100 mg, Oral, Daily      IV meds:                         Data Review:  Results from last 7 days   Lab Units 08/14/24  0311 08/13/24  1344 08/13/24  0307   SODIUM mmol/L 137 132* 134*   POTASSIUM mmol/L 3.9 4.4 3.4*   CHLORIDE mmol/L 102 99 94*   CO2 mmol/L 21.7* 23.4 22.0   BUN mg/dL 9 10 11   CREATININE mg/dL 0.66* 0.80 0.82   GLUCOSE mg/dL 115* 160* 215*   CALCIUM mg/dL 8.4* 8.7 9.3         Estimated Creatinine Clearance: 173.6 mL/min (A) (by C-G formula based on SCr of 0.66 mg/dL  (L)).  Results from last 7 days   Lab Units 08/14/24  0311 08/13/24  0307   WBC 10*3/mm3 5.17 6.11   HEMOGLOBIN g/dL 14.7 15.1   PLATELETS 10*3/mm3 60* 57*         Results from last 7 days   Lab Units 08/14/24  0311 08/13/24  0307   ALT (SGPT) U/L 44* 63*   AST (SGOT) U/L 54* 84*                 Glucose   Date/Time Value Ref Range Status   08/14/2024 0732 119 70 - 130 mg/dL Final   08/14/2024 0458 119 70 - 130 mg/dL Final   08/14/2024 0031 128 70 - 130 mg/dL Final   08/13/2024 2155 121 70 - 130 mg/dL Final   08/13/2024 1959 136 (H) 70 - 130 mg/dL Final   08/13/2024 1835 146 (H) 70 - 130 mg/dL Final   08/13/2024 1451 146 (H) 70 - 130 mg/dL Final         Imaging reviewed  CT head reviewed: No acute             Active Hospital Problems    Diagnosis  POA    **Alcohol withdrawal seizure [F10.939, R56.9]  Yes    ETOH abuse [F10.10]  Yes      Resolved Hospital Problems   No resolved problems to display.         ASSESSMENT:  Alcohol withdrawal  Treatments  Seizure associate with alcohol withdrawal  Alcohol abuse  Cirrhosis  Diabetes with hyperglycemia  Hyponatremia clinically significant  Thrombocytopenia      PLAN:  For seizure prophylaxis.  CIWA protocol and phenobarbital alcohol withdrawal.  Requiring restraints and sitter.  Control glucose.  Correct electrolytes as needed.  DVT prophylaxis.    Discussed with multidisciplinary ICU team on rounds this morning.      CCT: 35 min    Jesse Sellers MD  Pulmonary and Critical Care Medicine  [facility]  8/14/2024    08:39 EDT       Electronically signed by Jesse Sellers MD at 08/14/24 0841

## 2024-08-14 NOTE — PROGRESS NOTES
[facility] INPATIENT PROGRESS NOTE         [facility] INTENSIVE CARE    2024      PATIENT IDENTIFICATION:  Name: Anthony Lerma ADMIT: 2024   : 1985  PCP: Provider, No Known    MRN: [id number] LOS: 1 days   AGE/SEX: 39 y.o. male  ROOM: [facility]                     LOS 1    Reason for visit: Critical care management with alcohol withdrawal      SUBJECTIVE:      Awake and alert.  No new issues overnight.  Requiring restraints.  Sitter at bedside. I am seeing the patient for the first time today.  All patient problems are new to me.      Objective   OBJECTIVE:    Vital Sign Min/Max for last 24 hours  Temp  Min: 97.7 °F (36.5 °C)  Max: 98.3 °F (36.8 °C)   BP  Min: 117/102  Max: 161/101   Pulse  Min: 68  Max: 105   Resp  Min: 18  Max: 19   SpO2  Min: 89 %  Max: 98 %   No data recorded   Weight  Min: 96.8 kg (213 lb 6.5 oz)  Max: 96.8 kg (213 lb 6.5 oz)    Vitals:    24 0600 24 0615 24 0630 24 0700   BP: 137/89  129/79 134/85   BP Location:       Patient Position:       Pulse: 78 90 88 80   Resp:       Temp:       TempSrc:       SpO2: 94% 97% 94% 93%   Weight:       Height:                24  0226 24  0507   Weight: 96.6 kg (213 lb) 96.8 kg (213 lb 6.5 oz)       Body mass index is 30.62 kg/m².                          Body mass index is 30.62 kg/m².    Intake/Output Summary (Last 24 hours) at 2024 0839  Last data filed at 2024 0507  Gross per 24 hour   Intake 890 ml   Output 900 ml   Net -10 ml         Exam:  GEN:  No distress, appears stated age  EYES:   PERRL, anicteric sclerae  ENT:    External ears/nose normal, OP clear  NECK:  No adenopathy, midline trachea  LUNGS: Normal chest on inspection, palpation and auscultation  CV:  Normal S1S2, without murmur  ABD:  Nontender, nondistended, no hepatosplenomegaly, +BS  EXT:  No edema.  No cyanosis or clubbing.  No mottling and normal cap refill.    Assessment     Scheduled meds:  [Held by provider]  amitriptyline, 10 mg, Oral, Nightly  enoxaparin, 40 mg, Subcutaneous, Daily  folic acid, 1 mg, Oral, Daily  [Held by provider] furosemide, 20 mg, Oral, Daily  insulin glargine, 10 Units, Subcutaneous, Nightly  insulin regular, 2-9 Units, Subcutaneous, Q6H  levETIRAcetam, 500 mg, Intravenous, Q12H  [START ON 8/15/2024] levETIRAcetam, 250 mg, Oral, BID  levothyroxine, 112 mcg, Oral, Daily  multivitamin with minerals, 1 tablet, Oral, Daily  nicotine, 1 patch, Transdermal, Q24H  PHENobarbital, 65 mg, Intravenous, Once   Followed by  [START ON 8/15/2024] PHENobarbital, 32.4 mg, Oral, Once   Followed by  [START ON 8/15/2024] PHENobarbital, 32.4 mg, Oral, Once   Followed by  [START ON 8/16/2024] PHENobarbital, 32.4 mg, Oral, Once  pregabalin, 300 mg, Oral, BID  sodium chloride, 10 mL, Intravenous, Q12H  [Held by provider] spironolactone, 50 mg, Oral, Daily  thiamine (B-1) IV, 500 mg, Intravenous, Q8H   Followed by  [START ON 8/16/2024] thiamine (B-1) IV, 200 mg, Intravenous, Q8H   Followed by  [START ON 8/20/2024] thiamine, 100 mg, Oral, Daily      IV meds:                         Data Review:  Results from last 7 days   Lab Units 08/14/24  0311 08/13/24  1344 08/13/24  0307   SODIUM mmol/L 137 132* 134*   POTASSIUM mmol/L 3.9 4.4 3.4*   CHLORIDE mmol/L 102 99 94*   CO2 mmol/L 21.7* 23.4 22.0   BUN mg/dL 9 10 11   CREATININE mg/dL 0.66* 0.80 0.82   GLUCOSE mg/dL 115* 160* 215*   CALCIUM mg/dL 8.4* 8.7 9.3         Estimated Creatinine Clearance: 173.6 mL/min (A) (by C-G formula based on SCr of 0.66 mg/dL (L)).  Results from last 7 days   Lab Units 08/14/24  0311 08/13/24  0307   WBC 10*3/mm3 5.17 6.11   HEMOGLOBIN g/dL 14.7 15.1   PLATELETS 10*3/mm3 60* 57*         Results from last 7 days   Lab Units 08/14/24  0311 08/13/24  0307   ALT (SGPT) U/L 44* 63*   AST (SGOT) U/L 54* 84*                 Glucose   Date/Time Value Ref Range Status   08/14/2024 0732 119 70 - 130 mg/dL Final   08/14/2024 0458 119 70 - 130 mg/dL Final    08/14/2024 0031 128 70 - 130 mg/dL Final   08/13/2024 2155 121 70 - 130 mg/dL Final   08/13/2024 1959 136 (H) 70 - 130 mg/dL Final   08/13/2024 1835 146 (H) 70 - 130 mg/dL Final   08/13/2024 1451 146 (H) 70 - 130 mg/dL Final         Imaging reviewed  CT head reviewed: No acute              Active Hospital Problems    Diagnosis  POA    **Alcohol withdrawal seizure [F10.939, R56.9]  Yes    ETOH abuse [F10.10]  Yes      Resolved Hospital Problems   No resolved problems to display.         ASSESSMENT:  Alcohol withdrawal  Treatments  Seizure associate with alcohol withdrawal  Alcohol abuse  Cirrhosis  Diabetes with hyperglycemia  Hyponatremia clinically significant  Thrombocytopenia      PLAN:  For seizure prophylaxis.  CIWA protocol and phenobarbital alcohol withdrawal.  Requiring restraints and sitter.  Control glucose.  Correct electrolytes as needed.  DVT prophylaxis.    Discussed with multidisciplinary ICU team on rounds this morning.      CCT: 35 min    Jesse Sellers MD  Pulmonary and Critical Care Medicine  [facility] [place]  8/14/2024    08:39 EDT

## 2024-08-15 LAB
ALBUMIN SERPL-MCNC: 3.3 G/DL (ref 3.5–5.2)
ALBUMIN/GLOB SERPL: 1 G/DL
ALP SERPL-CCNC: 90 U/L (ref 39–117)
ALT SERPL W P-5'-P-CCNC: 40 U/L (ref 1–41)
ANION GAP SERPL CALCULATED.3IONS-SCNC: 15.2 MMOL/L (ref 5–15)
AST SERPL-CCNC: 56 U/L (ref 1–40)
BASOPHILS # BLD AUTO: 0.03 10*3/MM3 (ref 0–0.2)
BASOPHILS NFR BLD AUTO: 0.5 % (ref 0–1.5)
BILIRUB SERPL-MCNC: 1.1 MG/DL (ref 0–1.2)
BUN SERPL-MCNC: 12 MG/DL (ref 6–20)
BUN/CREAT SERPL: 14.6 (ref 7–25)
CALCIUM SPEC-SCNC: 8.6 MG/DL (ref 8.6–10.5)
CHLORIDE SERPL-SCNC: 102 MMOL/L (ref 98–107)
CO2 SERPL-SCNC: 16.8 MMOL/L (ref 22–29)
CREAT SERPL-MCNC: 0.82 MG/DL (ref 0.76–1.27)
DEPRECATED RDW RBC AUTO: 46.9 FL (ref 37–54)
EGFRCR SERPLBLD CKD-EPI 2021: 113.9 ML/MIN/1.73
EOSINOPHIL # BLD AUTO: 0.1 10*3/MM3 (ref 0–0.4)
EOSINOPHIL NFR BLD AUTO: 1.7 % (ref 0.3–6.2)
ERYTHROCYTE [DISTWIDTH] IN BLOOD BY AUTOMATED COUNT: 12.7 % (ref 12.3–15.4)
GLOBULIN UR ELPH-MCNC: 3.3 GM/DL
GLUCOSE BLDC GLUCOMTR-MCNC: 105 MG/DL (ref 70–130)
GLUCOSE BLDC GLUCOMTR-MCNC: 114 MG/DL (ref 70–130)
GLUCOSE BLDC GLUCOMTR-MCNC: 123 MG/DL (ref 70–130)
GLUCOSE BLDC GLUCOMTR-MCNC: 126 MG/DL (ref 70–130)
GLUCOSE BLDC GLUCOMTR-MCNC: 135 MG/DL (ref 70–130)
GLUCOSE BLDC GLUCOMTR-MCNC: 92 MG/DL (ref 70–130)
GLUCOSE SERPL-MCNC: 114 MG/DL (ref 65–99)
HCT VFR BLD AUTO: 42.8 % (ref 37.5–51)
HGB BLD-MCNC: 14.3 G/DL (ref 13–17.7)
IMM GRANULOCYTES # BLD AUTO: 0.06 10*3/MM3 (ref 0–0.05)
IMM GRANULOCYTES NFR BLD AUTO: 1 % (ref 0–0.5)
LYMPHOCYTES # BLD AUTO: 0.53 10*3/MM3 (ref 0.7–3.1)
LYMPHOCYTES NFR BLD AUTO: 9.2 % (ref 19.6–45.3)
MAGNESIUM SERPL-MCNC: 2.1 MG/DL (ref 1.6–2.6)
MCH RBC QN AUTO: 33.3 PG (ref 26.6–33)
MCHC RBC AUTO-ENTMCNC: 33.4 G/DL (ref 31.5–35.7)
MCV RBC AUTO: 99.5 FL (ref 79–97)
MONOCYTES # BLD AUTO: 0.7 10*3/MM3 (ref 0.1–0.9)
MONOCYTES NFR BLD AUTO: 12.2 % (ref 5–12)
NEUTROPHILS NFR BLD AUTO: 4.32 10*3/MM3 (ref 1.7–7)
NEUTROPHILS NFR BLD AUTO: 75.4 % (ref 42.7–76)
PHOSPHATE SERPL-MCNC: 2.1 MG/DL (ref 2.5–4.5)
PHOSPHATE SERPL-MCNC: 2.6 MG/DL (ref 2.5–4.5)
PLATELET # BLD AUTO: 75 10*3/MM3 (ref 140–450)
PMV BLD AUTO: 11.5 FL (ref 6–12)
POTASSIUM SERPL-SCNC: 4 MMOL/L (ref 3.5–5.2)
PROT SERPL-MCNC: 6.6 G/DL (ref 6–8.5)
RBC # BLD AUTO: 4.3 10*6/MM3 (ref 4.14–5.8)
SODIUM SERPL-SCNC: 134 MMOL/L (ref 136–145)
WBC NRBC COR # BLD AUTO: 5.74 10*3/MM3 (ref 3.4–10.8)

## 2024-08-15 PROCEDURE — 25010000002 THIAMINE HCL 200 MG/2ML SOLUTION 2 ML VIAL: Performed by: STUDENT IN AN ORGANIZED HEALTH CARE EDUCATION/TRAINING PROGRAM

## 2024-08-15 PROCEDURE — 84100 ASSAY OF PHOSPHORUS: CPT | Performed by: INTERNAL MEDICINE

## 2024-08-15 PROCEDURE — 63710000001 INSULIN GLARGINE PER 5 UNITS: Performed by: STUDENT IN AN ORGANIZED HEALTH CARE EDUCATION/TRAINING PROGRAM

## 2024-08-15 PROCEDURE — 82948 REAGENT STRIP/BLOOD GLUCOSE: CPT

## 2024-08-15 PROCEDURE — 80053 COMPREHEN METABOLIC PANEL: CPT | Performed by: INTERNAL MEDICINE

## 2024-08-15 PROCEDURE — 25010000002 PHENOBARBITAL PER 120 MG: Performed by: HOSPITALIST

## 2024-08-15 PROCEDURE — 85025 COMPLETE CBC W/AUTO DIFF WBC: CPT | Performed by: INTERNAL MEDICINE

## 2024-08-15 PROCEDURE — 83735 ASSAY OF MAGNESIUM: CPT | Performed by: INTERNAL MEDICINE

## 2024-08-15 PROCEDURE — 25010000002 ENOXAPARIN PER 10 MG: Performed by: STUDENT IN AN ORGANIZED HEALTH CARE EDUCATION/TRAINING PROGRAM

## 2024-08-15 PROCEDURE — 25010000002 LORAZEPAM PER 2 MG: Performed by: STUDENT IN AN ORGANIZED HEALTH CARE EDUCATION/TRAINING PROGRAM

## 2024-08-15 PROCEDURE — 25810000003 SODIUM CHLORIDE 0.9 % SOLUTION: Performed by: INTERNAL MEDICINE

## 2024-08-15 PROCEDURE — 25010000002 LEVETRIRACETAM PER 10 MG

## 2024-08-15 RX ORDER — PHENOBARBITAL SODIUM 65 MG/ML
32.4 INJECTION, SOLUTION INTRAMUSCULAR; INTRAVENOUS ONCE
Status: COMPLETED | OUTPATIENT
Start: 2024-08-15 | End: 2024-08-15

## 2024-08-15 RX ORDER — LEVETIRACETAM 500 MG/5ML
250 INJECTION, SOLUTION, CONCENTRATE INTRAVENOUS EVERY 12 HOURS SCHEDULED
Status: COMPLETED | OUTPATIENT
Start: 2024-08-15 | End: 2024-08-16

## 2024-08-15 RX ORDER — FENTANYL/ROPIVACAINE/NS/PF 2-625MCG/1
15 PLASTIC BAG, INJECTION (ML) EPIDURAL ONCE
Status: COMPLETED | OUTPATIENT
Start: 2024-08-15 | End: 2024-08-15

## 2024-08-15 RX ADMIN — LORAZEPAM 2 MG: 2 INJECTION INTRAMUSCULAR; INTRAVENOUS at 05:48

## 2024-08-15 RX ADMIN — LEVETIRACETAM 250 MG: 100 INJECTION INTRAVENOUS at 20:56

## 2024-08-15 RX ADMIN — LEVOTHYROXINE SODIUM 112 MCG: 112 TABLET ORAL at 08:02

## 2024-08-15 RX ADMIN — PREGABALIN 300 MG: 100 CAPSULE ORAL at 10:37

## 2024-08-15 RX ADMIN — THIAMINE HYDROCHLORIDE 500 MG: 100 INJECTION, SOLUTION INTRAMUSCULAR; INTRAVENOUS at 16:06

## 2024-08-15 RX ADMIN — LORAZEPAM 2 MG: 2 INJECTION INTRAMUSCULAR; INTRAVENOUS at 00:43

## 2024-08-15 RX ADMIN — LORAZEPAM 2 MG: 2 INJECTION INTRAMUSCULAR; INTRAVENOUS at 10:37

## 2024-08-15 RX ADMIN — THIAMINE HYDROCHLORIDE 500 MG: 100 INJECTION, SOLUTION INTRAMUSCULAR; INTRAVENOUS at 21:00

## 2024-08-15 RX ADMIN — THIAMINE HYDROCHLORIDE 500 MG: 100 INJECTION, SOLUTION INTRAMUSCULAR; INTRAVENOUS at 05:49

## 2024-08-15 RX ADMIN — LEVETIRACETAM 250 MG: 250 TABLET, FILM COATED ORAL at 08:02

## 2024-08-15 RX ADMIN — LORAZEPAM 2 MG: 2 INJECTION INTRAMUSCULAR; INTRAVENOUS at 02:38

## 2024-08-15 RX ADMIN — PHENOBARBITAL SODIUM 32.4 MG: 65 INJECTION, SOLUTION INTRAMUSCULAR; INTRAVENOUS at 06:27

## 2024-08-15 RX ADMIN — LORAZEPAM 1 MG: 2 INJECTION INTRAMUSCULAR; INTRAVENOUS at 20:11

## 2024-08-15 RX ADMIN — LORAZEPAM 2 MG: 2 INJECTION INTRAMUSCULAR; INTRAVENOUS at 00:07

## 2024-08-15 RX ADMIN — NICOTINE 1 PATCH: 21 PATCH, EXTENDED RELEASE TRANSDERMAL at 11:08

## 2024-08-15 RX ADMIN — LORAZEPAM 2 MG: 2 INJECTION INTRAMUSCULAR; INTRAVENOUS at 15:36

## 2024-08-15 RX ADMIN — POTASSIUM PHOSPHATE, MONOBASIC AND POTASSIUM PHOSPHATE, DIBASIC 15 MMOL: 224; 236 INJECTION, SOLUTION, CONCENTRATE INTRAVENOUS at 07:20

## 2024-08-15 RX ADMIN — LORAZEPAM 2 MG: 2 INJECTION INTRAMUSCULAR; INTRAVENOUS at 14:48

## 2024-08-15 RX ADMIN — PREGABALIN 300 MG: 100 CAPSULE ORAL at 20:16

## 2024-08-15 RX ADMIN — LORAZEPAM 2 MG: 2 INJECTION INTRAMUSCULAR; INTRAVENOUS at 01:14

## 2024-08-15 RX ADMIN — LORAZEPAM 2 MG: 2 INJECTION INTRAMUSCULAR; INTRAVENOUS at 00:28

## 2024-08-15 RX ADMIN — Medication 10 ML: at 20:12

## 2024-08-15 RX ADMIN — Medication 1 TABLET: at 08:02

## 2024-08-15 RX ADMIN — LORAZEPAM 2 MG: 2 INJECTION INTRAMUSCULAR; INTRAVENOUS at 02:18

## 2024-08-15 RX ADMIN — LORAZEPAM 2 MG: 2 INJECTION INTRAMUSCULAR; INTRAVENOUS at 01:29

## 2024-08-15 RX ADMIN — LORAZEPAM 2 MG: 2 INJECTION INTRAMUSCULAR; INTRAVENOUS at 01:54

## 2024-08-15 RX ADMIN — PHENOBARBITAL SODIUM 32.4 MG: 65 INJECTION INTRAMUSCULAR; INTRAVENOUS at 18:12

## 2024-08-15 RX ADMIN — INSULIN GLARGINE 10 UNITS: 100 INJECTION, SOLUTION SUBCUTANEOUS at 20:11

## 2024-08-15 RX ADMIN — FOLIC ACID 1 MG: 1 TABLET ORAL at 08:02

## 2024-08-15 RX ADMIN — ENOXAPARIN SODIUM 40 MG: 100 INJECTION SUBCUTANEOUS at 08:02

## 2024-08-15 NOTE — PLAN OF CARE
Goal Outcome Evaluation:            Patient continues to be confused, restless, and at times uncooperative.  Speech still garbled.  PRN ativan administered multiple times this shift.  Patient's wife update on patient's condition via telephone.

## 2024-08-15 NOTE — NURSING NOTE
Access center consult.    Patient resting quietly with eyes closed. Sitter at bedside. Chart reviewed. Per MD note patient very confused, wrist restraints on. Staff reports patient combative. Per overnight RN patient agitated and restless, ativan administered per CIWA protocol. CIWA this shift 7, 14. Access following.

## 2024-08-15 NOTE — PLAN OF CARE
Goal Outcome Evaluation:      Pt confused and agitated/restless overnight, Ativan given as needed per CIWA protocol. No seizure like activity. No complaints of pain

## 2024-08-15 NOTE — PROGRESS NOTES
LPC INPATIENT PROGRESS NOTE         [facility] INTENSIVE CARE    8/15/2024      PATIENT IDENTIFICATION:  Name: Anthony Lerma ADMIT: 2024   : 1985  PCP: Provider, No Known    MRN: [id number] LOS: 2 days   AGE/SEX: 39 y.o. male  ROOM: [facility]                     LOS 2    Reason for visit: Critical care management with alcohol withdrawal      SUBJECTIVE:      Still very confused and requiring restraints.  Sitter at bedside.  Discussed with family at bedside.      Objective   OBJECTIVE:    Vital Sign Min/Max for last 24 hours  Temp  Min: 97.5 °F (36.4 °C)  Max: 98.9 °F (37.2 °C)   BP  Min: 107/73  Max: 160/94   Pulse  Min: 71  Max: 125   Resp  Min: 18  Max: 19   SpO2  Min: 92 %  Max: 98 %   No data recorded   Weight  Min: 97.8 kg (215 lb 9.8 oz)  Max: 97.8 kg (215 lb 9.8 oz)    Vitals:    08/15/24 0500 08/15/24 0503 08/15/24 0600 08/15/24 0700   BP: 124/91 124/91 129/80 (!) 138/104   Pulse: 112 95 (!) 125 101   Resp:       Temp:       TempSrc:       SpO2: 94% 94% 98% 97%   Weight:  97.8 kg (215 lb 9.8 oz)     Height:                24  0507 08/15/24  0300 08/15/24  0503   Weight: 96.8 kg (213 lb 6.5 oz) 97.8 kg (215 lb 9.8 oz) 97.8 kg (215 lb 9.8 oz)       Body mass index is 30.94 kg/m².                          Body mass index is 30.94 kg/m².    Intake/Output Summary (Last 24 hours) at 8/15/2024 09  Last data filed at 2024 193  Gross per 24 hour   Intake --   Output 1000 ml   Net -1000 ml         Exam:  GEN:  No distress, appears stated age  EYES:   PERRL, anicteric sclerae  ENT:    External ears/nose normal, OP clear  NECK:  No adenopathy, midline trachea  LUNGS: Normal chest on inspection, palpation and auscultation  CV:  Normal S1S2, without murmur  ABD:  Nontender, nondistended, no hepatosplenomegaly, +BS  EXT:  No edema.  No cyanosis or clubbing.  No mottling and normal cap refill.    Assessment     Scheduled meds:  [Held by provider] amitriptyline, 10 mg, Oral,  Nightly  enoxaparin, 40 mg, Subcutaneous, Daily  folic acid, 1 mg, Oral, Daily  [Held by provider] furosemide, 20 mg, Oral, Daily  insulin glargine, 10 Units, Subcutaneous, Nightly  insulin regular, 2-9 Units, Subcutaneous, Q6H  levETIRAcetam, 250 mg, Oral, BID  levothyroxine, 112 mcg, Oral, Daily  multivitamin with minerals, 1 tablet, Oral, Daily  nicotine, 1 patch, Transdermal, Q24H  PHENobarbital, 32.4 mg, Intravenous, Once  potassium phosphate, 15 mmol, Intravenous, Once  pregabalin, 300 mg, Oral, BID  sodium chloride, 10 mL, Intravenous, Q12H  [Held by provider] spironolactone, 50 mg, Oral, Daily  thiamine (B-1) IV, 500 mg, Intravenous, Q8H   Followed by  [START ON 8/16/2024] thiamine (B-1) IV, 200 mg, Intravenous, Q8H   Followed by  [START ON 8/20/2024] thiamine, 100 mg, Oral, Daily      IV meds:                         Data Review:  Results from last 7 days   Lab Units 08/15/24  0351 08/14/24 0311 08/13/24  1344 08/13/24  0307   SODIUM mmol/L 134* 137 132* 134*   POTASSIUM mmol/L 4.0 3.9 4.4 3.4*   CHLORIDE mmol/L 102 102 99 94*   CO2 mmol/L 16.8* 21.7* 23.4 22.0   BUN mg/dL 12 9 10 11   CREATININE mg/dL 0.82 0.66* 0.80 0.82   GLUCOSE mg/dL 114* 115* 160* 215*   CALCIUM mg/dL 8.6 8.4* 8.7 9.3         Estimated Creatinine Clearance: 140.4 mL/min (by C-G formula based on SCr of 0.82 mg/dL).  Results from last 7 days   Lab Units 08/15/24  0351 08/14/24  0311 08/13/24  0307   WBC 10*3/mm3 5.74 5.17 6.11   HEMOGLOBIN g/dL 14.3 14.7 15.1   PLATELETS 10*3/mm3 75* 60* 57*         Results from last 7 days   Lab Units 08/15/24  0351 08/14/24  0311 08/13/24  0307   ALT (SGPT) U/L 40 44* 63*   AST (SGOT) U/L 56* 54* 84*                 Glucose   Date/Time Value Ref Range Status   08/15/2024 0609 105 70 - 130 mg/dL Final   08/14/2024 2358 92 70 - 130 mg/dL Final   08/14/2024 1914 89 70 - 130 mg/dL Final   08/14/2024 1648 94 70 - 130 mg/dL Final   08/14/2024 1246 110 70 - 130 mg/dL Final   08/14/2024 0732 119 70 - 130  mg/dL Final   08/14/2024 0458 119 70 - 130 mg/dL Final         Imaging reviewed  CT head reviewed: No acute              Active Hospital Problems    Diagnosis  POA    **Alcohol withdrawal seizure [F10.939, R56.9]  Yes    Type 2 diabetes mellitus [E11.9]  Yes    Essential hypertension [I10]  Yes    Alcoholic cirrhosis of liver with ascites [K70.31]  Yes    Hypothyroidism (acquired) [E03.9]  Yes    ETOH abuse [F10.10]  Yes      Resolved Hospital Problems   No resolved problems to display.         ASSESSMENT:  Alcohol withdrawal  Treatments  Seizure associate with alcohol withdrawal  Alcohol abuse  Cirrhosis  Diabetes with hyperglycemia  Hyponatremia clinically significant  Thrombocytopenia      PLAN:  Keppra for seizure prophylaxis.  CIWA protocol and phenobarbital alcohol withdrawal.  Requiring restraints and sitter.  Control glucose.  Correct electrolytes as needed.  DVT prophylaxis.    Discussed with multidisciplinary ICU team on rounds this morning.        Jesse Sellers MD  Pulmonary and Critical Care Medicine  [facility], [facility]  8/15/2024    09:21 EDT

## 2024-08-15 NOTE — PROGRESS NOTES
Name: Anthony Lerma ADMIT: 2024   : 1985  PCP: Provider, No Known    MRN: [id number] LOS: 2 days   AGE/SEX: 39 y.o. male  ROOM: [facility]     Subjective   Subjective   No acute events. No family at bedside.    Objective   Objective   Vital Signs  Temp:  [97.5 °F (36.4 °C)-98.9 °F (37.2 °C)] 98.5 °F (36.9 °C)  Heart Rate:  [] 107  Resp:  [18] 18  BP: (107-163)/() 145/92  SpO2:  [92 %-98 %] 96 %  on  Flow (L/min):  [2] 2;   Device (Oxygen Therapy): nasal cannula  Body mass index is 30.94 kg/m².  Physical Exam  Vitals and nursing note reviewed.   Constitutional:       General: He is not in acute distress.     Appearance: He is ill-appearing. He is not toxic-appearing or diaphoretic.   HENT:      Head: Normocephalic and atraumatic.      Nose: Nose normal.      Mouth/Throat:      Mouth: Mucous membranes are moist.      Pharynx: Oropharynx is clear.   Eyes:      Conjunctiva/sclera: Conjunctivae normal.      Pupils: Pupils are equal, round, and reactive to light.   Cardiovascular:      Rate and Rhythm: Normal rate and regular rhythm.      Pulses: Normal pulses.   Pulmonary:      Effort: Pulmonary effort is normal.      Breath sounds: Normal breath sounds.   Abdominal:      General: Bowel sounds are normal. There is no distension.      Palpations: Abdomen is soft.      Tenderness: There is no abdominal tenderness.   Musculoskeletal:         General: No swelling or tenderness.      Cervical back: Neck supple.   Skin:     General: Skin is warm and dry.      Capillary Refill: Capillary refill takes less than 2 seconds.   Neurological:      Mental Status: He is lethargic.       Results Review     I reviewed the patient's new clinical results.  Results from last 7 days   Lab Units 08/15/24  0351 24  0311 24  0307   WBC 10*3/mm3 5.74 5.17 6.11   HEMOGLOBIN g/dL 14.3 14.7 15.1   PLATELETS 10*3/mm3 75* 60* 57*     Results from last 7 days   Lab Units 08/15/24  0351 241  08/13/24  1344 08/13/24  0307   SODIUM mmol/L 134* 137 132* 134*   POTASSIUM mmol/L 4.0 3.9 4.4 3.4*   CHLORIDE mmol/L 102 102 99 94*   CO2 mmol/L 16.8* 21.7* 23.4 22.0   BUN mg/dL 12 9 10 11   CREATININE mg/dL 0.82 0.66* 0.80 0.82   GLUCOSE mg/dL 114* 115* 160* 215*   EGFR mL/min/1.73 113.9 121.6 114.7 113.9     Results from last 7 days   Lab Units 08/15/24  0351 08/14/24  0311 08/13/24  0307   ALBUMIN g/dL 3.3* 3.4* 3.7   BILIRUBIN mg/dL 1.1 1.1 1.2   ALK PHOS U/L 90 81 98   AST (SGOT) U/L 56* 54* 84*   ALT (SGPT) U/L 40 44* 63*     Results from last 7 days   Lab Units 08/15/24  1512 08/15/24  0351 08/14/24  0311 08/13/24  1344 08/13/24  0307   CALCIUM mg/dL  --  8.6 8.4* 8.7 9.3   ALBUMIN g/dL  --  3.3* 3.4*  --  3.7   MAGNESIUM mg/dL  --  2.1 2.0  --  1.2*   PHOSPHORUS mg/dL 2.6 2.1* 2.3*  --   --        Glucose   Date/Time Value Ref Range Status   08/15/2024 1214 135 (H) 70 - 130 mg/dL Final   08/15/2024 0609 105 70 - 130 mg/dL Final   08/14/2024 2358 92 70 - 130 mg/dL Final   08/14/2024 1914 89 70 - 130 mg/dL Final   08/14/2024 1648 94 70 - 130 mg/dL Final   08/14/2024 1246 110 70 - 130 mg/dL Final   08/14/2024 0732 119 70 - 130 mg/dL Final       No radiology results for the last day    I have personally reviewed all medications:  Scheduled Medications  [Held by provider] amitriptyline, 10 mg, Oral, Nightly  enoxaparin, 40 mg, Subcutaneous, Daily  folic acid, 1 mg, Oral, Daily  [Held by provider] furosemide, 20 mg, Oral, Daily  insulin glargine, 10 Units, Subcutaneous, Nightly  insulin regular, 2-9 Units, Subcutaneous, Q6H  levETIRAcetam, 250 mg, Oral, BID  levothyroxine, 112 mcg, Oral, Daily  multivitamin with minerals, 1 tablet, Oral, Daily  nicotine, 1 patch, Transdermal, Q24H  PHENobarbital, 32.4 mg, Intravenous, Once  pregabalin, 300 mg, Oral, BID  sodium chloride, 10 mL, Intravenous, Q12H  [Held by provider] spironolactone, 50 mg, Oral, Daily  thiamine (B-1) IV, 500 mg, Intravenous, Q8H   Followed  by  [START ON 8/16/2024] thiamine (B-1) IV, 200 mg, Intravenous, Q8H   Followed by  [START ON 8/20/2024] thiamine, 100 mg, Oral, Daily    Infusions   Diet  Diet: Liquid; Full Liquid; Fluid Consistency: Thin (IDDSI 0)    I have personally reviewed:  [x]  Laboratory   []  Microbiology   []  Radiology   [x]  EKG/Telemetry  []  Cardiology/Vascular   []  Pathology    []  Records    Assessment/Plan     Active Hospital Problems    Diagnosis  POA    **Alcohol withdrawal seizure [F10.939, R56.9]  Yes    Type 2 diabetes mellitus [E11.9]  Yes    Essential hypertension [I10]  Yes    Alcoholic cirrhosis of liver with ascites [K70.31]  Yes    Hypothyroidism (acquired) [E03.9]  Yes    ETOH abuse [F10.10]  Yes      Resolved Hospital Problems   No resolved problems to display.   Alcohol Dependence in Withdrawal with Seizure  - continue CIWA protocol with ativan and phenobarbital  - continue vitamin replacement   - follow electrolytes and replace as needed  - continue short term course of keppra  - appreciate neurology recs and critical care management from [facility]    Type 2 DM  - blood glucose acceptable  - continue lantus 10 units nightly  - cover with ssi/hypoglycemia protocol    HTN  - BP acceptable  - continue current regimen    Alcoholic Cirrhosis  - appears reasonably well compensated, has secondary thrombocytopenia  - hold lasix and aldactone for now    Hypothyroidism  - continue levothyroxine    Lovenox 40 mg SC daily for DVT prophylaxis.  Full code.  Discussed with patient and nursing staff.  Anticipate discharge home timing yet to be determined.  Expected Discharge Date: 8/18/2024; Expected Discharge Time: 12:00 PM      Álvaro Resendez MD  [facility]  08/15/24  16:28 EDT    Portions of this text have been copied and I have reviewed them. They are accurate as of 8/15/2024

## 2024-08-16 LAB
ALBUMIN SERPL-MCNC: 3.4 G/DL (ref 3.5–5.2)
ALBUMIN/GLOB SERPL: 1 G/DL
ALP SERPL-CCNC: 97 U/L (ref 39–117)
ALT SERPL W P-5'-P-CCNC: 37 U/L (ref 1–41)
ANION GAP SERPL CALCULATED.3IONS-SCNC: 14.7 MMOL/L (ref 5–15)
AST SERPL-CCNC: 55 U/L (ref 1–40)
ATMOSPHERIC PRESS: 745.7 MMHG
BACTERIA UR QL AUTO: ABNORMAL /HPF
BASE EXCESS BLDV CALC-SCNC: -6.3 MMOL/L (ref -2–2)
BASOPHILS # BLD AUTO: 0.03 10*3/MM3 (ref 0–0.2)
BASOPHILS NFR BLD AUTO: 0.4 % (ref 0–1.5)
BDY SITE: ABNORMAL
BILIRUB SERPL-MCNC: 0.9 MG/DL (ref 0–1.2)
BILIRUB UR QL STRIP: NEGATIVE
BUN SERPL-MCNC: 14 MG/DL (ref 6–20)
BUN/CREAT SERPL: 15.7 (ref 7–25)
CALCIUM SPEC-SCNC: 8.2 MG/DL (ref 8.6–10.5)
CHLORIDE SERPL-SCNC: 100 MMOL/L (ref 98–107)
CLARITY UR: CLEAR
CO2 BLDA-SCNC: 18.7 MMOL/L (ref 23–27)
CO2 SERPL-SCNC: 16.3 MMOL/L (ref 22–29)
COLOR UR: ABNORMAL
CREAT SERPL-MCNC: 0.89 MG/DL (ref 0.76–1.27)
CREAT UR-MCNC: 233.2 MG/DL
DEPRECATED RDW RBC AUTO: 44.9 FL (ref 37–54)
DEVICE COMMENT: ABNORMAL
EGFRCR SERPLBLD CKD-EPI 2021: 111.1 ML/MIN/1.73
EOSINOPHIL # BLD AUTO: 0.1 10*3/MM3 (ref 0–0.4)
EOSINOPHIL NFR BLD AUTO: 1.4 % (ref 0.3–6.2)
ERYTHROCYTE [DISTWIDTH] IN BLOOD BY AUTOMATED COUNT: 12.4 % (ref 12.3–15.4)
GAS FLOW AIRWAY: 2 LPM
GLOBULIN UR ELPH-MCNC: 3.3 GM/DL
GLUCOSE BLDC GLUCOMTR-MCNC: 102 MG/DL (ref 70–130)
GLUCOSE BLDC GLUCOMTR-MCNC: 123 MG/DL (ref 70–130)
GLUCOSE BLDC GLUCOMTR-MCNC: 128 MG/DL (ref 70–130)
GLUCOSE BLDC GLUCOMTR-MCNC: 130 MG/DL (ref 70–130)
GLUCOSE SERPL-MCNC: 131 MG/DL (ref 65–99)
GLUCOSE UR STRIP-MCNC: NEGATIVE MG/DL
HCO3 BLDV-SCNC: 17.8 MMOL/L (ref 22–28)
HCT VFR BLD AUTO: 41.9 % (ref 37.5–51)
HGB BLD-MCNC: 14.2 G/DL (ref 13–17.7)
HGB UR QL STRIP.AUTO: ABNORMAL
HYALINE CASTS UR QL AUTO: ABNORMAL /LPF
IMM GRANULOCYTES # BLD AUTO: 0.08 10*3/MM3 (ref 0–0.05)
IMM GRANULOCYTES NFR BLD AUTO: 1.1 % (ref 0–0.5)
KETONES UR QL STRIP: ABNORMAL
LEUKOCYTE ESTERASE UR QL STRIP.AUTO: ABNORMAL
LYMPHOCYTES # BLD AUTO: 0.7 10*3/MM3 (ref 0.7–3.1)
LYMPHOCYTES NFR BLD AUTO: 10 % (ref 19.6–45.3)
MAGNESIUM SERPL-MCNC: 2.1 MG/DL (ref 1.6–2.6)
MCH RBC QN AUTO: 33.4 PG (ref 26.6–33)
MCHC RBC AUTO-ENTMCNC: 33.9 G/DL (ref 31.5–35.7)
MCV RBC AUTO: 98.6 FL (ref 79–97)
MODALITY: ABNORMAL
MONOCYTES # BLD AUTO: 0.91 10*3/MM3 (ref 0.1–0.9)
MONOCYTES NFR BLD AUTO: 13.1 % (ref 5–12)
NEUTROPHILS NFR BLD AUTO: 5.15 10*3/MM3 (ref 1.7–7)
NEUTROPHILS NFR BLD AUTO: 74 % (ref 42.7–76)
NITRITE UR QL STRIP: NEGATIVE
OSMOLALITY SERPL: 298 MOSM/KG (ref 275–300)
OSMOLALITY UR: 1025 MOSM/KG
PCO2 BLDV: 31.2 MM HG (ref 41–51)
PH BLDV: 7.36 PH UNITS (ref 7.31–7.41)
PH UR STRIP.AUTO: 6 [PH] (ref 5–8)
PHOSPHATE SERPL-MCNC: 2.6 MG/DL (ref 2.5–4.5)
PLATELET # BLD AUTO: 108 10*3/MM3 (ref 140–450)
PMV BLD AUTO: 10.7 FL (ref 6–12)
PO2 BLDV: 66.6 MM HG (ref 35–45)
POTASSIUM SERPL-SCNC: 3.9 MMOL/L (ref 3.5–5.2)
PROT ?TM UR-MCNC: 425.7 MG/DL
PROT SERPL-MCNC: 6.7 G/DL (ref 6–8.5)
PROT UR QL STRIP: ABNORMAL
PROT/CREAT UR: 1825.5 MG/G CREA (ref 0–200)
RBC # BLD AUTO: 4.25 10*6/MM3 (ref 4.14–5.8)
RBC # UR STRIP: ABNORMAL /HPF
REF LAB TEST METHOD: ABNORMAL
SAO2 % BLDCOV: 92.5 % (ref 45–75)
SODIUM SERPL-SCNC: 131 MMOL/L (ref 136–145)
SODIUM UR-SCNC: 74 MMOL/L
SP GR UR STRIP: >1.03 (ref 1–1.03)
SQUAMOUS #/AREA URNS HPF: ABNORMAL /HPF
TOTAL RATE: 28 BREATHS/MINUTE
TSH SERPL DL<=0.05 MIU/L-ACNC: 2.06 UIU/ML (ref 0.27–4.2)
URATE SERPL-MCNC: 6.7 MG/DL (ref 3.4–7)
UROBILINOGEN UR QL STRIP: ABNORMAL
WBC # UR STRIP: ABNORMAL /HPF
WBC NRBC COR # BLD AUTO: 6.97 10*3/MM3 (ref 3.4–10.8)

## 2024-08-16 PROCEDURE — 85025 COMPLETE CBC W/AUTO DIFF WBC: CPT | Performed by: INTERNAL MEDICINE

## 2024-08-16 PROCEDURE — 25010000002 LEVETRIRACETAM PER 10 MG

## 2024-08-16 PROCEDURE — 81001 URINALYSIS AUTO W/SCOPE: CPT

## 2024-08-16 PROCEDURE — 25010000002 LORAZEPAM PER 2 MG: Performed by: STUDENT IN AN ORGANIZED HEALTH CARE EDUCATION/TRAINING PROGRAM

## 2024-08-16 PROCEDURE — 25010000002 THIAMINE PER 100 MG: Performed by: STUDENT IN AN ORGANIZED HEALTH CARE EDUCATION/TRAINING PROGRAM

## 2024-08-16 PROCEDURE — 83935 ASSAY OF URINE OSMOLALITY: CPT

## 2024-08-16 PROCEDURE — 80053 COMPREHEN METABOLIC PANEL: CPT | Performed by: INTERNAL MEDICINE

## 2024-08-16 PROCEDURE — 25010000002 ENOXAPARIN PER 10 MG: Performed by: STUDENT IN AN ORGANIZED HEALTH CARE EDUCATION/TRAINING PROGRAM

## 2024-08-16 PROCEDURE — 84550 ASSAY OF BLOOD/URIC ACID: CPT

## 2024-08-16 PROCEDURE — 82948 REAGENT STRIP/BLOOD GLUCOSE: CPT

## 2024-08-16 PROCEDURE — 82570 ASSAY OF URINE CREATININE: CPT

## 2024-08-16 PROCEDURE — 83735 ASSAY OF MAGNESIUM: CPT | Performed by: INTERNAL MEDICINE

## 2024-08-16 PROCEDURE — 84156 ASSAY OF PROTEIN URINE: CPT

## 2024-08-16 PROCEDURE — 84100 ASSAY OF PHOSPHORUS: CPT | Performed by: INTERNAL MEDICINE

## 2024-08-16 PROCEDURE — 82803 BLOOD GASES ANY COMBINATION: CPT

## 2024-08-16 PROCEDURE — 83930 ASSAY OF BLOOD OSMOLALITY: CPT

## 2024-08-16 PROCEDURE — 84300 ASSAY OF URINE SODIUM: CPT

## 2024-08-16 PROCEDURE — 84443 ASSAY THYROID STIM HORMONE: CPT

## 2024-08-16 RX ADMIN — NICOTINE 1 PATCH: 21 PATCH, EXTENDED RELEASE TRANSDERMAL at 18:05

## 2024-08-16 RX ADMIN — LORAZEPAM 2 MG: 2 INJECTION INTRAMUSCULAR; INTRAVENOUS at 01:55

## 2024-08-16 RX ADMIN — LORAZEPAM 2 MG: 2 INJECTION INTRAMUSCULAR; INTRAVENOUS at 12:34

## 2024-08-16 RX ADMIN — LORAZEPAM 2 MG: 2 INJECTION INTRAMUSCULAR; INTRAVENOUS at 01:32

## 2024-08-16 RX ADMIN — PREGABALIN 300 MG: 100 CAPSULE ORAL at 19:50

## 2024-08-16 RX ADMIN — METOPROLOL TARTRATE 5 MG: 1 INJECTION, SOLUTION INTRAVENOUS at 01:19

## 2024-08-16 RX ADMIN — LORAZEPAM 2 MG: 2 INJECTION INTRAMUSCULAR; INTRAVENOUS at 20:39

## 2024-08-16 RX ADMIN — ENOXAPARIN SODIUM 40 MG: 100 INJECTION SUBCUTANEOUS at 10:14

## 2024-08-16 RX ADMIN — THIAMINE HYDROCHLORIDE 200 MG: 100 INJECTION, SOLUTION INTRAMUSCULAR; INTRAVENOUS at 19:21

## 2024-08-16 RX ADMIN — LORAZEPAM 2 MG: 2 INJECTION INTRAMUSCULAR; INTRAVENOUS at 01:07

## 2024-08-16 RX ADMIN — Medication 10 ML: at 10:14

## 2024-08-16 RX ADMIN — Medication 10 ML: at 20:22

## 2024-08-16 RX ADMIN — LORAZEPAM 2 MG: 2 INJECTION INTRAMUSCULAR; INTRAVENOUS at 19:50

## 2024-08-16 RX ADMIN — LEVETIRACETAM 250 MG: 100 INJECTION INTRAVENOUS at 10:14

## 2024-08-16 RX ADMIN — THIAMINE HYDROCHLORIDE 200 MG: 100 INJECTION, SOLUTION INTRAMUSCULAR; INTRAVENOUS at 06:23

## 2024-08-16 RX ADMIN — LORAZEPAM 2 MG: 2 INJECTION INTRAMUSCULAR; INTRAVENOUS at 00:30

## 2024-08-16 RX ADMIN — LORAZEPAM 2 MG: 2 INJECTION INTRAMUSCULAR; INTRAVENOUS at 10:54

## 2024-08-16 NOTE — PROGRESS NOTES
Name: Anthony Lerma ADMIT: 2024   : 1985  PCP: Provider, No Known    MRN: [id number] LOS: 3 days   AGE/SEX: 39 y.o. male  ROOM: [facility]     Subjective   Subjective   No acute events. No family at bedside.    Objective   Objective   Vital Signs  Temp:  [98.2 °F (36.8 °C)-98.9 °F (37.2 °C)] 98.2 °F (36.8 °C)  Heart Rate:  [] 125  Resp:  [18] 18  BP: (104-150)/(46-95) 147/78  SpO2:  [92 %-98 %] 97 %  on  Flow (L/min):  [2-4] 4;   Device (Oxygen Therapy): nasal cannula  Body mass index is 30.94 kg/m².  Physical Exam  Vitals and nursing note reviewed.   Constitutional:       General: He is not in acute distress.     Appearance: He is ill-appearing. He is not toxic-appearing or diaphoretic.   HENT:      Head: Normocephalic and atraumatic.      Nose: Nose normal.      Mouth/Throat:      Mouth: Mucous membranes are moist.      Pharynx: Oropharynx is clear.   Eyes:      Conjunctiva/sclera: Conjunctivae normal.      Pupils: Pupils are equal, round, and reactive to light.   Cardiovascular:      Rate and Rhythm: Normal rate and regular rhythm.      Pulses: Normal pulses.   Pulmonary:      Effort: Pulmonary effort is normal.      Breath sounds: Normal breath sounds.   Abdominal:      General: Bowel sounds are normal. There is no distension.      Palpations: Abdomen is soft.      Tenderness: There is no abdominal tenderness.   Musculoskeletal:         General: No swelling or tenderness.      Cervical back: Neck supple.   Skin:     General: Skin is warm and dry.      Capillary Refill: Capillary refill takes less than 2 seconds.   Neurological:      Mental Status: He is lethargic.       Results Review     I reviewed the patient's new clinical results.  Results from last 7 days   Lab Units 24  0320 08/15/24  0351 24  0311 24  0307   WBC 10*3/mm3 6.97 5.74 5.17 6.11   HEMOGLOBIN g/dL 14.2 14.3 14.7 15.1   PLATELETS 10*3/mm3 108* 75* 60* 57*     Results from last 7 days   Lab Units  08/16/24  0320 08/15/24  0351 08/14/24  0311 08/13/24  1344   SODIUM mmol/L 131* 134* 137 132*   POTASSIUM mmol/L 3.9 4.0 3.9 4.4   CHLORIDE mmol/L 100 102 102 99   CO2 mmol/L 16.3* 16.8* 21.7* 23.4   BUN mg/dL 14 12 9 10   CREATININE mg/dL 0.89 0.82 0.66* 0.80   GLUCOSE mg/dL 131* 114* 115* 160*   EGFR mL/min/1.73 111.1 113.9 121.6 114.7     Results from last 7 days   Lab Units 08/16/24  0320 08/15/24  0351 08/14/24  0311 08/13/24  0307   ALBUMIN g/dL 3.4* 3.3* 3.4* 3.7   BILIRUBIN mg/dL 0.9 1.1 1.1 1.2   ALK PHOS U/L 97 90 81 98   AST (SGOT) U/L 55* 56* 54* 84*   ALT (SGPT) U/L 37 40 44* 63*     Results from last 7 days   Lab Units 08/16/24  0320 08/15/24  1512 08/15/24  0351 08/14/24  0311 08/13/24  1344 08/13/24  0307   CALCIUM mg/dL 8.2*  --  8.6 8.4* 8.7 9.3   ALBUMIN g/dL 3.4*  --  3.3* 3.4*  --  3.7   MAGNESIUM mg/dL 2.1  --  2.1 2.0  --  1.2*   PHOSPHORUS mg/dL 2.6 2.6 2.1* 2.3*  --   --        Glucose   Date/Time Value Ref Range Status   08/16/2024 1351 128 70 - 130 mg/dL Final   08/16/2024 0556 102 70 - 130 mg/dL Final   08/15/2024 2318 126 70 - 130 mg/dL Final   08/15/2024 1947 114 70 - 130 mg/dL Final   08/15/2024 1830 123 70 - 130 mg/dL Final   08/15/2024 1214 135 (H) 70 - 130 mg/dL Final   08/15/2024 0609 105 70 - 130 mg/dL Final       No radiology results for the last day    I have personally reviewed all medications:  Scheduled Medications  [Held by provider] amitriptyline, 10 mg, Oral, Nightly  enoxaparin, 40 mg, Subcutaneous, Daily  folic acid, 1 mg, Oral, Daily  [Held by provider] furosemide, 20 mg, Oral, Daily  insulin glargine, 10 Units, Subcutaneous, Nightly  insulin regular, 2-9 Units, Subcutaneous, Q6H  levothyroxine, 112 mcg, Oral, Daily  multivitamin with minerals, 1 tablet, Oral, Daily  nicotine, 1 patch, Transdermal, Q24H  pregabalin, 300 mg, Oral, BID  sodium chloride, 10 mL, Intravenous, Q12H  [Held by provider] spironolactone, 50 mg, Oral, Daily  thiamine (B-1) IV, 200 mg,  Intravenous, Q8H   Followed by  [START ON 8/20/2024] thiamine, 100 mg, Oral, Daily    Infusions   Diet  Diet: Liquid, Fluid Restriction (240 mL/tray); Full Liquid; 1500 mL/day; Fluid Consistency: Thin (IDDSI 0)    I have personally reviewed:  [x]  Laboratory   []  Microbiology   []  Radiology   [x]  EKG/Telemetry  []  Cardiology/Vascular   []  Pathology    []  Records    Assessment/Plan     Active Hospital Problems    Diagnosis  POA    **Alcohol withdrawal seizure [F10.939, R56.9]  Yes    Type 2 diabetes mellitus [E11.9]  Yes    Essential hypertension [I10]  Yes    Alcoholic cirrhosis of liver with ascites [K70.31]  Yes    Hypothyroidism (acquired) [E03.9]  Yes    ETOH abuse [F10.10]  Yes      Resolved Hospital Problems   No resolved problems to display.   Alcohol Dependence in Withdrawal with Seizure  - continue CIWA protocol with ativan, finished phenobarbital  - continue vitamin replacement   - follow electrolytes and replace as needed  - finished short term course of keppra as recommended by neurology  - appreciate neurology recs and critical care management from [facility]    Type 2 DM  - blood glucose acceptable  - continue lantus 10 units nightly  - cover with ssi/hypoglycemia protocol    HTN  - BP acceptable  - continue current regimen    Alcoholic Cirrhosis  - appears reasonably well compensated, has secondary thrombocytopenia  - holding lasix and aldactone for now    Hypothyroidism  - continue levothyroxine    Metabolic Acidosis  - saline stopped as above  - will monitor as oral intake increases    Lovenox 40 mg SC daily for DVT prophylaxis.  Full code.  Discussed with patient and nursing staff.  Anticipate discharge home timing yet to be determined.  Expected Discharge Date: 8/18/2024; Expected Discharge Time: 12:00 PM      Álvaro Resendez MD  [facility]  08/16/24  16:04 EDT    Portions of this text have been copied and I have reviewed them. They are accurate as of 8/16/2024

## 2024-08-16 NOTE — PROGRESS NOTES
LPC INPATIENT PROGRESS NOTE         [facility] INTENSIVE CARE    2024      PATIENT IDENTIFICATION:  Name: Anthony Lerma ADMIT: 2024   : 1985  PCP: Provider, No Known    MRN: [id number] LOS: 3 days   AGE/SEX: 39 y.o. male  ROOM: [facility]                     LOS 3    Reason for visit: Critical care management with alcohol withdrawal      SUBJECTIVE:      Still with some agitation and confusion but stabilized.  Requires restraints.  Not requiring any drips could downgrade to telemetry floor with sitter.      Objective   OBJECTIVE:    Vital Sign Min/Max for last 24 hours  Temp  Min: 98.4 °F (36.9 °C)  Max: 98.9 °F (37.2 °C)   BP  Min: 104/63  Max: 163/72   Pulse  Min: 63  Max: 128   Resp  Min: 18  Max: 18   SpO2  Min: 92 %  Max: 98 %   No data recorded   No data recorded    Vitals:    24 0400 24 0500 24 0700 24 0730   BP: 107/73 114/62 118/46 128/95   Pulse: 92 98 120 (!) 123   Resp:       Temp:   98.9 °F (37.2 °C)    TempSrc:   Oral    SpO2: 95% 94% 96% 94%   Weight:       Height:                24  0507 08/15/24  0300 08/15/24  0503   Weight: 96.8 kg (213 lb 6.5 oz) 97.8 kg (215 lb 9.8 oz) 97.8 kg (215 lb 9.8 oz)       Body mass index is 30.94 kg/m².                          Body mass index is 30.94 kg/m².    Intake/Output Summary (Last 24 hours) at 2024 0854  Last data filed at 8/15/2024 2334  Gross per 24 hour   Intake 810.75 ml   Output 0 ml   Net 810.75 ml         Exam:  GEN:  No distress, appears stated age  EYES:   PERRL, anicteric sclerae  ENT:    External ears/nose normal, OP clear  NECK:  No adenopathy, midline trachea  LUNGS: Normal chest on inspection, palpation and auscultation  CV:  Normal S1S2, without murmur  ABD:  Nontender, nondistended, no hepatosplenomegaly, +BS  EXT:  No edema.  No cyanosis or clubbing.  No mottling and normal cap refill.    Assessment     Scheduled meds:  [Held by provider] amitriptyline, 10 mg, Oral,  Nightly  enoxaparin, 40 mg, Subcutaneous, Daily  folic acid, 1 mg, Oral, Daily  [Held by provider] furosemide, 20 mg, Oral, Daily  insulin glargine, 10 Units, Subcutaneous, Nightly  insulin regular, 2-9 Units, Subcutaneous, Q6H  levETIRAcetam, 250 mg, Intravenous, Q12H  levothyroxine, 112 mcg, Oral, Daily  multivitamin with minerals, 1 tablet, Oral, Daily  nicotine, 1 patch, Transdermal, Q24H  pregabalin, 300 mg, Oral, BID  sodium chloride, 10 mL, Intravenous, Q12H  [Held by provider] spironolactone, 50 mg, Oral, Daily  thiamine (B-1) IV, 200 mg, Intravenous, Q8H   Followed by  [START ON 8/20/2024] thiamine, 100 mg, Oral, Daily      IV meds:                         Data Review:  Results from last 7 days   Lab Units 08/16/24  0320 08/15/24  0351 08/14/24  0311 08/13/24  1344 08/13/24  0307   SODIUM mmol/L 131* 134* 137 132* 134*   POTASSIUM mmol/L 3.9 4.0 3.9 4.4 3.4*   CHLORIDE mmol/L 100 102 102 99 94*   CO2 mmol/L 16.3* 16.8* 21.7* 23.4 22.0   BUN mg/dL 14 12 9 10 11   CREATININE mg/dL 0.89 0.82 0.66* 0.80 0.82   GLUCOSE mg/dL 131* 114* 115* 160* 215*   CALCIUM mg/dL 8.2* 8.6 8.4* 8.7 9.3         Estimated Creatinine Clearance: 129.4 mL/min (by C-G formula based on SCr of 0.89 mg/dL).  Results from last 7 days   Lab Units 08/16/24  0320 08/15/24  0351 08/14/24  0311 08/13/24  0307   WBC 10*3/mm3 6.97 5.74 5.17 6.11   HEMOGLOBIN g/dL 14.2 14.3 14.7 15.1   PLATELETS 10*3/mm3 108* 75* 60* 57*         Results from last 7 days   Lab Units 08/16/24  0320 08/15/24  0351 08/14/24  0311 08/13/24  0307   ALT (SGPT) U/L 37 40 44* 63*   AST (SGOT) U/L 55* 56* 54* 84*                 Glucose   Date/Time Value Ref Range Status   08/16/2024 0556 102 70 - 130 mg/dL Final   08/15/2024 2318 126 70 - 130 mg/dL Final   08/15/2024 1947 114 70 - 130 mg/dL Final   08/15/2024 1830 123 70 - 130 mg/dL Final   08/15/2024 1214 135 (H) 70 - 130 mg/dL Final   08/15/2024 0609 105 70 - 130 mg/dL Final   08/14/2024 2358 92 70 - 130 mg/dL Final          Imaging reviewed  CT head reviewed: No acute              Active Hospital Problems    Diagnosis  POA    **Alcohol withdrawal seizure [F10.939, R56.9]  Yes    Type 2 diabetes mellitus [E11.9]  Yes    Essential hypertension [I10]  Yes    Alcoholic cirrhosis of liver with ascites [K70.31]  Yes    Hypothyroidism (acquired) [E03.9]  Yes    ETOH abuse [F10.10]  Yes      Resolved Hospital Problems   No resolved problems to display.         ASSESSMENT:  Alcohol withdrawal  Treatments  Seizure associate with alcohol withdrawal  Alcohol abuse  Cirrhosis  Diabetes with hyperglycemia  Hyponatremia clinically significant  Thrombocytopenia      PLAN:  Keppra for seizure prophylaxis.  [facility] protocol for alcohol withdrawal.  Requiring restraints and sitter.  Agitation and confusion improving.  Can downgrade to telemetry floor.  Control glucose.  Correct electrolytes as needed.  DVT prophylaxis.    Discussed with multidisciplinary ICU team on rounds this morning.        Jesse Sellers MD  Pulmonary and Critical Care Medicine  [facility], [facility]  8/16/2024    08:54 EDT

## 2024-08-16 NOTE — PROGRESS NOTES
Patient is followed by [facility]  We will transfer consult to them for continued of care.      Thank you for your consultation

## 2024-08-16 NOTE — CONSULTS
INITIAL CONSULT NOTE      Patient Name: Anthony Lerma  : 1985  MRN: [id number]  Primary Care Physician: Rayo, Laisha Known  Date of admission: 2024    Patient Care Team:  Provider, Laisha Known as PCP - General        Reason for Consult:       Established patient, prior LOVELY, chronic hyponatremia    Subjective   History of Present Illness:   Chief Complaint:   Chief Complaint   Patient presents with    Seizures     HISTORY:  Anthony Lerma is a 39 y.o. male known to our group outpatient who follows with us in the office  for previous LOVELY, hyponatremia. Other medical history significant for alcohol abuse, liver cirrhosis, DM2, hypothyroidism.     Patient presented to [facility] after a seizure, apparently has been drinking quite often, went to the [facility] for detox where he had a seizure, sent to ED. He was given ativan and phenobarbital for agitation, withdrawal. CT head and cervical spine WO without acute abnormality. Labs significant for sodium 132, platelets 60, UDS negative, ethanol level <10, renal function at baseline. Nephrology consulted for hyponatremia.    Review of systems:  Constitutional: weakness.  HEENT:  No headache, otalgia, itchy eyes, nasal discharge or sore throat.  Cardiac:  No chest pain, dyspnea, orthopnea or PND.  Chest:              No cough, phlegm or wheezing.  Abdomen:  No abdominal pain, nausea or vomiting.  Neuro:  No focal weakness, abnormal movements orseizure like activity.  :   No hematuria, no pyuria, no dysuria, no flank pain.  ROS was otherwise negative except as mentioned in the [place].       Personal History:     Past Medical History: History reviewed. No pertinent past medical history.    Surgical History:    History reviewed. No pertinent surgical history.    Family History: family history is not on file. Otherwise pertinent FHx was reviewed and unremarkable.     Social History:      Medications:  Prior to Admission medications    Medication Sig Start Date End Date  Taking? Authorizing Provider   atorvastatin (LIPITOR) 20 MG tablet Take 1 tablet by mouth Daily.   Yes Lionel Cade MD   Continuous Glucose Sensor (Dexcom G7 Sensor) misc APPLY ONE SENSOR TOPICALLY EVERY 10 DAYS 7/17/24  Yes Lionel Cade MD   Cyanocobalamin 1000 MCG/ML kit Inject 1 mL as directed Every 30 (Thirty) Days.   Yes Lionel Cade MD   furosemide (LASIX) 20 MG tablet Take 1 tablet by mouth Daily. 6/21/24  Yes Lionel Cade MD   insulin aspart (NovoLOG FlexPen) 100 UNIT/ML solution pen-injector sc pen 5 UNITS BEFORE SUPPER (IF B/G GREATER THAN 200 USE 5 UNITS FOR EACH 50MG ABOVE 200, MAX 50 UNITS DAILY. 7/31/24  Yes Lionel Cade MD   Januvia 50 MG tablet Take 1 tablet by mouth Daily. 8/5/24  Yes Lionel Cade MD   Lanvladimir SoloStar 100 UNIT/ML injection pen INJECT 8 TO 12 UNITS SUBCUTANEOUSLY ONCE DAILY   Yes Lionel Cade MD   levothyroxine (SYNTHROID, LEVOTHROID) 112 MCG tablet Take 1 tablet by mouth Daily. 6/19/24  Yes Lionel Cade MD   nortriptyline (PAMELOR) 10 MG capsule Take 1 capsule by mouth Every Night. 7/30/24  Yes Lionel Cade MD   pregabalin (LYRICA) 300 MG capsule Take 1 capsule by mouth 2 (Two) Times a Day.   Yes Lionel Cade MD   spironolactone (ALDACTONE) 50 MG tablet Take 1 tablet by mouth Daily. 7/28/24  Yes Lionel Cade MD   vitamin D (ERGOCALCIFEROL) 1.25 MG (09195 UT) capsule capsule Take 1 capsule by mouth Every 30 (Thirty) Days.   Yes Lionel Cade MD     Scheduled Meds:[Held by provider] amitriptyline, 10 mg, Oral, Nightly  enoxaparin, 40 mg, Subcutaneous, Daily  folic acid, 1 mg, Oral, Daily  [Held by provider] furosemide, 20 mg, Oral, Daily  insulin glargine, 10 Units, Subcutaneous, Nightly  insulin regular, 2-9 Units, Subcutaneous, Q6H  levothyroxine, 112 mcg, Oral, Daily  multivitamin with minerals, 1 tablet, Oral, Daily  nicotine, 1 patch, Transdermal, Q24H  pregabalin, 300 mg,  Oral, BID  sodium chloride, 10 mL, Intravenous, Q12H  [Held by provider] spironolactone, 50 mg, Oral, Daily  thiamine (B-1) IV, 200 mg, Intravenous, Q8H   Followed by  [START ON 8/20/2024] thiamine, 100 mg, Oral, Daily      Continuous Infusions:   PRN Meds:  acetaminophen    senna-docusate sodium **AND** polyethylene glycol **AND** bisacodyl **AND** bisacodyl    Calcium Replacement - Follow Nurse / BPA Driven Protocol    cloNIDine    dextrose    dextrose    glucagon (human recombinant)    LORazepam **OR** LORazepam **OR** LORazepam **OR** LORazepam **OR** LORazepam **OR** LORazepam    Magnesium Standard Dose Replacement - Follow Nurse / BPA Driven Protocol    Magnesium Standard Dose Replacement - Follow Nurse / BPA Driven Protocol    nicotine polacrilex    nitroglycerin    nitroglycerin    ondansetron ODT **OR** ondansetron    Phosphorus Replacement - Follow Nurse / BPA Driven Protocol    Potassium Replacement - Follow Nurse / BPA Driven Protocol    Potassium Replacement - Follow Nurse / BPA Driven Protocol    [COMPLETED] Insert Peripheral IV **AND** sodium chloride    sodium chloride    sodium chloride  Allergies:  No Known Allergies    Objective   Exam:     Vital Signs  Temp:  [98.2 °F (36.8 °C)-98.9 °F (37.2 °C)] 98.2 °F (36.8 °C)  Heart Rate:  [] 113  Resp:  [18] 18  BP: (104-150)/(46-95) 110/77  SpO2:  [92 %-98 %] 95 %  on  Flow (L/min):  [2] 2;   Device (Oxygen Therapy): nasal cannula  Body mass index is 30.94 kg/m².  EXAM  General:  Agitated, no distress   Head:      Normocephalic and atraumatic.    Eyes:      PERRL/EOM intact, conjunctivae and sclerae clear without nystagmus.    Neck:      No masses, thyromegaly,  trachea central   Lungs:    Clear bilaterally to auscultation.    Heart:      Regular rate and rhythm, no murmur no gallop  Abd:        Soft, nontender, not distended, bowel sounds positive, no shifting dullness.  Msk:        No deformity or scoliosis noted of thoracic or lumbar spine.     Pulses:   Pulses normal in all 4 extremities.    Extremities:        No cyanosis or clubbing--no edema.    Neuro:    No focal deficits.   alert oriented x3  Skin:       Intact without lesions or rashes.    Psych:    Agitation, confusion     Results Review:  I have personally reviewed most recent Data :  [facility]  CBC    Results from last 7 days   Lab Units 08/16/24  0320 08/15/24  0351 08/14/24  0311 08/13/24  0307   WBC 10*3/mm3 6.97 5.74 5.17 6.11   HEMOGLOBIN g/dL 14.2 14.3 14.7 15.1   PLATELETS 10*3/mm3 108* 75* 60* 57*     CMP   Results from last 7 days   Lab Units 08/16/24  0320 08/15/24  0351 08/14/24  0311 08/13/24  1344 08/13/24  0307   SODIUM mmol/L 131* 134* 137 132* 134*   POTASSIUM mmol/L 3.9 4.0 3.9 4.4 3.4*   CHLORIDE mmol/L 100 102 102 99 94*   CO2 mmol/L 16.3* 16.8* 21.7* 23.4 22.0   BUN mg/dL 14 12 9 10 11   CREATININE mg/dL 0.89 0.82 0.66* 0.80 0.82   GLUCOSE mg/dL 131* 114* 115* 160* 215*   ALBUMIN g/dL 3.4* 3.3* 3.4*  --  3.7   BILIRUBIN mg/dL 0.9 1.1 1.1  --  1.2   ALK PHOS U/L 97 90 81  --  98   AST (SGOT) U/L 55* 56* 54*  --  84*   ALT (SGPT) U/L 37 40 44*  --  63*     ABG      CT Cervical Spine Without Contrast    Result Date: 8/13/2024  Electronically signed by Álvaro Rutledge MD on 08-13-24 at 0515    CT Head Without Contrast    Result Date: 8/13/2024  Electronically signed by Álvaro Rutledge MD on 08-13-24 at 0514           Assessment & Plan   Assessment and Plan:         Alcohol withdrawal seizure    ETOH abuse    Type 2 diabetes mellitus    Essential hypertension    Alcoholic cirrhosis of liver with ascites    Hypothyroidism (acquired)    ASSESSMENT:  Chronic hyponatremia  NAGMA  Alcohol withdrawal  Seizure  Alcoholic cirrhosis  DM2  TCP      PLAN :     Patient admitted with seizure r/t alcohol withdrawal  Follows with Dr Venkat Rodriges outpatient for previous LOVELY, chronic hyponatremia in the setting of alcoholic cirrhosis  Sodium 134 on admission, now 131. Will  perform hyponatremia workup including TSH, cortisol, serum osm, uric acid, urine osm, urine sodium  Will implement 1.5L/day fluid restriction, encourage high protein diet  K, Mag and Phos acceptable  Renal function intact, will check UA, UPCR  Monitor BMP daily  NAGMA, likely with component of respiratory alkalosis in patient with cirrhosis, will check VBG  Receiving IV thiamine  BP trends labile, monitor  H&H stable but with TCP likely 2/2 cirrhosis, monitor  Strict I&O's  Avoid nephrotoxic medications including NSAIDs  We will follow and coordinate with team    Thank you for this consultation!      MILY Casas  [facility]  8/16/2024  13:44 EDT    Patient was seen earlier by  APRN. I have reviewed the history, data, problems, assessment and plan with the nurse practitioner during rounds and I concur with the history, exam, assessment and plan as described in the progress note with comments additions, revisions as noted.           Venkat Rodriges MD  8/16/2024  [facility]

## 2024-08-16 NOTE — NURSING NOTE
Pt is agitated at this time. Refusing meds , refusing bath, family and provider have been updated. Nephrology has been consulted. Sitter remains at this time. Report given   (E1) none

## 2024-08-16 NOTE — PROGRESS NOTES
Access Center follow up d/t EtOH; this writer reviewed chart and spoke with RN Conchis. Per RN, patient has been confused and intermittently restless/agitated; he is currently sleeping.  Last CIWA 17 at 01:30; patient remains in restraints (see EMR). No further needs/concerns noted at this time per RN and/or medical team; Access Center to continue following.

## 2024-08-16 NOTE — PLAN OF CARE
Goal Outcome Evaluation:  Plan of Care Reviewed With: patient        Progress: no change  Outcome Evaluation: Vitals stable. Pt maintaining O2  CIWA 13 upon transfer to  from ICU. Pt reassessed after 1 hr of giving ativan, CIWA 5. q2h turn. Non-violet restraints cont'd. Pt needs met and questions answered. Will continue to monitor.

## 2024-08-17 LAB
ALBUMIN SERPL-MCNC: 3.4 G/DL (ref 3.5–5.2)
ALBUMIN/GLOB SERPL: 1 G/DL
ALP SERPL-CCNC: 96 U/L (ref 39–117)
ALT SERPL W P-5'-P-CCNC: 34 U/L (ref 1–41)
ANION GAP SERPL CALCULATED.3IONS-SCNC: 17.2 MMOL/L (ref 5–15)
AST SERPL-CCNC: 49 U/L (ref 1–40)
BASOPHILS # BLD AUTO: 0.05 10*3/MM3 (ref 0–0.2)
BASOPHILS NFR BLD AUTO: 0.7 % (ref 0–1.5)
BILIRUB SERPL-MCNC: 0.8 MG/DL (ref 0–1.2)
BUN SERPL-MCNC: 17 MG/DL (ref 6–20)
BUN/CREAT SERPL: 19.3 (ref 7–25)
CALCIUM SPEC-SCNC: 8.2 MG/DL (ref 8.6–10.5)
CHLORIDE SERPL-SCNC: 101 MMOL/L (ref 98–107)
CO2 SERPL-SCNC: 18.8 MMOL/L (ref 22–29)
CORTIS SERPL-MCNC: 20 MCG/DL
CREAT SERPL-MCNC: 0.88 MG/DL (ref 0.76–1.27)
DEPRECATED RDW RBC AUTO: 44.4 FL (ref 37–54)
EGFRCR SERPLBLD CKD-EPI 2021: 111.5 ML/MIN/1.73
EOSINOPHIL # BLD AUTO: 0.19 10*3/MM3 (ref 0–0.4)
EOSINOPHIL NFR BLD AUTO: 2.7 % (ref 0.3–6.2)
ERYTHROCYTE [DISTWIDTH] IN BLOOD BY AUTOMATED COUNT: 12.3 % (ref 12.3–15.4)
GLOBULIN UR ELPH-MCNC: 3.4 GM/DL
GLUCOSE BLDC GLUCOMTR-MCNC: 131 MG/DL (ref 70–130)
GLUCOSE BLDC GLUCOMTR-MCNC: 136 MG/DL (ref 70–130)
GLUCOSE BLDC GLUCOMTR-MCNC: 143 MG/DL (ref 70–130)
GLUCOSE BLDC GLUCOMTR-MCNC: 197 MG/DL (ref 70–130)
GLUCOSE SERPL-MCNC: 112 MG/DL (ref 65–99)
HCT VFR BLD AUTO: 42.2 % (ref 37.5–51)
HGB BLD-MCNC: 14.1 G/DL (ref 13–17.7)
IMM GRANULOCYTES # BLD AUTO: 0.05 10*3/MM3 (ref 0–0.05)
IMM GRANULOCYTES NFR BLD AUTO: 0.7 % (ref 0–0.5)
LYMPHOCYTES # BLD AUTO: 0.82 10*3/MM3 (ref 0.7–3.1)
LYMPHOCYTES NFR BLD AUTO: 11.6 % (ref 19.6–45.3)
MAGNESIUM SERPL-MCNC: 2.2 MG/DL (ref 1.6–2.6)
MCH RBC QN AUTO: 32.9 PG (ref 26.6–33)
MCHC RBC AUTO-ENTMCNC: 33.4 G/DL (ref 31.5–35.7)
MCV RBC AUTO: 98.6 FL (ref 79–97)
MONOCYTES # BLD AUTO: 1.24 10*3/MM3 (ref 0.1–0.9)
MONOCYTES NFR BLD AUTO: 17.6 % (ref 5–12)
NEUTROPHILS NFR BLD AUTO: 4.69 10*3/MM3 (ref 1.7–7)
NEUTROPHILS NFR BLD AUTO: 66.7 % (ref 42.7–76)
PHOSPHATE SERPL-MCNC: 2.4 MG/DL (ref 2.5–4.5)
PLATELET # BLD AUTO: 129 10*3/MM3 (ref 140–450)
PMV BLD AUTO: 10.8 FL (ref 6–12)
POTASSIUM SERPL-SCNC: 3.7 MMOL/L (ref 3.5–5.2)
PROT SERPL-MCNC: 6.8 G/DL (ref 6–8.5)
RBC # BLD AUTO: 4.28 10*6/MM3 (ref 4.14–5.8)
SODIUM SERPL-SCNC: 137 MMOL/L (ref 136–145)
WBC NRBC COR # BLD AUTO: 7.04 10*3/MM3 (ref 3.4–10.8)

## 2024-08-17 PROCEDURE — 82533 TOTAL CORTISOL: CPT

## 2024-08-17 PROCEDURE — 25010000002 THIAMINE HCL 200 MG/2ML SOLUTION: Performed by: STUDENT IN AN ORGANIZED HEALTH CARE EDUCATION/TRAINING PROGRAM

## 2024-08-17 PROCEDURE — 84100 ASSAY OF PHOSPHORUS: CPT | Performed by: INTERNAL MEDICINE

## 2024-08-17 PROCEDURE — 85025 COMPLETE CBC W/AUTO DIFF WBC: CPT | Performed by: INTERNAL MEDICINE

## 2024-08-17 PROCEDURE — 25010000002 LORAZEPAM PER 2 MG: Performed by: STUDENT IN AN ORGANIZED HEALTH CARE EDUCATION/TRAINING PROGRAM

## 2024-08-17 PROCEDURE — 63710000001 INSULIN GLARGINE PER 5 UNITS: Performed by: STUDENT IN AN ORGANIZED HEALTH CARE EDUCATION/TRAINING PROGRAM

## 2024-08-17 PROCEDURE — 82948 REAGENT STRIP/BLOOD GLUCOSE: CPT

## 2024-08-17 PROCEDURE — 25010000002 ENOXAPARIN PER 10 MG: Performed by: STUDENT IN AN ORGANIZED HEALTH CARE EDUCATION/TRAINING PROGRAM

## 2024-08-17 PROCEDURE — 83735 ASSAY OF MAGNESIUM: CPT | Performed by: INTERNAL MEDICINE

## 2024-08-17 PROCEDURE — 63710000001 INSULIN REGULAR HUMAN PER 5 UNITS: Performed by: STUDENT IN AN ORGANIZED HEALTH CARE EDUCATION/TRAINING PROGRAM

## 2024-08-17 PROCEDURE — 25010000002 THIAMINE PER 100 MG: Performed by: STUDENT IN AN ORGANIZED HEALTH CARE EDUCATION/TRAINING PROGRAM

## 2024-08-17 PROCEDURE — 80053 COMPREHEN METABOLIC PANEL: CPT | Performed by: INTERNAL MEDICINE

## 2024-08-17 PROCEDURE — 25810000003 SODIUM CHLORIDE 0.9 % SOLUTION

## 2024-08-17 RX ORDER — FENTANYL/ROPIVACAINE/NS/PF 2-625MCG/1
15 PLASTIC BAG, INJECTION (ML) EPIDURAL
Status: COMPLETED | OUTPATIENT
Start: 2024-08-17 | End: 2024-08-17

## 2024-08-17 RX ADMIN — Medication 1 TABLET: at 08:28

## 2024-08-17 RX ADMIN — LORAZEPAM 2 MG: 2 INJECTION INTRAMUSCULAR; INTRAVENOUS at 04:10

## 2024-08-17 RX ADMIN — NICOTINE 1 PATCH: 21 PATCH, EXTENDED RELEASE TRANSDERMAL at 11:03

## 2024-08-17 RX ADMIN — THIAMINE HYDROCHLORIDE 200 MG: 100 INJECTION, SOLUTION INTRAMUSCULAR; INTRAVENOUS at 14:16

## 2024-08-17 RX ADMIN — THIAMINE HYDROCHLORIDE 200 MG: 100 INJECTION, SOLUTION INTRAMUSCULAR; INTRAVENOUS at 21:46

## 2024-08-17 RX ADMIN — FOLIC ACID 1 MG: 1 TABLET ORAL at 08:28

## 2024-08-17 RX ADMIN — PREGABALIN 300 MG: 100 CAPSULE ORAL at 08:28

## 2024-08-17 RX ADMIN — INSULIN GLARGINE 10 UNITS: 100 INJECTION, SOLUTION SUBCUTANEOUS at 21:46

## 2024-08-17 RX ADMIN — ENOXAPARIN SODIUM 40 MG: 100 INJECTION SUBCUTANEOUS at 08:28

## 2024-08-17 RX ADMIN — POTASSIUM PHOSPHATE, MONOBASIC AND POTASSIUM PHOSPHATE, DIBASIC 15 MMOL: 224; 236 INJECTION, SOLUTION, CONCENTRATE INTRAVENOUS at 12:35

## 2024-08-17 RX ADMIN — PREGABALIN 300 MG: 100 CAPSULE ORAL at 21:45

## 2024-08-17 RX ADMIN — THIAMINE HYDROCHLORIDE 200 MG: 100 INJECTION, SOLUTION INTRAMUSCULAR; INTRAVENOUS at 06:01

## 2024-08-17 RX ADMIN — LEVOTHYROXINE SODIUM 112 MCG: 112 TABLET ORAL at 08:28

## 2024-08-17 RX ADMIN — INSULIN HUMAN 2 UNITS: 100 INJECTION, SOLUTION PARENTERAL at 17:05

## 2024-08-17 RX ADMIN — POTASSIUM PHOSPHATE, MONOBASIC AND POTASSIUM PHOSPHATE, DIBASIC 15 MMOL: 224; 236 INJECTION, SOLUTION, CONCENTRATE INTRAVENOUS at 15:38

## 2024-08-17 RX ADMIN — Medication 10 ML: at 08:27

## 2024-08-17 RX ADMIN — Medication 10 ML: at 21:46

## 2024-08-17 NOTE — PLAN OF CARE
Problem: Adult Inpatient Plan of Care  Goal: Plan of Care Review  Outcome: Ongoing, Progressing  Flowsheets (Taken 8/17/2024 4813)  Progress: improving  Plan of Care Reviewed With: patient  Outcome Evaluation: Patient's CIWAs ranged from 6 to 7. He has been cooperative with care. Still requiring sitter to ensure safety and redirection. He is tolerating advanced diet. Patient's family visited at bedside and was updated on status. Plan of care ongoing.

## 2024-08-17 NOTE — PROGRESS NOTES
Name: Anthony Lerma ADMIT: 2024   : 1985  PCP: Provider, No Known    MRN: [id number] LOS: 4 days   AGE/SEX: 39 y.o. male  ROOM: [facility]     Subjective   Subjective   Sitting up on side of bed.  He feels better today overall.    Objective   Objective   Vital Signs  Temp:  [97.4 °F (36.3 °C)-98.8 °F (37.1 °C)] 97.6 °F (36.4 °C)  Heart Rate:  [] 106  Resp:  [18-20] 18  BP: (104-148)/(62-97) 119/81  SpO2:  [78 %-98 %] 97 %  on  Flow (L/min):  [2-4] 2;   Device (Oxygen Therapy): nasal cannula  Body mass index is 30.94 kg/m².  Physical Exam  Constitutional:       Appearance: He is ill-appearing.   Pulmonary:      Effort: Pulmonary effort is normal. No respiratory distress.      Breath sounds: No stridor.   Skin:     Coloration: Skin is not pale.   Neurological:      Mental Status: He is alert and oriented to person, place, and time.         Results Review     I reviewed the patient's new clinical results.  Results from last 7 days   Lab Units 24  0500 24  0320 08/15/24  0351 24  0311   WBC 10*3/mm3 7.04 6.97 5.74 5.17   HEMOGLOBIN g/dL 14.1 14.2 14.3 14.7   PLATELETS 10*3/mm3 129* 108* 75* 60*     Results from last 7 days   Lab Units 24  0500 24  0320 08/15/24  0351 24  0311   SODIUM mmol/L 137 131* 134* 137   POTASSIUM mmol/L 3.7 3.9 4.0 3.9   CHLORIDE mmol/L 101 100 102 102   CO2 mmol/L 18.8* 16.3* 16.8* 21.7*   BUN mg/dL 17 14 12 9   CREATININE mg/dL 0.88 0.89 0.82 0.66*   GLUCOSE mg/dL 112* 131* 114* 115*   EGFR mL/min/1.73 111.5 111.1 113.9 121.6     Results from last 7 days   Lab Units 24  0500 24  0320 08/15/24  0351 24  0311   ALBUMIN g/dL 3.4* 3.4* 3.3* 3.4*   BILIRUBIN mg/dL 0.8 0.9 1.1 1.1   ALK PHOS U/L 96 97 90 81   AST (SGOT) U/L 49* 55* 56* 54*   ALT (SGPT) U/L 34 37 40 44*     Results from last 7 days   Lab Units 24  0500 24  0320 08/15/24  1512 08/15/24  0351 24  0311   CALCIUM mg/dL 8.2* 8.2*  --  8.6 8.4*    ALBUMIN g/dL 3.4* 3.4*  --  3.3* 3.4*   MAGNESIUM mg/dL 2.2 2.1  --  2.1 2.0   PHOSPHORUS mg/dL 2.4* 2.6 2.6 2.1* 2.3*       Glucose   Date/Time Value Ref Range Status   08/17/2024 1201 136 (H) 70 - 130 mg/dL Final   08/17/2024 0544 131 (H) 70 - 130 mg/dL Final   08/16/2024 2158 123 70 - 130 mg/dL Final   08/16/2024 1806 130 70 - 130 mg/dL Final   08/16/2024 1351 128 70 - 130 mg/dL Final   08/16/2024 0556 102 70 - 130 mg/dL Final   08/15/2024 2318 126 70 - 130 mg/dL Final       No radiology results for the last day  Scheduled Medications  [Held by provider] amitriptyline, 10 mg, Oral, Nightly  enoxaparin, 40 mg, Subcutaneous, Daily  folic acid, 1 mg, Oral, Daily  [Held by provider] furosemide, 20 mg, Oral, Daily  insulin glargine, 10 Units, Subcutaneous, Nightly  insulin regular, 2-9 Units, Subcutaneous, 4x Daily AC & at Bedtime  levothyroxine, 112 mcg, Oral, Daily  multivitamin with minerals, 1 tablet, Oral, Daily  nicotine, 1 patch, Transdermal, Q24H  potassium phosphate, 15 mmol, Intravenous, Q3H  pregabalin, 300 mg, Oral, BID  sodium chloride, 10 mL, Intravenous, Q12H  [Held by provider] spironolactone, 50 mg, Oral, Daily  thiamine (B-1) IV, 200 mg, Intravenous, Q8H   Followed by  [START ON 8/20/2024] thiamine, 100 mg, Oral, Daily    Infusions   Diet  Diet: Diabetic, Fluid Restriction (240 mL/tray); Consistent Carbohydrate; 1500 mL/day; Fluid Consistency: Thin (IDDSI 0)       Assessment/Plan     Active Hospital Problems    Diagnosis  POA    **Alcohol withdrawal seizure [F10.939, R56.9]  Yes    Type 2 diabetes mellitus [E11.9]  Yes    Essential hypertension [I10]  Yes    Alcoholic cirrhosis of liver with ascites [K70.31]  Yes    Hypothyroidism (acquired) [E03.9]  Yes    ETOH abuse [F10.10]  Yes      Resolved Hospital Problems   No resolved problems to display.       39 y.o. male admitted with Alcohol withdrawal seizure.      08/17/24  Thrombocytopenia is slowly improving.  Received IV Ativan this morning.   Continue with CIWA protocol.    Alcohol Dependence in Withdrawal with Seizure  - continue CIWA protocol with ativan, finished phenobarbital  - continue vitamin replacement   - follow electrolytes and replace as needed  - finished short term course of keppra as recommended by neurology  - appreciate neurology recs and critical care management from [facility]     Type 2 DM  - blood glucose acceptable  - continue lantus 10 units nightly  - cover with ssi/hypoglycemia protocol     HTN  - BP acceptable  - continue current regimen     Alcoholic Cirrhosis  - appears reasonably well compensated, has secondary thrombocytopenia  - holding lasix and aldactone for now     Hypothyroidism  - continue levothyroxine     Metabolic Acidosis  - saline stopped as above  - will monitor as oral intake increases    Flow (L/min):  [2-4] 2    DVT prophylaxis: Lovenox  Discussed with patient.  Anticipated discharge home, 1-2 days            Luiz Corey MD  [facility]  08/17/24  14:48 EDT

## 2024-08-17 NOTE — PROGRESS NOTES
"Access Ctr Note.    Chart reviewed.     Met w/Pt in room #449. Sitter at bedside on approach & remained. Pt sleeping, but did wake to his name being called. He did struggle to keep his eyes open during check in, stating, \"I'm trying to wake up.\"     With 10 being most, Pt rated his craving for EtOH \"None. I don't ever want to drink again\" at present.   He stated not wanting to return to [facility] because he didn't like it. He didn't like \"...the staff, the beds. It wasn't comfortable.\" Acknowledged to Pt that rehab isn't usually comfortable & asked what his desired tx support is to stay sober. Pt sad, \"Whatever you all say.\" Pt then said he just wants to go home.     Pt shared that he has not talked with is family about coming home vs going into another [facility] treatment program. Encouraged Pt to do all he can to be on the same page with his family about his tx needs/plan.     Spoke w/primary RN as well.  .   Access following.     "

## 2024-08-17 NOTE — PLAN OF CARE
Problem: Adult Inpatient Plan of Care  Goal: Plan of Care Review  Outcome: Ongoing, Progressing  Flowsheets (Taken 8/17/2024 0544)  Progress: improving  Plan of Care Reviewed With: patient  Outcome Evaluation: Patient alert to self and place at times, ativan IV given x3, for restlessness, sweating, high heart rate, agitation, this am patient is more alert and aware of situation, wanting to eat food, tolerating fluids well, bath done, plan of care continues.         Problem: Restraint, Nonviolent  Goal: Absence of Harm or Injury  Outcome: Ongoing, Progressing     Problem: Restraint, Nonviolent  Goal: Absence of Harm or Injury  Intervention: Implement Least Restrictive Safety Strategies  Recent Flowsheet Documentation  Taken 8/17/2024 0400 by Rakel Tran RN  Medical Device Protection: tubing secured  Less Restrictive Alternative:   1:1 observation maintained   bed alarm in use   calming techniques promoted   sensory stimulation limited  De-Escalation Techniques:   1:1 observation initiated   increased round frequency   medication administered  Diversional Activities: television  Taken 8/17/2024 0200 by Rakel Tran RN  Medical Device Protection: tubing secured  Less Restrictive Alternative:   1:1 observation maintained   bed alarm in use   calming techniques promoted   emotional support provided   environment adjusted  De-Escalation Techniques:   1:1 observation initiated   stimulation decreased  Diversional Activities: (patient sleeping) other (see comments)  Taken 8/17/2024 0000 by Rakel Tran, RN  Medical Device Protection: tubing secured  Less Restrictive Alternative:   1:1 observation maintained   bed alarm in use   calming techniques promoted   emotional support provided   environment adjusted  De-Escalation Techniques:   1:1 observation initiated   increased round frequency   quiet time facilitated   reoriented   stimulation decreased  Diversional Activities: television  Taken  8/16/2024 2200 by Rakel Tran RN  Medical Device Protection: tubing secured  Less Restrictive Alternative:   1:1 observation maintained   bed alarm in use   calming techniques promoted   emotional support provided   environment adjusted   sensory stimulation limited  De-Escalation Techniques:   1:1 observation initiated   increased round frequency   medication offered   reoriented   stimulation decreased  Diversional Activities: television  Taken 8/16/2024 2035 by Rakel Tran RN  Medical Device Protection: tubing secured  Diversional Activities: television  Taken 8/16/2024 2000 by Rakel Tran RN  Medical Device Protection: tubing secured  Less Restrictive Alternative:   1:1 observation maintained   bed alarm in use   emotional support provided   environment adjusted   medication offered   sensory stimulation limited  De-Escalation Techniques:   1:1 observation initiated   medication administered   increased round frequency  Diversional Activities: television

## 2024-08-17 NOTE — PROGRESS NOTES
[facility] INPATIENT PROGRESS NOTE         [address]    2024      PATIENT IDENTIFICATION:  Name: Anthony Lrema ADMIT: 2024   : 1985  PCP: Provider, No Known    MRN: [id number] LOS: 4 days   AGE/SEX: 39 y.o. male  ROOM: [facility]                     LOS 4    Reason for visit: Critical care management with alcohol withdrawal      SUBJECTIVE:      Doing well out of intensive care.  Out of restraints.  Calm and cooperative.  Still mildly tremulous.  Hospitalist service has taken over medical management outside of ICU and we will sign off.      Objective   OBJECTIVE:    Vital Sign Min/Max for last 24 hours  Temp  Min: 97.4 °F (36.3 °C)  Max: 98.8 °F (37.1 °C)   BP  Min: 104/72  Max: 148/97   Pulse  Min: 97  Max: 125   Resp  Min: 18  Max: 20   SpO2  Min: 78 %  Max: 98 %   No data recorded   No data recorded    Vitals:    24 0245 24 0306 24 0700 24 0831   BP:  104/72 111/62    BP Location:  Right arm Right arm    Patient Position:  Lying Lying    Pulse: 101 97 115    Resp:  20 19    Temp:  97.4 °F (36.3 °C) 97.8 °F (36.6 °C)    TempSrc:  Axillary Oral    SpO2: (!) 78% 96% 98% 98%   Weight:       Height:                24  0507 08/15/24  0300 08/15/24  0503   Weight: 96.8 kg (213 lb 6.5 oz) 97.8 kg (215 lb 9.8 oz) 97.8 kg (215 lb 9.8 oz)       Body mass index is 30.94 kg/m².                          Body mass index is 30.94 kg/m².    Intake/Output Summary (Last 24 hours) at 2024 0932  Last data filed at 2024 0758  Gross per 24 hour   Intake 1680 ml   Output --   Net 1680 ml         Exam:  GEN:  No distress, appears stated age  EYES:   PERRL, anicteric sclerae  ENT:    External ears/nose normal, OP clear  NECK:  No adenopathy, midline trachea  LUNGS: Normal chest on inspection, palpation and auscultation  CV:  Normal S1S2, without murmur  ABD:  Nontender, nondistended, no hepatosplenomegaly, +BS  EXT:  No edema.  No cyanosis or clubbing.  No mottling  and normal cap refill.    Assessment     Scheduled meds:  [Held by provider] amitriptyline, 10 mg, Oral, Nightly  enoxaparin, 40 mg, Subcutaneous, Daily  folic acid, 1 mg, Oral, Daily  [Held by provider] furosemide, 20 mg, Oral, Daily  insulin glargine, 10 Units, Subcutaneous, Nightly  insulin regular, 2-9 Units, Subcutaneous, 4x Daily AC & at Bedtime  levothyroxine, 112 mcg, Oral, Daily  multivitamin with minerals, 1 tablet, Oral, Daily  nicotine, 1 patch, Transdermal, Q24H  pregabalin, 300 mg, Oral, BID  sodium chloride, 10 mL, Intravenous, Q12H  [Held by provider] spironolactone, 50 mg, Oral, Daily  thiamine (B-1) IV, 200 mg, Intravenous, Q8H   Followed by  [START ON 8/20/2024] thiamine, 100 mg, Oral, Daily      IV meds:                         Data Review:  Results from last 7 days   Lab Units 08/17/24  0500 08/16/24  0320 08/15/24  0351 08/14/24  0311 08/13/24  1344   SODIUM mmol/L 137 131* 134* 137 132*   POTASSIUM mmol/L 3.7 3.9 4.0 3.9 4.4   CHLORIDE mmol/L 101 100 102 102 99   CO2 mmol/L 18.8* 16.3* 16.8* 21.7* 23.4   BUN mg/dL 17 14 12 9 10   CREATININE mg/dL 0.88 0.89 0.82 0.66* 0.80   GLUCOSE mg/dL 112* 131* 114* 115* 160*   CALCIUM mg/dL 8.2* 8.2* 8.6 8.4* 8.7         Estimated Creatinine Clearance: 130.8 mL/min (by C-G formula based on SCr of 0.88 mg/dL).  Results from last 7 days   Lab Units 08/17/24  0500 08/16/24  0320 08/15/24  0351 08/14/24  0311 08/13/24  0307   WBC 10*3/mm3 7.04 6.97 5.74 5.17 6.11   HEMOGLOBIN g/dL 14.1 14.2 14.3 14.7 15.1   PLATELETS 10*3/mm3 129* 108* 75* 60* 57*         Results from last 7 days   Lab Units 08/17/24  0500 08/16/24  0320 08/15/24  0351 08/14/24  0311 08/13/24  0307   ALT (SGPT) U/L 34 37 40 44* 63*   AST (SGOT) U/L 49* 55* 56* 54* 84*                 Glucose   Date/Time Value Ref Range Status   08/17/2024 0544 131 (H) 70 - 130 mg/dL Final   08/16/2024 2158 123 70 - 130 mg/dL Final   08/16/2024 1806 130 70 - 130 mg/dL Final   08/16/2024 1351 128 70 - 130 mg/dL  Final   08/16/2024 0556 102 70 - 130 mg/dL Final   08/15/2024 2318 126 70 - 130 mg/dL Final   08/15/2024 1947 114 70 - 130 mg/dL Final         Imaging reviewed  CT head reviewed: No acute              Active Hospital Problems    Diagnosis  POA    **Alcohol withdrawal seizure [F10.939, R56.9]  Yes    Type 2 diabetes mellitus [E11.9]  Yes    Essential hypertension [I10]  Yes    Alcoholic cirrhosis of liver with ascites [K70.31]  Yes    Hypothyroidism (acquired) [E03.9]  Yes    ETOH abuse [F10.10]  Yes      Resolved Hospital Problems   No resolved problems to display.         ASSESSMENT:  Alcohol withdrawal  Treatments  Seizure associate with alcohol withdrawal  Alcohol abuse  Cirrhosis  Diabetes with hyperglycemia  Hyponatremia clinically significant  Thrombocytopenia      PLAN:  Still a bit tremulous but calm and cooperative.  No longer requiring restraints.  Hospitalist service has taken over medical management outside of ICU and we will sign off.      Jesse Sellers MD  Pulmonary and Critical Care Medicine  [facility], [facility]  8/17/2024    09:32 EDT

## 2024-08-17 NOTE — PROGRESS NOTES
PROGRESS NOTE      Patient Name: Anthony Lerma  : 1985  MRN: [id number]  Primary Care Physician: Provider, Laisha Known  Date of admission: 2024    Patient Care Team:  Provider, Laisha Known as PCP - General        Reason for Follow up     Established patient, prior LOVELY, chronic hyponatremia      Subjective     Seen and examined, out of restraints now, more cooperative, tremors, no distress  Na 137, sCr 0.88    Review of systems:  Constitutional: weakness.  HEENT:  No headache, otalgia, itchy eyes, nasal discharge or sore throat.  Cardiac:  No chest pain, dyspnea, orthopnea or PND.  Chest:              No cough, phlegm or wheezing.  Abdomen:  No abdominal pain, nausea or vomiting.  Neuro:  No focal weakness, abnormal movements orseizure like activity.  :   No hematuria, no pyuria, no dysuria, no flank pain.  ROS was otherwise negative except as mentioned in the [place].       Personal History:     Past Medical History:   Past Medical History:   Diagnosis Date    Alcohol abuse with withdrawal     Diabetes mellitus     Seizure        Surgical History:    History reviewed. No pertinent surgical history.    Family History: family history is not on file. Otherwise pertinent FHx was reviewed and unremarkable.     Social History:  reports that he has been smoking cigarettes. He does not have any smokeless tobacco history on file. He reports current alcohol use.    Medications:  Prior to Admission medications    Medication Sig Start Date End Date Taking? Authorizing Provider   atorvastatin (LIPITOR) 20 MG tablet Take 1 tablet by mouth Daily.   Yes Lionel Cade MD   Continuous Glucose Sensor (Dexcom G7 Sensor) misc APPLY ONE SENSOR TOPICALLY EVERY 10 DAYS 24  Yes Lionel Cade MD   Cyanocobalamin 1000 MCG/ML kit Inject 1 mL as directed Every 30 (Thirty) Days.   Yes Lionel Cade MD   furosemide (LASIX) 20 MG tablet Take 1 tablet by mouth Daily. 24  Yes Lionel Cade MD    insulin aspart (NovoLOG FlexPen) 100 UNIT/ML solution pen-injector sc pen 5 UNITS BEFORE SUPPER (IF B/G GREATER THAN 200 USE 5 UNITS FOR EACH 50MG ABOVE 200, MAX 50 UNITS DAILY. 7/31/24  Yes Provider, Historical, MD   Januvia 50 MG tablet Take 1 tablet by mouth Daily. 8/5/24  Yes Lionel Cade MD Lantus SoloStar 100 UNIT/ML injection pen INJECT 8 TO 12 UNITS SUBCUTANEOUSLY ONCE DAILY   Yes Lionel Cade MD   levothyroxine (SYNTHROID, LEVOTHROID) 112 MCG tablet Take 1 tablet by mouth Daily. 6/19/24  Yes Lionel Cade MD   nortriptyline (PAMELOR) 10 MG capsule Take 1 capsule by mouth Every Night. 7/30/24  Yes Lionel Cade MD   pregabalin (LYRICA) 300 MG capsule Take 1 capsule by mouth 2 (Two) Times a Day.   Yes Lionel Cade MD   spironolactone (ALDACTONE) 50 MG tablet Take 1 tablet by mouth Daily. 7/28/24  Yes Lionel Cade MD   vitamin D (ERGOCALCIFEROL) 1.25 MG (06270 UT) capsule capsule Take 1 capsule by mouth Every 30 (Thirty) Days.   Yes Provider, MD Lionel     Scheduled Meds:[Held by provider] amitriptyline, 10 mg, Oral, Nightly  enoxaparin, 40 mg, Subcutaneous, Daily  folic acid, 1 mg, Oral, Daily  [Held by provider] furosemide, 20 mg, Oral, Daily  insulin glargine, 10 Units, Subcutaneous, Nightly  insulin regular, 2-9 Units, Subcutaneous, 4x Daily AC & at Bedtime  levothyroxine, 112 mcg, Oral, Daily  multivitamin with minerals, 1 tablet, Oral, Daily  nicotine, 1 patch, Transdermal, Q24H  pregabalin, 300 mg, Oral, BID  sodium chloride, 10 mL, Intravenous, Q12H  [Held by provider] spironolactone, 50 mg, Oral, Daily  thiamine (B-1) IV, 200 mg, Intravenous, Q8H   Followed by  [START ON 8/20/2024] thiamine, 100 mg, Oral, Daily      Continuous Infusions:   PRN Meds:  acetaminophen    senna-docusate sodium **AND** polyethylene glycol **AND** bisacodyl **AND** bisacodyl    Calcium Replacement - Follow Nurse / BPA Driven Protocol    cloNIDine    dextrose     dextrose    glucagon (human recombinant)    LORazepam **OR** LORazepam **OR** LORazepam **OR** LORazepam **OR** LORazepam **OR** LORazepam    Magnesium Standard Dose Replacement - Follow Nurse / BPA Driven Protocol    Magnesium Standard Dose Replacement - Follow Nurse / BPA Driven Protocol    nicotine polacrilex    nitroglycerin    nitroglycerin    ondansetron ODT **OR** ondansetron    Phosphorus Replacement - Follow Nurse / BPA Driven Protocol    Potassium Replacement - Follow Nurse / BPA Driven Protocol    Potassium Replacement - Follow Nurse / BPA Driven Protocol    [COMPLETED] Insert Peripheral IV **AND** sodium chloride    sodium chloride    sodium chloride  Allergies:  No Known Allergies    Objective   Exam:     Vital Signs  Temp:  [97.4 °F (36.3 °C)-98.8 °F (37.1 °C)] 97.8 °F (36.6 °C)  Heart Rate:  [] 115  Resp:  [18-20] 19  BP: (104-148)/(62-97) 111/62  SpO2:  [78 %-98 %] 98 %  on  Flow (L/min):  [2-4] 2;   Device (Oxygen Therapy): nasal cannula  Body mass index is 30.94 kg/m².  EXAM  General:  Alert, tremulous, no distress   Head:      Normocephalic and atraumatic.    Eyes:      PERRL/EOM intact, conjunctivae and sclerae clear without nystagmus.    Neck:      No masses, thyromegaly,  trachea central   Lungs:    Clear bilaterally to auscultation.    Heart:      Regular rate and rhythm, no murmur no gallop  Abd:        Soft, nontender, not distended, bowel sounds positive, no shifting dullness.  Msk:        No deformity or scoliosis noted of thoracic or lumbar spine.    Pulses:   Pulses normal in all 4 extremities.    Extremities:        No cyanosis or clubbing--no edema.    Neuro:    No focal deficits.   alert oriented x3  Skin:       Intact without lesions or rashes.    Psych:   Calm    Results Review:  I have personally reviewed most recent Data :  BMP @LABRCNT(creatinine:10)  CBC    Results from last 7 days   Lab Units 08/17/24  0500 08/16/24  0320 08/15/24  0351 08/14/24  0311 08/13/24  0307    WBC 10*3/mm3 7.04 6.97 5.74 5.17 6.11   HEMOGLOBIN g/dL 14.1 14.2 14.3 14.7 15.1   PLATELETS 10*3/mm3 129* 108* 75* 60* 57*     CMP   Results from last 7 days   Lab Units 08/17/24  0500 08/16/24  0320 08/15/24  0351 08/14/24  0311 08/13/24  1344 08/13/24  0307   SODIUM mmol/L 137 131* 134* 137 132* 134*   POTASSIUM mmol/L 3.7 3.9 4.0 3.9 4.4 3.4*   CHLORIDE mmol/L 101 100 102 102 99 94*   CO2 mmol/L 18.8* 16.3* 16.8* 21.7* 23.4 22.0   BUN mg/dL 17 14 12 9 10 11   CREATININE mg/dL 0.88 0.89 0.82 0.66* 0.80 0.82   GLUCOSE mg/dL 112* 131* 114* 115* 160* 215*   ALBUMIN g/dL 3.4* 3.4* 3.3* 3.4*  --  3.7   BILIRUBIN mg/dL 0.8 0.9 1.1 1.1  --  1.2   ALK PHOS U/L 96 97 90 81  --  98   AST (SGOT) U/L 49* 55* 56* 54*  --  84*   ALT (SGPT) U/L 34 37 40 44*  --  63*     ABG      CT Cervical Spine Without Contrast    Result Date: 8/13/2024  Electronically signed by Álvaro Rutledge MD on 08-13-24 at 0515    CT Head Without Contrast    Result Date: 8/13/2024  Electronically signed by Álvaro Rutledge MD on 08-13-24 at 0514           Assessment & Plan   Assessment and Plan:         Alcohol withdrawal seizure    ETOH abuse    Type 2 diabetes mellitus    Essential hypertension    Alcoholic cirrhosis of liver with ascites    Hypothyroidism (acquired)    ASSESSMENT:  Chronic hyponatremia  NAGMA  Alcohol withdrawal  Seizure  Alcoholic cirrhosis  DM2  TCP      PLAN :     Patient admitted with seizure r/t alcohol withdrawal  Follows with Dr Venkat Rodriges outpatient for previous LOVELY, chronic hyponatremia in the setting of alcoholic cirrhosis  Sodium 134 on admission, now 137.  1.8L/day fluid restriction, encourage high protein diet  K, Mag acceptable, Phos on low side: will give Kphos IV x 2  Renal function intact, will check UA with ketones, proteinuria, hematuria, UPCR 1.8g  Monitor BMP daily  NAGMA, likely with component of respiratory alkalosis in patient with cirrhosis, pH compensated  Receiving IV thiamine  BP trends labile,  monitor  H&H stable but with TCP likely 2/2 cirrhosis, monitor  Strict I&O's  Avoid nephrotoxic medications including NSAIDs  We will follow and coordinate with team    Note transcribed for MILY Conroy  [facility]  8/17/2024  11:08 EDT    I have seen the pt and reviewed chart and confirmed the accuracy    as documented by the APRN and agree with the plans.     Owen Helm MD     [facility]

## 2024-08-18 PROBLEM — R56.9 ALCOHOL WITHDRAWAL SEIZURE: Status: RESOLVED | Noted: 2024-08-13 | Resolved: 2024-08-18

## 2024-08-18 PROBLEM — F10.939 ALCOHOL WITHDRAWAL SEIZURE: Status: RESOLVED | Noted: 2024-08-13 | Resolved: 2024-08-18

## 2024-08-18 LAB
ALBUMIN SERPL-MCNC: 3.2 G/DL (ref 3.5–5.2)
ALBUMIN/GLOB SERPL: 1.1 G/DL
ALP SERPL-CCNC: 94 U/L (ref 39–117)
ALT SERPL W P-5'-P-CCNC: 29 U/L (ref 1–41)
ANION GAP SERPL CALCULATED.3IONS-SCNC: 10 MMOL/L (ref 5–15)
AST SERPL-CCNC: 36 U/L (ref 1–40)
BASOPHILS # BLD AUTO: 0.05 10*3/MM3 (ref 0–0.2)
BASOPHILS NFR BLD AUTO: 0.8 % (ref 0–1.5)
BILIRUB SERPL-MCNC: 0.6 MG/DL (ref 0–1.2)
BUN SERPL-MCNC: 13 MG/DL (ref 6–20)
BUN/CREAT SERPL: 15.7 (ref 7–25)
CALCIUM SPEC-SCNC: 8.2 MG/DL (ref 8.6–10.5)
CHLORIDE SERPL-SCNC: 99 MMOL/L (ref 98–107)
CO2 SERPL-SCNC: 22 MMOL/L (ref 22–29)
CREAT SERPL-MCNC: 0.83 MG/DL (ref 0.76–1.27)
DEPRECATED RDW RBC AUTO: 43.8 FL (ref 37–54)
EGFRCR SERPLBLD CKD-EPI 2021: 113.5 ML/MIN/1.73
EOSINOPHIL # BLD AUTO: 0.15 10*3/MM3 (ref 0–0.4)
EOSINOPHIL NFR BLD AUTO: 2.5 % (ref 0.3–6.2)
ERYTHROCYTE [DISTWIDTH] IN BLOOD BY AUTOMATED COUNT: 12.2 % (ref 12.3–15.4)
GLOBULIN UR ELPH-MCNC: 2.9 GM/DL
GLUCOSE BLDC GLUCOMTR-MCNC: 157 MG/DL (ref 70–130)
GLUCOSE BLDC GLUCOMTR-MCNC: 168 MG/DL (ref 70–130)
GLUCOSE BLDC GLUCOMTR-MCNC: 198 MG/DL (ref 70–130)
GLUCOSE BLDC GLUCOMTR-MCNC: 200 MG/DL (ref 70–130)
GLUCOSE SERPL-MCNC: 170 MG/DL (ref 65–99)
HCT VFR BLD AUTO: 40.7 % (ref 37.5–51)
HGB BLD-MCNC: 13.7 G/DL (ref 13–17.7)
IMM GRANULOCYTES # BLD AUTO: 0.06 10*3/MM3 (ref 0–0.05)
IMM GRANULOCYTES NFR BLD AUTO: 1 % (ref 0–0.5)
LYMPHOCYTES # BLD AUTO: 1.17 10*3/MM3 (ref 0.7–3.1)
LYMPHOCYTES NFR BLD AUTO: 19.7 % (ref 19.6–45.3)
MAGNESIUM SERPL-MCNC: 2 MG/DL (ref 1.6–2.6)
MCH RBC QN AUTO: 33.1 PG (ref 26.6–33)
MCHC RBC AUTO-ENTMCNC: 33.7 G/DL (ref 31.5–35.7)
MCV RBC AUTO: 98.3 FL (ref 79–97)
MONOCYTES # BLD AUTO: 1.04 10*3/MM3 (ref 0.1–0.9)
MONOCYTES NFR BLD AUTO: 17.5 % (ref 5–12)
NEUTROPHILS NFR BLD AUTO: 3.46 10*3/MM3 (ref 1.7–7)
NEUTROPHILS NFR BLD AUTO: 58.5 % (ref 42.7–76)
PHOSPHATE SERPL-MCNC: 2.4 MG/DL (ref 2.5–4.5)
PLATELET # BLD AUTO: 134 10*3/MM3 (ref 140–450)
PMV BLD AUTO: 11 FL (ref 6–12)
POTASSIUM SERPL-SCNC: 3.9 MMOL/L (ref 3.5–5.2)
PROT SERPL-MCNC: 6.1 G/DL (ref 6–8.5)
RBC # BLD AUTO: 4.14 10*6/MM3 (ref 4.14–5.8)
SODIUM SERPL-SCNC: 131 MMOL/L (ref 136–145)
WBC NRBC COR # BLD AUTO: 5.93 10*3/MM3 (ref 3.4–10.8)

## 2024-08-18 PROCEDURE — 63710000001 INSULIN LISPRO (HUMAN) PER 5 UNITS: Performed by: STUDENT IN AN ORGANIZED HEALTH CARE EDUCATION/TRAINING PROGRAM

## 2024-08-18 PROCEDURE — 97530 THERAPEUTIC ACTIVITIES: CPT

## 2024-08-18 PROCEDURE — 63710000001 INSULIN REGULAR HUMAN PER 5 UNITS: Performed by: STUDENT IN AN ORGANIZED HEALTH CARE EDUCATION/TRAINING PROGRAM

## 2024-08-18 PROCEDURE — 83735 ASSAY OF MAGNESIUM: CPT | Performed by: INTERNAL MEDICINE

## 2024-08-18 PROCEDURE — 63710000001 INSULIN GLARGINE PER 5 UNITS: Performed by: STUDENT IN AN ORGANIZED HEALTH CARE EDUCATION/TRAINING PROGRAM

## 2024-08-18 PROCEDURE — 97162 PT EVAL MOD COMPLEX 30 MIN: CPT

## 2024-08-18 PROCEDURE — 80053 COMPREHEN METABOLIC PANEL: CPT | Performed by: INTERNAL MEDICINE

## 2024-08-18 PROCEDURE — 25810000003 SODIUM CHLORIDE 0.9 % SOLUTION

## 2024-08-18 PROCEDURE — 25010000002 ENOXAPARIN PER 10 MG: Performed by: STUDENT IN AN ORGANIZED HEALTH CARE EDUCATION/TRAINING PROGRAM

## 2024-08-18 PROCEDURE — 25010000002 THIAMINE HCL 200 MG/2ML SOLUTION: Performed by: STUDENT IN AN ORGANIZED HEALTH CARE EDUCATION/TRAINING PROGRAM

## 2024-08-18 PROCEDURE — 85025 COMPLETE CBC W/AUTO DIFF WBC: CPT | Performed by: INTERNAL MEDICINE

## 2024-08-18 PROCEDURE — 82948 REAGENT STRIP/BLOOD GLUCOSE: CPT

## 2024-08-18 PROCEDURE — 84100 ASSAY OF PHOSPHORUS: CPT | Performed by: INTERNAL MEDICINE

## 2024-08-18 RX ORDER — FOLIC ACID 1 MG/1
1 TABLET ORAL DAILY
Qty: 30 TABLET | Refills: 1 | Status: SHIPPED | OUTPATIENT
Start: 2024-08-19

## 2024-08-18 RX ORDER — LANOLIN ALCOHOL/MO/W.PET/CERES
100 CREAM (GRAM) TOPICAL DAILY
Qty: 30 TABLET | Refills: 1 | Status: SHIPPED | OUTPATIENT
Start: 2024-08-20

## 2024-08-18 RX ORDER — FENTANYL/ROPIVACAINE/NS/PF 2-625MCG/1
15 PLASTIC BAG, INJECTION (ML) EPIDURAL
Status: DISCONTINUED | OUTPATIENT
Start: 2024-08-18 | End: 2024-08-18

## 2024-08-18 RX ORDER — MULTIPLE VITAMINS W/ MINERALS TAB 9MG-400MCG
1 TAB ORAL DAILY
Qty: 30 EACH | Refills: 1 | Status: SHIPPED | OUTPATIENT
Start: 2024-08-19

## 2024-08-18 RX ORDER — INSULIN LISPRO 100 [IU]/ML
2-9 INJECTION, SOLUTION INTRAVENOUS; SUBCUTANEOUS
Status: DISCONTINUED | OUTPATIENT
Start: 2024-08-18 | End: 2024-08-20 | Stop reason: HOSPADM

## 2024-08-18 RX ADMIN — LEVOTHYROXINE SODIUM 112 MCG: 112 TABLET ORAL at 08:01

## 2024-08-18 RX ADMIN — INSULIN LISPRO 2 UNITS: 100 INJECTION, SOLUTION INTRAVENOUS; SUBCUTANEOUS at 17:18

## 2024-08-18 RX ADMIN — NICOTINE 1 PATCH: 21 PATCH, EXTENDED RELEASE TRANSDERMAL at 11:15

## 2024-08-18 RX ADMIN — Medication 1 TABLET: at 08:01

## 2024-08-18 RX ADMIN — PREGABALIN 300 MG: 100 CAPSULE ORAL at 08:01

## 2024-08-18 RX ADMIN — ENOXAPARIN SODIUM 40 MG: 100 INJECTION SUBCUTANEOUS at 08:01

## 2024-08-18 RX ADMIN — POTASSIUM, SODIUM PHOSPHATES 280 MG-160 MG-250 MG ORAL POWDER PACKET 2 PACKET: POWDER IN PACKET at 14:04

## 2024-08-18 RX ADMIN — INSULIN HUMAN 4 UNITS: 100 INJECTION, SOLUTION PARENTERAL at 08:01

## 2024-08-18 RX ADMIN — Medication 10 ML: at 08:02

## 2024-08-18 RX ADMIN — THIAMINE HYDROCHLORIDE 200 MG: 100 INJECTION, SOLUTION INTRAMUSCULAR; INTRAVENOUS at 05:47

## 2024-08-18 RX ADMIN — INSULIN LISPRO 2 UNITS: 100 INJECTION, SOLUTION INTRAVENOUS; SUBCUTANEOUS at 12:21

## 2024-08-18 RX ADMIN — INSULIN GLARGINE 10 UNITS: 100 INJECTION, SOLUTION SUBCUTANEOUS at 20:26

## 2024-08-18 RX ADMIN — PREGABALIN 300 MG: 100 CAPSULE ORAL at 20:26

## 2024-08-18 RX ADMIN — FOLIC ACID 1 MG: 1 TABLET ORAL at 08:01

## 2024-08-18 NOTE — PROGRESS NOTES
PROGRESS NOTE      Patient Name: Anthony Lerma  : 1985  MRN: [id number]  Primary Care Physician: Provider, Laisha Known  Date of admission: 2024    Patient Care Team:  Provider, Laisha Known as PCP - General        Reason for Follow up     Established patient, prior LOVELY, chronic hyponatremia      Subjective     Seen and examined, no distress  Na 131, sCr 0.8    Review of systems:  Constitutional: weakness.  HEENT:  No headache, otalgia, itchy eyes, nasal discharge or sore throat.  Cardiac:  No chest pain, dyspnea, orthopnea or PND.  Chest:              No cough, phlegm or wheezing.  Abdomen:  No abdominal pain, nausea or vomiting.  Neuro:  No focal weakness, abnormal movements orseizure like activity.  :   No hematuria, no pyuria, no dysuria, no flank pain.  ROS was otherwise negative except as mentioned in the [place].       Personal History:     Past Medical History:   Past Medical History:   Diagnosis Date    Alcohol abuse with withdrawal     Diabetes mellitus     Seizure        Surgical History:    History reviewed. No pertinent surgical history.    Family History: family history is not on file. Otherwise pertinent FHx was reviewed and unremarkable.     Social History:  reports that he has been smoking cigarettes. He does not have any smokeless tobacco history on file. He reports current alcohol use. He reports that he does not use drugs.    Medications:  Prior to Admission medications    Medication Sig Start Date End Date Taking? Authorizing Provider   atorvastatin (LIPITOR) 20 MG tablet Take 1 tablet by mouth Daily.   Yes Lionel Cade MD   Continuous Glucose Sensor (Dexcom G7 Sensor) misc APPLY ONE SENSOR TOPICALLY EVERY 10 DAYS 24  Yes Lionel Cade MD   Cyanocobalamin 1000 MCG/ML kit Inject 1 mL as directed Every 30 (Thirty) Days.   Yes Lionel Cade MD   furosemide (LASIX) 20 MG tablet Take 1 tablet by mouth Daily. 24  Yes Lionel Cade MD   insulin aspart  (NovoLOG FlexPen) 100 UNIT/ML solution pen-injector sc pen 5 UNITS BEFORE SUPPER (IF B/G GREATER THAN 200 USE 5 UNITS FOR EACH 50MG ABOVE 200, MAX 50 UNITS DAILY. 7/31/24  Yes Provider, Historical, MD   Januvia 50 MG tablet Take 1 tablet by mouth Daily. 8/5/24  Yes Lionel Cade MD Lantus SoloStar 100 UNIT/ML injection pen INJECT 8 TO 12 UNITS SUBCUTANEOUSLY ONCE DAILY   Yes Lionel Cade MD   levothyroxine (SYNTHROID, LEVOTHROID) 112 MCG tablet Take 1 tablet by mouth Daily. 6/19/24  Yes Lionel Cade MD   nortriptyline (PAMELOR) 10 MG capsule Take 1 capsule by mouth Every Night. 7/30/24  Yes Lionel Cade MD   pregabalin (LYRICA) 300 MG capsule Take 1 capsule by mouth 2 (Two) Times a Day.   Yes Lionel Cade MD   spironolactone (ALDACTONE) 50 MG tablet Take 1 tablet by mouth Daily. 7/28/24  Yes Lionel Cade MD   vitamin D (ERGOCALCIFEROL) 1.25 MG (55373 UT) capsule capsule Take 1 capsule by mouth Every 30 (Thirty) Days.   Yes ProviderLionel MD     Scheduled Meds:[Held by provider] amitriptyline, 10 mg, Oral, Nightly  enoxaparin, 40 mg, Subcutaneous, Daily  folic acid, 1 mg, Oral, Daily  [Held by provider] furosemide, 20 mg, Oral, Daily  insulin glargine, 10 Units, Subcutaneous, Nightly  insulin lispro, 2-9 Units, Subcutaneous, 4x Daily AC & at Bedtime  levothyroxine, 112 mcg, Oral, Daily  multivitamin with minerals, 1 tablet, Oral, Daily  nicotine, 1 patch, Transdermal, Q24H  pregabalin, 300 mg, Oral, BID  sodium chloride, 10 mL, Intravenous, Q12H  [Held by provider] spironolactone, 50 mg, Oral, Daily  thiamine (B-1) IV, 200 mg, Intravenous, Q8H   Followed by  [START ON 8/20/2024] thiamine, 100 mg, Oral, Daily      Continuous Infusions:   PRN Meds:  acetaminophen    senna-docusate sodium **AND** polyethylene glycol **AND** bisacodyl **AND** bisacodyl    Calcium Replacement - Follow Nurse / BPA Driven Protocol    cloNIDine    dextrose    dextrose    glucagon  (human recombinant)    Magnesium Standard Dose Replacement - Follow Nurse / BPA Driven Protocol    Magnesium Standard Dose Replacement - Follow Nurse / BPA Driven Protocol    nicotine polacrilex    nitroglycerin    nitroglycerin    ondansetron ODT **OR** ondansetron    Phosphorus Replacement - Follow Nurse / BPA Driven Protocol    Potassium Replacement - Follow Nurse / BPA Driven Protocol    Potassium Replacement - Follow Nurse / BPA Driven Protocol    [COMPLETED] Insert Peripheral IV **AND** sodium chloride    sodium chloride    sodium chloride  Allergies:  No Known Allergies    Objective   Exam:     Vital Signs  Temp:  [97.6 °F (36.4 °C)-98.6 °F (37 °C)] 98.6 °F (37 °C)  Heart Rate:  [] 90  Resp:  [18-20] 18  BP: (106-129)/(61-81) 129/77  SpO2:  [96 %-98 %] 96 %  on  Flow (L/min):  [2] 2;   Device (Oxygen Therapy): nasal cannula  Body mass index is 30.94 kg/m².  EXAM  General:  Alert, no distress   Head:      Normocephalic and atraumatic.    Eyes:      PERRL/EOM intact, conjunctivae and sclerae clear without nystagmus.    Neck:      No masses, thyromegaly,  trachea central   Lungs:    Clear bilaterally to auscultation.    Heart:      Regular rate and rhythm, no murmur no gallop  Abd:        Soft, nontender, not distended, bowel sounds positive, no shifting dullness.  Msk:        No deformity or scoliosis noted of thoracic or lumbar spine.    Pulses:   Pulses normal in all 4 extremities.    Extremities:        No cyanosis or clubbing--no edema.    Neuro:    No focal deficits.   alert oriented x3  Skin:       Intact without lesions or rashes.    Psych:   Calm    Results Review:  I have personally reviewed most recent Data :  BMP @LABRCNT(creatinine:10)  CBC    Results from last 7 days   Lab Units 08/18/24  0314 08/17/24  0500 08/16/24  0320 08/15/24  0351 08/14/24  0311 08/13/24  0307   WBC 10*3/mm3 5.93 7.04 6.97 5.74 5.17 6.11   HEMOGLOBIN g/dL 13.7 14.1 14.2 14.3 14.7 15.1   PLATELETS 10*3/mm3 134* 129*  108* 75* 60* 57*     CMP   Results from last 7 days   Lab Units 08/18/24  0314 08/17/24  0500 08/16/24  0320 08/15/24  0351 08/14/24  0311 08/13/24  1344 08/13/24  0307   SODIUM mmol/L 131* 137 131* 134* 137 132* 134*   POTASSIUM mmol/L 3.9 3.7 3.9 4.0 3.9 4.4 3.4*   CHLORIDE mmol/L 99 101 100 102 102 99 94*   CO2 mmol/L 22.0 18.8* 16.3* 16.8* 21.7* 23.4 22.0   BUN mg/dL 13 17 14 12 9 10 11   CREATININE mg/dL 0.83 0.88 0.89 0.82 0.66* 0.80 0.82   GLUCOSE mg/dL 170* 112* 131* 114* 115* 160* 215*   ALBUMIN g/dL 3.2* 3.4* 3.4* 3.3* 3.4*  --  3.7   BILIRUBIN mg/dL 0.6 0.8 0.9 1.1 1.1  --  1.2   ALK PHOS U/L 94 96 97 90 81  --  98   AST (SGOT) U/L 36 49* 55* 56* 54*  --  84*   ALT (SGPT) U/L 29 34 37 40 44*  --  63*     ABG      CT Cervical Spine Without Contrast    Result Date: 8/13/2024  Electronically signed by Álvaro Rutledge MD on 08-13-24 at 0515    CT Head Without Contrast    Result Date: 8/13/2024  Electronically signed by Álvaro Rutledge MD on 08-13-24 at 0514           Assessment & Plan   Assessment and Plan:         Alcohol withdrawal seizure    ETOH abuse    Type 2 diabetes mellitus    Essential hypertension    Alcoholic cirrhosis of liver with ascites    Hypothyroidism (acquired)    ASSESSMENT:  Chronic hyponatremia  NAGMA  Alcohol withdrawal  Seizure  Alcoholic cirrhosis  DM2  TCP      PLAN :     Patient admitted with seizure r/t alcohol withdrawal  Follows with Dr Venkat Rodriges outpatient for previous LOVELY, chronic hyponatremia in the setting of alcoholic cirrhosis  Sodium 134 on admission, now 131  Will tighten FR to 1.2L/day fluid restriction, encourage high protein diet  K, Mag acceptable, Phos on low side: will give Kphos IV x 2  Renal function intact, UA with ketones, proteinuria, hematuria, UPCR 1.8g  Monitor BMP daily  NAGMA, now improved, likely with component of respiratory alkalosis in patient with cirrhosis, pH compensated  Receiving IV thiamine  BP trends labile, monitor  H&H stable but with TCP  likely 2/2 cirrhosis, monitor  Strict I&O's  Avoid nephrotoxic medications including NSAIDs  We will follow and coordinate with team    Note transcribed for [facility] ConroyCentral [facility]  8/18/2024  11:10 EDT    I have seen the pt and reviewed chart and confirmed the accuracy    as documented by the APRN and agree with the plans.     MD Israel MascorroCentral [facility]

## 2024-08-18 NOTE — PLAN OF CARE
Problem: Adult Inpatient Plan of Care  Goal: Plan of Care Review  Outcome: Ongoing, Progressing  Flowsheets (Taken 8/18/2024 1803)  Progress: improving  Plan of Care Reviewed With: patient  Outcome Evaluation: Patient cooperative with care. Safety sitter no longer needed. CIWAs 3. Phos replaced per nephrology's order. Nephrology updated patient's fluid restriction. PT evaluated and is recommending rehab. Discharge plan in progress.

## 2024-08-18 NOTE — PLAN OF CARE
Problem: Restraint, Nonviolent  Goal: Absence of Harm or Injury  Outcome: Ongoing, Progressing     Problem: Skin Injury Risk Increased  Goal: Skin Health and Integrity  Intervention: Optimize Skin Protection  Recent Flowsheet Documentation  Taken 8/18/2024 1943 by Maddie Paredes RN  Head of Bed (HOB) Positioning: HOB elevated     Problem: Fall Injury Risk  Goal: Absence of Fall and Fall-Related Injury  Intervention: Promote Injury-Free Environment  Recent Flowsheet Documentation  Taken 8/18/2024 1943 by Maddie Paredes, RN  Safety Promotion/Fall Prevention:   safety round/check completed   fall prevention program maintained   Goal Outcome Evaluation:

## 2024-08-18 NOTE — PLAN OF CARE
Goal Outcome Evaluation:  Plan of Care Reviewed With: patient, spouse   MEGHAN 4, pt states he's at [place]. Otherwise      Progress: improving

## 2024-08-18 NOTE — PROGRESS NOTES
Name: Anthony Lerma ADMIT: 2024   : 1985  PCP: Provider, No Known    MRN: [id number] LOS: 5 days   AGE/SEX: 39 y.o. male  ROOM: E4/     Subjective   Subjective   Sitting up in bed.  He reports he is feeling better.  Has not required any as needed Ativan in greater than 24 hours.    Objective   Objective   Vital Signs  Temp:  [97.6 °F (36.4 °C)-98.6 °F (37 °C)] 98.6 °F (37 °C)  Heart Rate:  [] 90  Resp:  [18-20] 18  BP: (106-129)/(61-81) 129/77  SpO2:  [96 %-98 %] 96 %  on  Flow (L/min):  [2] 2;   Device (Oxygen Therapy): nasal cannula  Body mass index is 30.94 kg/m².  Physical Exam  Constitutional:       Appearance: He is ill-appearing.   Pulmonary:      Effort: Pulmonary effort is normal. No respiratory distress.      Breath sounds: No stridor.   Skin:     Coloration: Skin is not pale.   Neurological:      Mental Status: He is alert and oriented to person, place, and time.         Results Review     I reviewed the patient's new clinical results.  Results from last 7 days   Lab Units 24  03124  0500 24  0320 08/15/24  0351   WBC 10*3/mm3 5.93 7.04 6.97 5.74   HEMOGLOBIN g/dL 13.7 14.1 14.2 14.3   PLATELETS 10*3/mm3 134* 129* 108* 75*     Results from last 7 days   Lab Units 24  0314 24  0500 24  0320 08/15/24  0351   SODIUM mmol/L 131* 137 131* 134*   POTASSIUM mmol/L 3.9 3.7 3.9 4.0   CHLORIDE mmol/L 99 101 100 102   CO2 mmol/L 22.0 18.8* 16.3* 16.8*   BUN mg/dL 13 17 14 12   CREATININE mg/dL 0.83 0.88 0.89 0.82   GLUCOSE mg/dL 170* 112* 131* 114*   EGFR mL/min/1.73 113.5 111.5 111.1 113.9     Results from last 7 days   Lab Units 24  0314 24  0500 24  0320 08/15/24  0351   ALBUMIN g/dL 3.2* 3.4* 3.4* 3.3*   BILIRUBIN mg/dL 0.6 0.8 0.9 1.1   ALK PHOS U/L 94 96 97 90   AST (SGOT) U/L 36 49* 55* 56*   ALT (SGPT) U/L 29 34 37 40     Results from last 7 days   Lab Units 24  0314 24  0500 24  0320 08/15/24  1512  08/15/24  0351   CALCIUM mg/dL 8.2* 8.2* 8.2*  --  8.6   ALBUMIN g/dL 3.2* 3.4* 3.4*  --  3.3*   MAGNESIUM mg/dL 2.0 2.2 2.1  --  2.1   PHOSPHORUS mg/dL 2.4* 2.4* 2.6 2.6 2.1*       Glucose   Date/Time Value Ref Range Status   08/18/2024 0733 200 (H) 70 - 130 mg/dL Final   08/17/2024 2030 143 (H) 70 - 130 mg/dL Final   08/17/2024 1651 197 (H) 70 - 130 mg/dL Final   08/17/2024 1201 136 (H) 70 - 130 mg/dL Final   08/17/2024 0544 131 (H) 70 - 130 mg/dL Final   08/16/2024 2158 123 70 - 130 mg/dL Final   08/16/2024 1806 130 70 - 130 mg/dL Final       No radiology results for the last day  Scheduled Medications  [Held by provider] amitriptyline, 10 mg, Oral, Nightly  enoxaparin, 40 mg, Subcutaneous, Daily  folic acid, 1 mg, Oral, Daily  [Held by provider] furosemide, 20 mg, Oral, Daily  insulin glargine, 10 Units, Subcutaneous, Nightly  insulin lispro, 2-9 Units, Subcutaneous, 4x Daily AC & at Bedtime  levothyroxine, 112 mcg, Oral, Daily  multivitamin with minerals, 1 tablet, Oral, Daily  nicotine, 1 patch, Transdermal, Q24H  pregabalin, 300 mg, Oral, BID  sodium chloride, 10 mL, Intravenous, Q12H  [Held by provider] spironolactone, 50 mg, Oral, Daily  thiamine (B-1) IV, 200 mg, Intravenous, Q8H   Followed by  [START ON 8/20/2024] thiamine, 100 mg, Oral, Daily    Infusions   Diet  Diet: Diabetic, Fluid Restriction (240 mL/tray); Consistent Carbohydrate; 1500 mL/day; Fluid Consistency: Thin (IDDSI 0)       Assessment/Plan     Active Hospital Problems    Diagnosis  POA    **Alcohol withdrawal seizure [F10.939, R56.9]  Yes    Type 2 diabetes mellitus [E11.9]  Yes    Essential hypertension [I10]  Yes    Alcoholic cirrhosis of liver with ascites [K70.31]  Yes    Hypothyroidism (acquired) [E03.9]  Yes    ETOH abuse [F10.10]  Yes      Resolved Hospital Problems   No resolved problems to display.       39 y.o. male admitted with Alcohol withdrawal seizure.      08/18/24  Patient is medically stable at this time.  He is  decisional.  Denies any HI or SI.  Spoke with his wife at bedside.  She is very concerned of him being discharged as she is worried about his alcohol use.  I explained that he is of sound mind to make his own medical decisions and I cannot force him to stay here against his will.  I agree that alcohol cessation and continued treatment would be the best in terms of his long-term overall health, however he is declining inpatient treatment. He had a 72hr hold earlier this hospitalization due to inability to make complex medical decisions at the time given severe alcohol withdrawal. He is out of withdrawal window and now medically stable to make his own decisions.     Alcohol Dependence in Withdrawal with Seizure  - finished phenobarbital, no ativan in >24hrs  - continue vitamin replacement   - follow electrolytes and replace as needed  - finished short term course of keppra as recommended by neurology  - appreciate neurology recs and critical care management from [facility]     Type 2 DM  - blood glucose acceptable  - continue lantus 10 units nightly  - cover with ssi/hypoglycemia protocol     HTN  - BP acceptable  - continue current regimen     Alcoholic Cirrhosis  - compensated, has secondary thrombocytopenia  - holding lasix and aldactone for now     Hypothyroidism  - continue levothyroxine    Acute on chronic hyponatremia  - due to EtOH cirrhosis  - follows with Dr Rodriges  - 131 (8/18)  - remains on fluid restriction, high protein diet    Flow (L/min):  [2] 2    DVT prophylaxis: Lovenox  Discussed with patient and nursing staff.  Anticipated discharge home, 1-2 days          Addendum  -Evaluated by physical therapy and they are recommending SNF.  At this time patient is interested and we will work with CCP to try to arrange discharge options.'    Luiz Corey MD  [facility]  08/18/24  10:32 EDT

## 2024-08-18 NOTE — PLAN OF CARE
Goal Outcome Evaluation:  Plan of Care Reviewed With: patient, spouse        Progress: improving     Pt is a 39 y.o. male admitted to [facility] for ETHO abuse on 8/13/2024. Patient was admitted from rehab where he was there for ~ 12 hours before being admitted to [facility]. Per patient/spouse, he resides in 2 story home with all needs on second floor with his spouse/children with 3STE. Patient was IADLs and denies AD use prior to admission. Pt sitting EOB upon arrival and required SBA for STS transfers, and Nirmala for ambulation with RWx for 15 ft around bed to chair before walking an additional 15ft back to bed. During second bout of ambulation, patient became light-headed and request to quickly sit. After approximately 5 mins he returned baseline and was transferred back to bed.Pt impulsive throughout session leading to decreased safety with transfers, unsteadiness/ataxic gait, below baseline strength/endurance, and decreased functional mobility.  Pt will benefit from skilled PT to address the previous deficits and improve overall safety with functional mobility. Anticipate pt will D/C to rehab pending progress.      Anticipated Discharge Disposition (PT): inpatient rehabilitation facility

## 2024-08-18 NOTE — THERAPY EVALUATION
Patient Name: Anthony eLrma  : 1985    MRN: [id number]                              Today's Date: 2024       Admit Date: 2024    Visit Dx:     ICD-10-CM ICD-9-CM   1. Alcohol withdrawal seizure without complication  F10.930 291.81    R56.9    2. Hypokalemia  E87.6 276.8   3. Hypomagnesemia  E83.42 275.2   4. Atrial fibrillation with rapid ventricular response  I48.91 427.31     Patient Active Problem List   Diagnosis    ETOH abuse    Type 2 diabetes mellitus    Essential hypertension    Alcoholic cirrhosis of liver with ascites    Hypothyroidism (acquired)     Past Medical History:   Diagnosis Date    Alcohol abuse with withdrawal     Diabetes mellitus     Seizure      History reviewed. No pertinent surgical history.   General Information       Row Name 24 1328          Physical Therapy Time and Intention    Document Type evaluation  -     Mode of Treatment individual therapy;physical therapy  -       Row Name 24 1328          General Information    Patient Profile Reviewed yes  -JS     Prior Level of Function independent:;all household mobility;community mobility;gait;transfer;ADL's;bed mobility  -     Existing Precautions/Restrictions fall  -     Barriers to Rehab none identified  -       Row Name 24 1328          Living Environment    People in Home spouse;child(joshua), dependent  -       Row Name 24 1328          Home Main Entrance    Number of Stairs, Main Entrance three  -       Row Name 24 1328          Stairs Within Home, Primary    Number of Stairs, Within Home, Primary twelve  -JS     Stairs Comment, Within Home, Primary two story home, all needs on second floor  -       Row Name 24 1328          Cognition    Orientation Status (Cognition) oriented x 3  -       Row Name 24 1328          Safety Issues, Functional Mobility    Safety Issues Affecting Function (Mobility) ability to follow commands;at risk behavior observed;awareness  of need for assistance;impulsivity;insight into deficits/self-awareness;judgment;positioning of assistive device;problem-solving;safety precautions follow-through/compliance;sequencing abilities  -     Impairments Affecting Function (Mobility) balance;coordination;endurance/activity tolerance;motor planning;pain;strength  -     Comment, Safety Issues/Impairments (Mobility) non-skid socks, gait belt worn throughout session  -               User Key  (r) = Recorded By, (t) = Taken By, (c) = Cosigned By      Initials Name Provider Type    Gaby Michael, PT Physical Therapist                   Mobility       Row Name 08/18/24 1330          Bed Mobility    Bed Mobility sit-supine  -JS     Sit-Supine [place] (Bed Mobility) standby assist  -     Assistive Device (Bed Mobility) head of bed elevated  -     Comment, (Bed Mobility) patient sitting EOB upon arrival  -       Row Name 08/18/24 1330          Transfers    Comment, (Transfers) cues for safety and hand placement  -       Row Name 08/18/24 1330          Bed-Chair Transfer    Bed-Chair [place] (Transfers) minimum assist (75% patient effort);1 person assist  -     Assistive Device (Bed-Chair Transfers) walker, front-wheeled  -JS     Comment, (Bed-Chair Transfer) cues for RWx navigation  -       Row Name 08/18/24 1330          Sit-Stand Transfer    Sit-Stand [place] (Transfers) standby assist;minimum assist (75% patient effort);1 person assist  -JS     Assistive Device (Sit-Stand Transfers) walker, front-wheeled  -JS     Comment, (Sit-Stand Transfer) cues for hand placement prior to standing  -       Row Name 08/18/24 1330          Gait/Stairs (Locomotion)    [place] Level (Gait) minimum assist (75% patient effort);1 person assist  -JS     Assistive Device (Gait) walker, front-wheeled  -JS     Distance in Feet (Gait) 15  15 feet x 2  -JS     Deviations/Abnormal Patterns (Gait) ataxic;bilateral deviations;base of support,  wide;gait speed decreased  -JS     Bilateral Gait Deviations forward flexed posture;heel strike decreased  -JS     [place] Level (Stairs) not tested  -JS     Comment, (Gait/Stairs) unsteadiness with all standind tasks, inc tactile support with RWx navigation and cues for straight path ambualtion. No true LOB  -JS               User Key  (r) = Recorded By, (t) = Taken By, (c) = Cosigned By      Initials Name Provider Type    JS Gaby Foster, PT Physical Therapist                   Obj/Interventions       Row Name 08/18/24 1332          Range of Motion Comprehensive    General Range of Motion no range of motion deficits identified  -JS       Row Name 08/18/24 1332          Strength Comprehensive (MMT)    Comment, General Manual Muscle Testing (MMT) Assessment generalized weakness compared to baseline  -JS       Row Name 08/18/24 1332          Motor Skills    Therapeutic Exercise other (see comments)  AP/LAQ/ standing marches x 10  -JS       Row Name 08/18/24 1332          Balance    Balance Assessment sitting static balance;sitting dynamic balance;sit to stand dynamic balance;standing static balance;standing dynamic balance  -JS     Static Sitting Balance modified independence  -JS     Dynamic Sitting Balance modified independence  -JS     Position, Sitting Balance unsupported;sitting edge of bed  -JS     Static Standing Balance standby assist  -JS     Dynamic Standing Balance contact guard;minimal assist  -JS     Position/Device Used, Standing Balance walker, front-wheeled  -JS     Comment, Balance unseadiness with ambulation and need for external support for safety  -JS               User Key  (r) = Recorded By, (t) = Taken By, (c) = Cosigned By      Initials Name Provider Type    JS Gaby oFster, PT Physical Therapist                   Goals/Plan       Row Name 08/18/24 1336          Bed Mobility Goal 1 (PT)    Activity/Assistive Device (Bed Mobility Goal 1, PT) bed mobility activities, all  -JS      [place] Level/Cues Needed (Bed Mobility Goal 1, PT) independent  -JS     Time Frame (Bed Mobility Goal 1, PT) 5 days  -JS       Row Name 08/18/24 1336          Transfer Goal 1 (PT)    Activity/Assistive Device (Transfer Goal 1, PT) sit-to-stand/stand-to-sit;bed-to-chair/chair-to-bed  -JS     [place] Level/Cues Needed (Transfer Goal 1, PT) standby assist  -JS     Time Frame (Transfer Goal 1, PT) 5 days  -JS       Row Name 08/18/24 1336          Gait Training Goal 1 (PT)    Activity/Assistive Device (Gait Training Goal 1, PT) gait (walking locomotion)  -JS     [place] Level (Gait Training Goal 1, PT) standby assist  -JS     Distance (Gait Training Goal 1, PT) 150  -JS     Time Frame (Gait Training Goal 1, PT) 5 days  -JS       Row Name 08/18/24 1336          Stairs Goal 1 (PT)    Activity/Assistive Device (Stairs Goal 1, PT) stairs, all skills  -JS     [place] Level/Cues Needed (Stairs Goal 1, PT) contact guard required  -JS     Number of Stairs (Stairs Goal 1, PT) 10  -JS     Time Frame (Stairs Goal 1, PT) 5 days  -JS       Row Name 08/18/24 1336          Therapy Assessment/Plan (PT)    Planned Therapy Interventions (PT) balance training;bed mobility training;gait training;home exercise program;neuromuscular re-education;patient/family education;postural re-education;stair training;strengthening;transfer training  -JS               User Key  (r) = Recorded By, (t) = Taken By, (c) = Cosigned By      Initials Name Provider Type    Gaby Michael, PT Physical Therapist                   Clinical Impression       Row Name 08/18/24 1333          Pain    Pretreatment Pain Rating 0/10 - no pain  -JS     Posttreatment Pain Rating 0/10 - no pain  -JS       Row Name 08/18/24 1333          Plan of Care Review    Plan of Care Reviewed With patient;spouse  -JS     Progress improving  -JS       Row Name 08/18/24 1333          Therapy Assessment/Plan (PT)    Rehab Potential (PT) good, to achieve stated  therapy goals  -JS     Criteria for Skilled Interventions Met (PT) yes;meets criteria;skilled treatment is necessary  -JS     Therapy Frequency (PT) 6 times/wk  -JS       Row Name 08/18/24 1333          Vital Signs    O2 Delivery Pre Treatment room air  -JS     O2 Delivery Intra Treatment room air  -JS     O2 Delivery Post Treatment room air  -JS     Pre Patient Position Sitting  -JS     Intra Patient Position Standing  -JS     Post Patient Position Supine  -JS       Row Name 08/18/24 1333          Positioning and Restraints    Pre-Treatment Position other (comment)  sitting EOB  -JS     Post Treatment Position bed  -JS     In Bed notified nsg;supine;exit alarm on;with family/caregiver;call light within reach;encouraged to call for assist  -JS               User Key  (r) = Recorded By, (t) = Taken By, (c) = Cosigned By      Initials Name Provider Type    Gaby Michael, PT Physical Therapist                   Outcome Measures       Row Name 08/18/24 1337 08/18/24 0803       How much help from another person do you currently need...    Turning from your back to your side while in flat bed without using bedrails? 4  -JS 4  -MB    Moving from lying on back to sitting on the side of a flat bed without bedrails? 4  -JS 4  -MB    Moving to and from a bed to a chair (including a wheelchair)? 3  -JS 4  -MB    Standing up from a chair using your arms (e.g., wheelchair, bedside chair)? 4  -JS 4  -MB    Climbing 3-5 steps with a railing? 2  -JS 3  -MB    To walk in hospital room? 3  -JS 3  -MB    AM-PAC 6 Clicks Score (PT) 20  -JS 22  -MB    Highest Level of Mobility Goal 6 --> Walk 10 steps or more  -JS 7 --> Walk 25 feet or more  -MB      Row Name 08/18/24 1337          Functional Assessment    Outcome Measure Options AM-PAC 6 Clicks Basic Mobility (PT)  -JS               User Key  (r) = Recorded By, (t) = Taken By, (c) = Cosigned By      Initials Name Provider Type    Gaby Michael, TIESHA Physical Therapist    MB  Garo Doss, RN Registered Nurse                                   PT Recommendation and Plan  Planned Therapy Interventions (PT): balance training, bed mobility training, gait training, home exercise program, neuromuscular re-education, patient/family education, postural re-education, stair training, strengthening, transfer training  Plan of Care Reviewed With: patient, spouse  Progress: improving     Time Calculation:         PT Charges       Row Name 08/18/24 1341             Time Calculation    Start Time 1305  -JS      Stop Time 1335  -JS      Time Calculation (min) 30 min  -JS      PT Received On 08/18/24  -CIARRA      PT - Next Appointment 08/19/24  -CIARRA      PT Goal Re-Cert Due Date 08/23/24  -CIARRA                User Key  (r) = Recorded By, (t) = Taken By, (c) = Cosigned By      Initials Name Provider Type    Gaby Michael, PT Physical Therapist                  Therapy Charges for Today       Code Description Service Date Service Provider Modifiers Qty    [id number]  PT EVAL MOD COMPLEXITY 3 8/18/2024 Gaby Foster, PT GP 1    [id number]  PT THERAPEUTIC ACT EA 15 MIN 8/18/2024 Gaby Foster PT GP 1            PT G-Codes  Outcome Measure Options: AM-PAC 6 Clicks Basic Mobility (PT)  AM-PAC 6 Clicks Score (PT): 20  PT Discharge Summary  Anticipated Discharge Disposition (PT): inpatient rehabilitation facility    Gaby Foster PT  8/18/2024

## 2024-08-18 NOTE — PROGRESS NOTES
"[facility] Note.    Updated chart entries reviewed. Spoke w/primary RN.     Met w/Pt in room #449. On approach found Pt sitting on side of bed w/wife at bedside. With Pt's permission, wife remained in room during f/u.    With 10 being most, Pt rated his depression \"5\", anxiety \"10\" & craving for EtOH \"0\". He reported he slept well & generally feels good. He noted he is still a bit physically unsteady on his feet.     Pt expressed a willingness to return to [facility] due to wife's statement that he can not return home. Wife echoed the same that \"he is not allowed to come home\" without doing JOE tx. Wife said that she caught Pt lying about drinking and that she would not tolerate him being verbally belligerent to her due to intoxication. Answered questions about additional JOE tx programs in the area. Provided contact info for all.    Encouraged Pt to go directly to a program upon discharge from hospital for assessment. Explained that one program will do a phone assessment & if accepted they will pick Pt up from hospital at discharge. Pt & spouse verbalized understanding. Encouraged spouse to seek support from [facility].    Upon leaving room, spouse approached this writer w/additional concerns. She expressed feeling pt is not mentally ready for discharge. Wife stated that Pt is struggling to remember that he is currently on leave from job & legally not allowed to drive before Sept. 30, 2024. When wife reminds Pt, he acts surprised and s/w frustrated about it. Wife is worried that Pt will make a bad decision & drive anyway. Offered supportive listening to spouse. Additionally educated spouse on what may & may not be realistic expectations of inpt hospital admission. Pt again expressed frustration about the process. Spouse has little support & noted her enabling role. Again encouraged spouse to obtain support for self & noted that [facility] will continue following.      Spent more than 65 minutes in direct contact & chart " review w/Pt & spouse.

## 2024-08-18 NOTE — PLAN OF CARE
Problem: Restraint, Nonviolent  Goal: Absence of Harm or Injury  Outcome: Ongoing, Progressing  Intervention: Implement Least Restrictive Safety Strategies  Recent Flowsheet Documentation  Taken 8/17/2024 2100 by Clara Tyson RN  Diversional Activities: television  Intervention: Protect Dignity, Rights, and Personal Wellbeing  Recent Flowsheet Documentation  Taken 8/17/2024 2100 by Clara Tyson RN  Trust Relationship/Rapport: care explained  Intervention: Protect Skin and Joint Integrity  Recent Flowsheet Documentation  Taken 8/18/2024 0222 by Clara Tyson RN  Body Position: position changed independently  Taken 8/18/2024 0000 by Clara Tyson RN  Body Position: position changed independently  Taken 8/17/2024 2200 by Clara Tyson RN  Body Position: position changed independently  Taken 8/17/2024 2100 by Clara Tyson RN  Range of Motion: active ROM (range of motion) encouraged  Taken 8/17/2024 2000 by Clara Tyson RN  Body Position: position changed independently     Problem: Skin Injury Risk Increased  Goal: Skin Health and Integrity  Outcome: Ongoing, Progressing  Intervention: Optimize Skin Protection  Recent Flowsheet Documentation  Taken 8/18/2024 0222 by Clara Tyson RN  Head of Bed (HOB) Positioning: HOB at 20-30 degrees  Taken 8/18/2024 0000 by Clara Tyson RN  Head of Bed (HOB) Positioning: HOB at 20-30 degrees  Taken 8/17/2024 2200 by Clara Tyson RN  Head of Bed (HOB) Positioning: HOB at 20-30 degrees  Taken 8/17/2024 2100 by Clara Tyson RN  Pressure Reduction Techniques: frequent weight shift encouraged  Pressure Reduction Devices: positioning supports utilized  Skin Protection:   adhesive use limited   tubing/devices free from skin contact  Taken 8/17/2024 2000 by Clara Tyson RN  Head of Bed (HOB) Positioning: HOB at 20-30 degrees     Problem: Fall Injury Risk  Goal: Absence of Fall and Fall-Related Injury  Outcome: Ongoing, Progressing  Intervention: Identify and  Manage Contributors  Recent Flowsheet Documentation  Taken 8/18/2024 0222 by Clara Tyson RN  Medication Review/Management: medications reviewed  Taken 8/18/2024 0000 by Clara Tyson RN  Medication Review/Management: medications reviewed  Taken 8/17/2024 2200 by Clara Tyson RN  Medication Review/Management: medications reviewed  Taken 8/17/2024 2100 by Clara Tyson RN  Medication Review/Management: medications reviewed  Taken 8/17/2024 2000 by Clara Tyson RN  Medication Review/Management: medications reviewed  Intervention: Promote Injury-Free Environment  Recent Flowsheet Documentation  Taken 8/18/2024 0222 by Clara Tyson RN  Safety Promotion/Fall Prevention:   activity supervised   fall prevention program maintained   assistive device/personal items within reach   gait belt   mobility aid in reach   nonskid shoes/slippers when out of bed   patient off unit  Taken 8/18/2024 0000 by Clara Tyson RN  Safety Promotion/Fall Prevention:   activity supervised   fall prevention program maintained   assistive device/personal items within reach   gait belt   mobility aid in reach   nonskid shoes/slippers when out of bed   patient off unit  Taken 8/17/2024 2200 by Clara Tyson RN  Safety Promotion/Fall Prevention:   activity supervised   fall prevention program maintained   assistive device/personal items within reach   gait belt   mobility aid in reach   nonskid shoes/slippers when out of bed   patient off unit  Taken 8/17/2024 2100 by Clara Tyson RN  Safety Promotion/Fall Prevention:   activity supervised   fall prevention program maintained   safety round/check completed   nonskid shoes/slippers when out of bed   mobility aid in reach  Taken 8/17/2024 2000 by Clara Tyson RN  Safety Promotion/Fall Prevention:   activity supervised   fall prevention program maintained   assistive device/personal items within reach   gait belt   mobility aid in reach   nonskid shoes/slippers when out of  bed   patient off unit     Problem: Seizure Disorder Comorbidity  Goal: Maintenance of Seizure Control  Intervention: Maintain Seizure-Symptom Control  Recent Flowsheet Documentation  Taken 8/18/2024 0222 by Clara Tyson RN  Seizure Precautions:   activity supervised   clutter-free environment maintained   side rails padded   soft boundaries provided  Taken 8/18/2024 0000 by Clara Tyson RN  Seizure Precautions:   activity supervised   clutter-free environment maintained   side rails padded   soft boundaries provided  Taken 8/17/2024 2200 by Clara Tyson RN  Seizure Precautions:   activity supervised   clutter-free environment maintained   side rails padded   soft boundaries provided  Taken 8/17/2024 2000 by Clara Tyson RN  Seizure Precautions:   activity supervised   clutter-free environment maintained   side rails padded   soft boundaries provided   Goal Outcome Evaluation:  Plan of Care Reviewed With: patient, spouse  MEGHAN 4, pt states he's at [place]. Otherwise AAO compliant, follows commands. Sitter At bsd. Pt amb to [facility] with gait belt and SBA. Seizure, fall, safety precautions in place, patient 72 hour hold lifted. Wife anxious and concerned about safety of pt if discharged tmrw. She is requesting to speak with provider and case management for guidance and assistance with discharge inpatient therapy, she prefers pt is sent back to [facility]. Wife misunderstood 72 hour hold. She thought it would extend through weekend, pt's hold was in place as of 8/13 and ended on 8/16/24 at 1030 am. Wife encouraged to call and/or come to hospital if available to speak with rounding provider, recommended by on call LC MINOR . Wife reassured that oncoming nurse will be updated on concerns. Notes documented for Provider.     Progress: improving

## 2024-08-19 LAB
ALBUMIN SERPL-MCNC: 3.1 G/DL (ref 3.5–5.2)
ALBUMIN/GLOB SERPL: 1 G/DL
ALP SERPL-CCNC: 85 U/L (ref 39–117)
ALT SERPL W P-5'-P-CCNC: 39 U/L (ref 1–41)
ANION GAP SERPL CALCULATED.3IONS-SCNC: 9.6 MMOL/L (ref 5–15)
AST SERPL-CCNC: 62 U/L (ref 1–40)
BASOPHILS # BLD AUTO: 0.06 10*3/MM3 (ref 0–0.2)
BASOPHILS NFR BLD AUTO: 1.3 % (ref 0–1.5)
BILIRUB SERPL-MCNC: 0.7 MG/DL (ref 0–1.2)
BUN SERPL-MCNC: 11 MG/DL (ref 6–20)
BUN/CREAT SERPL: 13.3 (ref 7–25)
CALCIUM SPEC-SCNC: 8.6 MG/DL (ref 8.6–10.5)
CHLORIDE SERPL-SCNC: 102 MMOL/L (ref 98–107)
CO2 SERPL-SCNC: 25.4 MMOL/L (ref 22–29)
CREAT SERPL-MCNC: 0.83 MG/DL (ref 0.76–1.27)
DEPRECATED RDW RBC AUTO: 43.1 FL (ref 37–54)
EGFRCR SERPLBLD CKD-EPI 2021: 113.5 ML/MIN/1.73
EOSINOPHIL # BLD AUTO: 0.15 10*3/MM3 (ref 0–0.4)
EOSINOPHIL NFR BLD AUTO: 3.3 % (ref 0.3–6.2)
ERYTHROCYTE [DISTWIDTH] IN BLOOD BY AUTOMATED COUNT: 12.2 % (ref 12.3–15.4)
GLOBULIN UR ELPH-MCNC: 3.1 GM/DL
GLUCOSE BLDC GLUCOMTR-MCNC: 117 MG/DL (ref 70–130)
GLUCOSE BLDC GLUCOMTR-MCNC: 152 MG/DL (ref 70–130)
GLUCOSE BLDC GLUCOMTR-MCNC: 161 MG/DL (ref 70–130)
GLUCOSE BLDC GLUCOMTR-MCNC: 168 MG/DL (ref 70–130)
GLUCOSE SERPL-MCNC: 112 MG/DL (ref 65–99)
HCT VFR BLD AUTO: 39.9 % (ref 37.5–51)
HGB BLD-MCNC: 13.8 G/DL (ref 13–17.7)
IMM GRANULOCYTES # BLD AUTO: 0.04 10*3/MM3 (ref 0–0.05)
IMM GRANULOCYTES NFR BLD AUTO: 0.9 % (ref 0–0.5)
LYMPHOCYTES # BLD AUTO: 0.86 10*3/MM3 (ref 0.7–3.1)
LYMPHOCYTES NFR BLD AUTO: 19.2 % (ref 19.6–45.3)
MAGNESIUM SERPL-MCNC: 2 MG/DL (ref 1.6–2.6)
MCH RBC QN AUTO: 33.6 PG (ref 26.6–33)
MCHC RBC AUTO-ENTMCNC: 34.6 G/DL (ref 31.5–35.7)
MCV RBC AUTO: 97.1 FL (ref 79–97)
MONOCYTES # BLD AUTO: 0.82 10*3/MM3 (ref 0.1–0.9)
MONOCYTES NFR BLD AUTO: 18.3 % (ref 5–12)
NEUTROPHILS NFR BLD AUTO: 2.55 10*3/MM3 (ref 1.7–7)
NEUTROPHILS NFR BLD AUTO: 57 % (ref 42.7–76)
NRBC BLD AUTO-RTO: 0 /100 WBC (ref 0–0.2)
PHOSPHATE SERPL-MCNC: 2.4 MG/DL (ref 2.5–4.5)
PLATELET # BLD AUTO: 137 10*3/MM3 (ref 140–450)
PMV BLD AUTO: 10.7 FL (ref 6–12)
POTASSIUM SERPL-SCNC: 4.1 MMOL/L (ref 3.5–5.2)
PROT SERPL-MCNC: 6.2 G/DL (ref 6–8.5)
RBC # BLD AUTO: 4.11 10*6/MM3 (ref 4.14–5.8)
SODIUM SERPL-SCNC: 137 MMOL/L (ref 136–145)
WBC NRBC COR # BLD AUTO: 4.48 10*3/MM3 (ref 3.4–10.8)

## 2024-08-19 PROCEDURE — 84100 ASSAY OF PHOSPHORUS: CPT | Performed by: INTERNAL MEDICINE

## 2024-08-19 PROCEDURE — 93010 ELECTROCARDIOGRAM REPORT: CPT | Performed by: INTERNAL MEDICINE

## 2024-08-19 PROCEDURE — 83735 ASSAY OF MAGNESIUM: CPT | Performed by: INTERNAL MEDICINE

## 2024-08-19 PROCEDURE — 63710000001 INSULIN GLARGINE PER 5 UNITS: Performed by: STUDENT IN AN ORGANIZED HEALTH CARE EDUCATION/TRAINING PROGRAM

## 2024-08-19 PROCEDURE — 97116 GAIT TRAINING THERAPY: CPT

## 2024-08-19 PROCEDURE — 63710000001 INSULIN LISPRO (HUMAN) PER 5 UNITS: Performed by: STUDENT IN AN ORGANIZED HEALTH CARE EDUCATION/TRAINING PROGRAM

## 2024-08-19 PROCEDURE — 25010000002 ENOXAPARIN PER 10 MG: Performed by: STUDENT IN AN ORGANIZED HEALTH CARE EDUCATION/TRAINING PROGRAM

## 2024-08-19 PROCEDURE — 85025 COMPLETE CBC W/AUTO DIFF WBC: CPT | Performed by: INTERNAL MEDICINE

## 2024-08-19 PROCEDURE — 82948 REAGENT STRIP/BLOOD GLUCOSE: CPT

## 2024-08-19 PROCEDURE — 93005 ELECTROCARDIOGRAM TRACING: CPT | Performed by: INTERNAL MEDICINE

## 2024-08-19 PROCEDURE — 80053 COMPREHEN METABOLIC PANEL: CPT | Performed by: INTERNAL MEDICINE

## 2024-08-19 RX ADMIN — INSULIN LISPRO 2 UNITS: 100 INJECTION, SOLUTION INTRAVENOUS; SUBCUTANEOUS at 12:17

## 2024-08-19 RX ADMIN — Medication 1 TABLET: at 08:37

## 2024-08-19 RX ADMIN — ENOXAPARIN SODIUM 40 MG: 100 INJECTION SUBCUTANEOUS at 08:37

## 2024-08-19 RX ADMIN — PREGABALIN 300 MG: 100 CAPSULE ORAL at 21:25

## 2024-08-19 RX ADMIN — FOLIC ACID 1 MG: 1 TABLET ORAL at 08:37

## 2024-08-19 RX ADMIN — PREGABALIN 300 MG: 100 CAPSULE ORAL at 08:37

## 2024-08-19 RX ADMIN — INSULIN GLARGINE 10 UNITS: 100 INJECTION, SOLUTION SUBCUTANEOUS at 21:24

## 2024-08-19 RX ADMIN — NICOTINE 1 PATCH: 21 PATCH, EXTENDED RELEASE TRANSDERMAL at 12:14

## 2024-08-19 RX ADMIN — INSULIN LISPRO 2 UNITS: 100 INJECTION, SOLUTION INTRAVENOUS; SUBCUTANEOUS at 17:18

## 2024-08-19 RX ADMIN — Medication 100 MG: at 08:37

## 2024-08-19 RX ADMIN — LEVOTHYROXINE SODIUM 112 MCG: 112 TABLET ORAL at 08:37

## 2024-08-19 RX ADMIN — INSULIN LISPRO 2 UNITS: 100 INJECTION, SOLUTION INTRAVENOUS; SUBCUTANEOUS at 08:37

## 2024-08-19 NOTE — THERAPY TREATMENT NOTE
Patient Name: Anthony Lerma  : 1985    MRN: [id number]                              Today's Date: 2024       Admit Date: 2024    Visit Dx:     ICD-10-CM ICD-9-CM   1. Alcohol withdrawal seizure without complication  F10.930 291.81    R56.9    2. Hypokalemia  E87.6 276.8   3. Hypomagnesemia  E83.42 275.2   4. Atrial fibrillation with rapid ventricular response  I48.91 427.31     Patient Active Problem List   Diagnosis    ETOH abuse    Type 2 diabetes mellitus    Essential hypertension    Alcoholic cirrhosis of liver with ascites    Hypothyroidism (acquired)     Past Medical History:   Diagnosis Date    Alcohol abuse with withdrawal     Diabetes mellitus     Seizure      History reviewed. No pertinent surgical history.   General Information       Row Name 24 1559          Physical Therapy Time and Intention    Document Type therapy note (daily note)  -EB     Mode of Treatment individual therapy;physical therapy  -EB       Row Name 24 6252          General Information    Patient Profile Reviewed yes  -EB     Existing Precautions/Restrictions fall  -EB       Row Name 24 3654          Cognition    Orientation Status (Cognition) oriented x 4  -EB       Row Name 24 7684          Safety Issues, Functional Mobility    Safety Issues Affecting Function (Mobility) impulsivity  -EB     Impairments Affecting Function (Mobility) endurance/activity tolerance;strength;balance  -EB               User Key  (r) = Recorded By, (t) = Taken By, (c) = Cosigned By      Initials Name Provider Type    EB Tyra Jang PTA Physical Therapist Assistant                   Mobility       Row Name 24 7045          Bed Mobility    Comment, (Bed Mobility) NT-sitting EOB  -EB       Row Name 24 8852          Sit-Stand Transfer    Sit-Stand Swan (Transfers) independent  -EB     Assistive Device (Sit-Stand Transfers) --  no AD  -EB       Row Name 24 1559          Gait/Stairs  (Locomotion)    Leon Level (Gait) standby assist;supervision  -EB     Assistive Device (Gait) --  no AD  -EB     Distance in Feet (Gait) 220  -EB     Deviations/Abnormal Patterns (Gait) gait speed decreased;stride length decreased  -EB     Bilateral Gait Deviations heel strike decreased  -EB     Comment, (Gait/Stairs) No LOB. midly unsteady at times.  -EB               User Key  (r) = Recorded By, (t) = Taken By, (c) = Cosigned By      Initials Name Provider Type    Tyra Sanford PTA Physical Therapist Assistant                   Obj/Interventions    No documentation.                  Goals/Plan    No documentation.                  Clinical Impression       Row Name 08/19/24 6996          Plan of Care Review    Plan of Care Reviewed With patient  -EB     Progress improving  -EB     Outcome Evaluation Pt seen for PT tx today. Pt sitting on EOB when PT arrived. Pt is independent to stand from EOB. Pt ambulated 220ft without AD, SBA/supervision. Pt fairly steady, no LOB. Discussed with pt about d/c plans as pt is not appropriate for SNF or IRF and no skilled PT is necessary. Pt wants to d/c back to [facility]. PT will sign off.  -EB       Row Name 08/19/24 5483          Therapy Assessment/Plan (PT)    Therapy Frequency (PT) 6 times/wk  -EB       Row Name 08/19/24 7864          Positioning and Restraints    Pre-Treatment Position in bed  -EB     Post Treatment Position bed  -EB     In Bed sitting EOB;call light within reach;encouraged to call for assist  -EB               User Key  (r) = Recorded By, (t) = Taken By, (c) = Cosigned By      Initials Name Provider Type    Tyra Sanford PTA Physical Therapist Assistant                   Outcome Measures       Row Name 08/19/24 1601          How much help from another person do you currently need...    Turning from your back to your side while in flat bed without using bedrails? 4  -EB     Moving from lying on back to sitting on the side of a flat bed  without bedrails? 4  -EB     Moving to and from a bed to a chair (including a wheelchair)? 4  -EB     Standing up from a chair using your arms (e.g., wheelchair, bedside chair)? 4  -EB     Climbing 3-5 steps with a railing? 3  -EB     To walk in hospital room? 3  -EB     AM-PAC 6 Clicks Score (PT) 22  -EB     Highest Level of Mobility Goal 7 --> Walk 25 feet or more  -EB               User Key  (r) = Recorded By, (t) = Taken By, (c) = Cosigned By      Initials Name Provider Type    Tyra Sanford PTA Physical Therapist Assistant                                   PT Recommendation and Plan     Plan of Care Reviewed With: patient  Progress: improving  Outcome Evaluation: Pt seen for PT tx today. Pt sitting on EOB when PT arrived. Pt is independent to stand from EOB. Pt ambulated 220ft without AD, SBA/supervision. Pt fairly steady, no LOB. Discussed with pt about d/c plans as pt is not appropriate for SNF or IRF and no skilled PT is necessary. Pt wants to d/c back to the [facility]. PT will sign off.     Time Calculation:         PT Charges       Row Name 08/19/24 1602             Time Calculation    Start Time 1523  -EB      Stop Time 1532  -EB      Time Calculation (min) 9 min  -EB      PT Received On 08/19/24  -EB         Time Calculation- PT    Total Timed Code Minutes- PT 9 minute(s)  -                User Key  (r) = Recorded By, (t) = Taken By, (c) = Cosigned By      Initials Name Provider Type    Tyra Sanford PTA Physical Therapist Assistant                  Therapy Charges for Today       Code Description Service Date Service Provider Modifiers Qty    07672480619 HC GAIT TRAINING EA 15 MIN 8/19/2024 Tyra Jang PTA GP 1            PT G-Codes  Outcome Measure Options: AM-PAC 6 Clicks Basic Mobility (PT)  AM-PAC 6 Clicks Score (PT): 22  PT Discharge Summary  Anticipated Discharge Disposition (PT): home    Tyra Jang PTA  8/19/2024

## 2024-08-19 NOTE — NURSING NOTE
After reviewing chart from PT, still concerning about pt in the room w/o bed alarm, pt refused at shift change. This RN went back to pt's room and talked about bed alarm again, mentioned concern for light-headed and seizure precaution, pt still insisted to refuse bed alarm.

## 2024-08-19 NOTE — PROGRESS NOTES
Name: Anthony Lerma ADMIT: 2024   : 1985  PCP: Provider, No Known    MRN: [id number] LOS: 6 days   AGE/SEX: 39 y.o. male  ROOM: [facility]     Subjective   Subjective   Patient is seen at bedside, no new complaints.       Objective   Objective   Vital Signs  Temp:  [98.2 °F (36.8 °C)-99.1 °F (37.3 °C)] 98.2 °F (36.8 °C)  Heart Rate:  [] 93  Resp:  [16-20] 16  BP: (116-131)/(75-82) 116/81  SpO2:  [94 %-99 %] 94 %  on   ;   Device (Oxygen Therapy): room air  Body mass index is 30.65 kg/m².  Physical Exam  General, awake and alert.  Head and ENT, normocephalic and atraumatic.  Lungs, symmetric expansion, equal air entry bilaterally.  Heart, regular rate and rhythm.  Abdomen, soft and nontender.  Extremities, no clubbing or cyanosis.  Neuro, no focal deficits.  Skin: Warm and no rash.  Psych, normal mood and affect.  Musculoskeletal, joint examination is grossly normal.    Copied text material from yesterday's note has been reviewed for appropriate changes and remains accurate as of 24.      Results Review     I reviewed the patient's new clinical results.  Results from last 7 days   Lab Units 24  0351 24  0314 24  0500 24  0320   WBC 10*3/mm3 4.48 5.93 7.04 6.97   HEMOGLOBIN g/dL 13.8 13.7 14.1 14.2   PLATELETS 10*3/mm3 137* 134* 129* 108*     Results from last 7 days   Lab Units 24  0351 24  0314 24  0500 24  0320   SODIUM mmol/L 137 131* 137 131*   POTASSIUM mmol/L 4.1 3.9 3.7 3.9   CHLORIDE mmol/L 102 99 101 100   CO2 mmol/L 25.4 22.0 18.8* 16.3*   BUN mg/dL 11 13 17 14   CREATININE mg/dL 0.83 0.83 0.88 0.89   GLUCOSE mg/dL 112* 170* 112* 131*   EGFR mL/min/1.73 113.5 113.5 111.5 111.1     Results from last 7 days   Lab Units 24  0351 24  0314 24  0500 24  0320   ALBUMIN g/dL 3.1* 3.2* 3.4* 3.4*   BILIRUBIN mg/dL 0.7 0.6 0.8 0.9   ALK PHOS U/L 85 94 96 97   AST (SGOT) U/L 62* 36 49* 55*   ALT (SGPT) U/L 39 29 34 37      Results from last 7 days   Lab Units 08/19/24  0351 08/18/24  0314 08/17/24  0500 08/16/24  0320   CALCIUM mg/dL 8.6 8.2* 8.2* 8.2*   ALBUMIN g/dL 3.1* 3.2* 3.4* 3.4*   MAGNESIUM mg/dL 2.0 2.0 2.2 2.1   PHOSPHORUS mg/dL 2.4* 2.4* 2.4* 2.6       Glucose   Date/Time Value Ref Range Status   08/19/2024 1642 152 (H) 70 - 130 mg/dL Final   08/19/2024 1158 161 (H) 70 - 130 mg/dL Final   08/19/2024 0742 168 (H) 70 - 130 mg/dL Final   08/18/2024 2040 157 (H) 70 - 130 mg/dL Final   08/18/2024 1711 168 (H) 70 - 130 mg/dL Final   08/18/2024 1210 198 (H) 70 - 130 mg/dL Final   08/18/2024 0733 200 (H) 70 - 130 mg/dL Final       No radiology results for the last day    I have personally reviewed all medications:  Scheduled Medications  [Held by provider] amitriptyline, 10 mg, Oral, Nightly  enoxaparin, 40 mg, Subcutaneous, Daily  folic acid, 1 mg, Oral, Daily  [Held by provider] furosemide, 20 mg, Oral, Daily  insulin glargine, 10 Units, Subcutaneous, Nightly  insulin lispro, 2-9 Units, Subcutaneous, 4x Daily AC & at Bedtime  levothyroxine, 112 mcg, Oral, Daily  multivitamin with minerals, 1 tablet, Oral, Daily  nicotine, 1 patch, Transdermal, Q24H  pregabalin, 300 mg, Oral, BID  sodium chloride, 10 mL, Intravenous, Q12H  [Held by provider] spironolactone, 50 mg, Oral, Daily  thiamine, 100 mg, Oral, Daily    Infusions   Diet  Diet: Diabetic, Fluid Restriction (240 mL/tray); Consistent Carbohydrate; Other (Specify mL/day) (1200 mL/day); Fluid Consistency: Thin (IDDSI 0)    I have personally reviewed:  [x]  Laboratory   [x]  Microbiology   [x]  Radiology   [x]  EKG/Telemetry  [x]  Cardiology/Vascular   []  Pathology    []  Records       Assessment/Plan     Active Hospital Problems    Diagnosis  POA    Type 2 diabetes mellitus [E11.9]  Yes    Essential hypertension [I10]  Yes    Alcoholic cirrhosis of liver with ascites [K70.31]  Yes    Hypothyroidism (acquired) [E03.9]  Yes    ETOH abuse [F10.10]  Yes      Resolved Hospital  Problems    Diagnosis Date Resolved POA    **Alcohol withdrawal seizure [F10.939, R56.9] 08/18/2024 Yes       39 y.o. male admitted with Alcohol withdrawal seizure.    Patient is medically stable at this time.  He is decisional.  Denies any HI or SI.  Spoke with his wife at bedside.  She is very concerned of him being discharged as she is worried about his alcohol use.  I explained that he is of sound mind to make his own medical decisions and I cannot force him to stay here against his will.  I agree that alcohol cessation and continued treatment would be the best in terms of his long-term overall health, however he is declining inpatient treatment. He had a 72hr hold earlier this hospitalization due to inability to make complex medical decisions at the time given severe alcohol withdrawal. He is out of withdrawal window and now medically stable to make his own decisions.      Alcohol Dependence in Withdrawal with Seizure  - finished phenobarbital, no ativan in >24hrs  - continue vitamin replacement   - follow electrolytes and replace as needed  - finished short term course of keppra as recommended by neurology  - appreciate neurology recs and critical care management from [facility]     Type 2 DM  - blood glucose acceptable  - continue lantus 10 units nightly  - cover with ssi/hypoglycemia protocol     HTN  - BP acceptable  - continue current regimen     Alcoholic Cirrhosis  - compensated, has secondary thrombocytopenia  - holding lasix and aldactone for now     Hypothyroidism  - continue levothyroxine     Acute on chronic hyponatremia  - due to EtOH cirrhosis  - follows with Dr Rodriges  - 131 (8/18)  - remains on fluid restriction, high protein diet     Flow (L/min):  [2] 2     DVT prophylaxis: Lovenox  Discussed with patient and nursing staff.  Anticipated discharge home, 1-2 days           Addendum  -Evaluated by physical therapy and they are recommending SNF.  At this time patient is interested and we will work with  CCP to try to arrange discharge options.      Honorio Moralez MD  [facility]  08/19/24  17:36 EDT

## 2024-08-19 NOTE — PROGRESS NOTES
"[facility] f/u d/t ETOH. Chart reviewed and spoke w/ primary RN who inquired around pt's transfer to [facility]. Informed RN that [facility] does not arrange transfers for patients who are pursuing JOE tx and that he would be responsible for arranging transportation for an initial assessment at [facility].     Pt in room alone upon entry. Introduced self and role. Pt denies current depression and anxiety, but stating \"I'm ready to go.\" Denies SI/HI/AVH. Reports no issues w/ sleep or appetite. CIWA has been discontinued. Pt denies current withdrawal sxs or ETOH cravings. Previously given JOE resources and spouse encouraged to attend [facility]. Pt reports that he believes PT is recommending physical rehab following d/c. Informed pt that an initial assessment through [facility] residential JOE tx program would follow his completion of physical rehab. Pt requested for clarification around his d/c plan since he isn't sure what physical rehab program he will be transferring to.     Discussed situation w/ primary RN who reviewed chart and confirmed PT is recommending physical rehab. Primary RN states that she will communicate this w/ CCP for d/c planning. Updated pt on discussion w/ RN and current plan. Pt appreciative and denies further needs/questions/concerns. [facility] following.   "

## 2024-08-19 NOTE — PROGRESS NOTES
PROGRESS NOTE      Patient Name: Anthony Lerma  : 1985  MRN: [id number]  Primary Care Physician: Provider, Laisha Known  Date of admission: 2024    Patient Care Team:  Provider, Laisha Known as PCP - General        Reason for Follow up     Established patient, prior LOVELY, chronic hyponatremia      Subjective     Seen and examined, no distress  Na 137, sCr 0.8    Review of systems:  Constitutional: weakness.  HEENT:  No headache, otalgia, itchy eyes, nasal discharge or sore throat.  Cardiac:  No chest pain, dyspnea, orthopnea or PND.  Chest:              No cough, phlegm or wheezing.  Abdomen:  No abdominal pain, nausea or vomiting.  Neuro:  No focal weakness, abnormal movements orseizure like activity.  :   No hematuria, no pyuria, no dysuria, no flank pain.  ROS was otherwise negative except as mentioned in the [place].       Personal History:     Past Medical History:   Past Medical History:   Diagnosis Date    Alcohol abuse with withdrawal     Diabetes mellitus     Seizure        Surgical History:    History reviewed. No pertinent surgical history.    Family History: family history is not on file. Otherwise pertinent FHx was reviewed and unremarkable.     Social History:  reports that he has been smoking cigarettes. He does not have any smokeless tobacco history on file. He reports current alcohol use. He reports that he does not use drugs.    Medications:  Prior to Admission medications    Medication Sig Start Date End Date Taking? Authorizing Provider   atorvastatin (LIPITOR) 20 MG tablet Take 1 tablet by mouth Daily.   Yes Lionel Cade MD   Continuous Glucose Sensor (Dexcom G7 Sensor) misc APPLY ONE SENSOR TOPICALLY EVERY 10 DAYS 24  Yes Lionel Cade MD   Cyanocobalamin 1000 MCG/ML kit Inject 1 mL as directed Every 30 (Thirty) Days.   Yes Lionel Cade MD   furosemide (LASIX) 20 MG tablet Take 1 tablet by mouth Daily. 24  Yes Lionel Cade MD   insulin aspart  (NovoLOG FlexPen) 100 UNIT/ML solution pen-injector sc pen 5 UNITS BEFORE SUPPER (IF B/G GREATER THAN 200 USE 5 UNITS FOR EACH 50MG ABOVE 200, MAX 50 UNITS DAILY. 7/31/24  Yes Lionel Cade MD Januvia 50 MG tablet Take 1 tablet by mouth Daily. 8/5/24  Yes Lionel Cade MD Lantus SoloStar 100 UNIT/ML injection pen INJECT 8 TO 12 UNITS SUBCUTANEOUSLY ONCE DAILY   Yes Lionel Cade MD   levothyroxine (SYNTHROID, LEVOTHROID) 112 MCG tablet Take 1 tablet by mouth Daily. 6/19/24  Yes Lionel Cade MD   nortriptyline (PAMELOR) 10 MG capsule Take 1 capsule by mouth Every Night. 7/30/24  Yes Lionel Cade MD   pregabalin (LYRICA) 300 MG capsule Take 1 capsule by mouth 2 (Two) Times a Day.   Yes Lionel Cade MD   spironolactone (ALDACTONE) 50 MG tablet Take 1 tablet by mouth Daily. 7/28/24  Yes Lionel Cade MD   vitamin D (ERGOCALCIFEROL) 1.25 MG (50627 UT) capsule capsule Take 1 capsule by mouth Every 30 (Thirty) Days.   Yes Lionel Cade MD     Scheduled Meds:[Held by provider] amitriptyline, 10 mg, Oral, Nightly  enoxaparin, 40 mg, Subcutaneous, Daily  folic acid, 1 mg, Oral, Daily  [Held by provider] furosemide, 20 mg, Oral, Daily  insulin glargine, 10 Units, Subcutaneous, Nightly  insulin lispro, 2-9 Units, Subcutaneous, 4x Daily AC & at Bedtime  levothyroxine, 112 mcg, Oral, Daily  multivitamin with minerals, 1 tablet, Oral, Daily  nicotine, 1 patch, Transdermal, Q24H  pregabalin, 300 mg, Oral, BID  sodium chloride, 10 mL, Intravenous, Q12H  [Held by provider] spironolactone, 50 mg, Oral, Daily  thiamine, 100 mg, Oral, Daily      Continuous Infusions:   PRN Meds:  acetaminophen    senna-docusate sodium **AND** polyethylene glycol **AND** bisacodyl **AND** bisacodyl    Calcium Replacement - Follow Nurse / BPA Driven Protocol    cloNIDine    dextrose    dextrose    glucagon (human recombinant)    Magnesium Standard Dose Replacement - Follow Nurse / BPA  Driven Protocol    Magnesium Standard Dose Replacement - Follow Nurse / BPA Driven Protocol    nicotine polacrilex    nitroglycerin    nitroglycerin    ondansetron ODT **OR** ondansetron    Phosphorus Replacement - Follow Nurse / BPA Driven Protocol    Potassium Replacement - Follow Nurse / BPA Driven Protocol    Potassium Replacement - Follow Nurse / BPA Driven Protocol    [COMPLETED] Insert Peripheral IV **AND** sodium chloride    sodium chloride    sodium chloride  Allergies:  No Known Allergies    Objective   Exam:     Vital Signs  Temp:  [98.4 °F (36.9 °C)-99.1 °F (37.3 °C)] 98.8 °F (37.1 °C)  Heart Rate:  [] 105  Resp:  [17-20] 17  BP: (117-131)/(75-82) 117/82  SpO2:  [96 %-99 %] 97 %  on   ;   Device (Oxygen Therapy): room air  Body mass index is 30.65 kg/m².  EXAM  General:  Alert, no distress   Head:      Normocephalic and atraumatic.    Eyes:      PERRL/EOM intact, conjunctivae and sclerae clear without nystagmus.    Neck:      No masses, thyromegaly,  trachea central   Lungs:    Clear bilaterally to auscultation.    Heart:      Regular rate and rhythm, no murmur no gallop  Abd:        Soft, nontender, not distended, bowel sounds positive, no shifting dullness.  Msk:        No deformity or scoliosis noted of thoracic or lumbar spine.    Pulses:   Pulses normal in all 4 extremities.    Extremities:        No cyanosis or clubbing--no edema.    Neuro:    No focal deficits.   alert oriented x3  Skin:       Intact without lesions or rashes.    Psych:   Calm    Results Review:  I have personally reviewed most recent Data :  BMP @LABRCNT(creatinine:10)  CBC    Results from last 7 days   Lab Units 08/19/24  0351 08/18/24  0314 08/17/24  0500 08/16/24  0320 08/15/24  0351 08/14/24  0311 08/13/24  0307   WBC 10*3/mm3 4.48 5.93 7.04 6.97 5.74 5.17 6.11   HEMOGLOBIN g/dL 13.8 13.7 14.1 14.2 14.3 14.7 15.1   PLATELETS 10*3/mm3 137* 134* 129* 108* 75* 60* 57*     CMP   Results from last 7 days   Lab Units  08/19/24  0351 08/18/24  0314 08/17/24  0500 08/16/24  0320 08/15/24  0351 08/14/24  0311 08/13/24  1344 08/13/24  0307   SODIUM mmol/L 137 131* 137 131* 134* 137 132* 134*   POTASSIUM mmol/L 4.1 3.9 3.7 3.9 4.0 3.9 4.4 3.4*   CHLORIDE mmol/L 102 99 101 100 102 102 99 94*   CO2 mmol/L 25.4 22.0 18.8* 16.3* 16.8* 21.7* 23.4 22.0   BUN mg/dL 11 13 17 14 12 9 10 11   CREATININE mg/dL 0.83 0.83 0.88 0.89 0.82 0.66* 0.80 0.82   GLUCOSE mg/dL 112* 170* 112* 131* 114* 115* 160* 215*   ALBUMIN g/dL 3.1* 3.2* 3.4* 3.4* 3.3* 3.4*  --  3.7   BILIRUBIN mg/dL 0.7 0.6 0.8 0.9 1.1 1.1  --  1.2   ALK PHOS U/L 85 94 96 97 90 81  --  98   AST (SGOT) U/L 62* 36 49* 55* 56* 54*  --  84*   ALT (SGPT) U/L 39 29 34 37 40 44*  --  63*     ABG      CT Cervical Spine Without Contrast    Result Date: 8/13/2024  Electronically signed by Álvaro Rutledge MD on 08-13-24 at 0515    CT Head Without Contrast    Result Date: 8/13/2024  Electronically signed by Álvaro Rutledge MD on 08-13-24 at 0514           Assessment & Plan   Assessment and Plan:         ETOH abuse    Type 2 diabetes mellitus    Essential hypertension    Alcoholic cirrhosis of liver with ascites    Hypothyroidism (acquired)    ASSESSMENT:  Chronic hyponatremia  NAGMA  Alcohol withdrawal  Seizure  Alcoholic cirrhosis  DM2  TCP      PLAN :     Patient admitted with seizure r/t alcohol withdrawal  Follows with Dr Venkat Rodriges outpatient for previous LOVELY, chronic hyponatremia in the setting of alcoholic cirrhosis  Sodium 134 on admission, now 137  1.2L/day fluid restriction, encourage high protein diet  K, Mag acceptable, Phos on low side: will give Kphos IV x 2  Renal function intact, UA with ketones, proteinuria, hematuria, UPCR 1.8g  Monitor BMP daily  NAGMA, now resolved, likely with component of respiratory alkalosis in patient with cirrhosis, pH compensated  Receiving IV thiamine  BP trends labile, monitor  H&H stable but with TCP likely 2/2 cirrhosis, monitor  Strict  I&O's  Avoid nephrotoxic medications including NSAIDs  Stable from renal standpoint    Note transcribed for MILY Schmid  [facility]  8/19/2024  12:17 EDT    I have seen the pt and reviewed chart and confirmed the accuracy    as documented by the APRN and agree with the plans.     Dennis Jaimes MD      [facility]

## 2024-08-19 NOTE — NURSING NOTE
Talked with pt multiple times about fall risk and bed alarm, still refusing. CTU called and notified this RN that pt has been having frequent PVCs/ Bigeminy since last night. Made Dr. Moralez aware, ordered Cardiology consult. Pt has been asymptomatic. EKG ordered.

## 2024-08-19 NOTE — PLAN OF CARE
Goal Outcome Evaluation:  Plan of Care Reviewed With: patient        Progress: improving  Outcome Evaluation: PT re-eval today. Cardiology consulted due to frequent PVCs/ Bigems. VSS, stable on room air. Refused bed alarm. Wife called and got updates on the phone. Call light within reach.

## 2024-08-19 NOTE — SIGNIFICANT NOTE
08/19/24 1603   PT Discharge Summary   Anticipated Discharge Disposition (PT) home   Reason for Discharge Independent   Outcomes Achieved Able to achieve all goals within established timeline   Discharge Destination Home

## 2024-08-19 NOTE — DISCHARGE PLACEMENT REQUEST
"Anthony Lerma (39 y.o. Male)       Date of Birth   1985    Social Security Number       Address   [address] [place] [address]    Home Phone   [phone]    MRN   [id number]       Worship   Unknown    Marital Status                               Admission Date   8/13/24    Admission Type   Emergency    Admitting Provider   Hayden Sol MD    Attending Provider   Honorio Moralez MD    Department, Room/Bed   [address], [address]/1       Discharge Date       Discharge Disposition       Discharge Destination                                 Attending Provider: Honorio Moralez MD    Allergies: No Known Allergies    Isolation: None   Infection: None   Code Status: CPR    Ht: 177.8 cm (70\")   Wt: 96.9 kg (213 lb 9.6 oz)    Admission Cmt: PT CAME FROM THE [facility]   Principal Problem: Alcohol withdrawal seizure [F10.939,R56.9]                   Active Insurance as of 8/13/2024       Primary Coverage       Payor Plan Insurance Group Employer/Plan Group    [employer]CIAL AET[employer] [id number]       Payor Plan Address Payor Plan Phone Number Payor Plan Fax Number Effective Dates    [address] [phone]  10/16/2022 - None Entered    [facility] [id number]         Subscriber Name Subscriber Birth Date Member ID       ANTHONY LERMA 1985 [id number]                     Emergency Contacts        (Rel.) Home Phone Work Phone Mobile Phone    MARIA GUADALUPEBISHOP (Spouse) -- -- [phone]    Maria GuadalupePaige (Mother) -- -- [phone]                "

## 2024-08-19 NOTE — PLAN OF CARE
Goal Outcome Evaluation:  Plan of Care Reviewed With: patient        Progress: improving  Outcome Evaluation: Pt seen for PT tx today. Pt sitting on EOB when PT arrived. Pt is independent to stand from EOB. Pt ambulated 220ft without AD, SBA/supervision. Pt fairly steady, no LOB. Discussed with pt about d/c plans as pt is not appropriate for SNF or IRF and no skilled PT is necessary. Pt wants to d/c back to the [facility]. PT will sign off.      Anticipated Discharge Disposition (PT): home

## 2024-08-19 NOTE — PLAN OF CARE
Goal Outcome Evaluation:           Progress: improving  Outcome Evaluation: VSSafebrile. Assumed care of pt. after 0200. No complaints of pain, CIWA's discontinued. Pt maintained FR so far this shift. Plan of care ongoing.

## 2024-08-19 NOTE — CASE MANAGEMENT/SOCIAL WORK
Continued Stay Note  [facility]     Patient Name: Anthony Lerma  MRN: [id number]  Today's Date: 8/19/2024    Admit Date: 8/13/2024    Plan: SNF, referrals pending. Will need [employer] pre-cert approval.   Discharge Plan       Row Name 08/19/24 1515       Plan    Plan SNF, referrals pending. Will need [employer] pre-cert approval.    Plan Comments CCP met with the patient at bedside to discuss discharge planning. CCP explained that therapy is recommending SNF at discharge. Patient is agreeable to SNF at discharge. CCP confirmed the patient's [place] is [address]. CCP put in current zip code into Medicare.gov website and discussed local SNF options. Patient is agreeable to all SNF referrals and is willing to go to any facility that can accept him. Patient will need [employer] pre-cert once SNF facility accepts. CCP to follow. CD, CSW.                   Discharge Codes    No documentation.                 Expected Discharge Date and Time       Expected Discharge Date Expected Discharge Time    Aug 19, 2024

## 2024-08-20 ENCOUNTER — READMISSION MANAGEMENT (OUTPATIENT)
Dept: CALL CENTER | Facility: HOSPITAL | Age: 39
End: 2024-08-20
Payer: COMMERCIAL

## 2024-08-20 VITALS
BODY MASS INDEX: 30.91 KG/M2 | HEART RATE: 88 BPM | WEIGHT: 215.9 LBS | OXYGEN SATURATION: 98 % | TEMPERATURE: 98.2 F | RESPIRATION RATE: 17 BRPM | SYSTOLIC BLOOD PRESSURE: 117 MMHG | DIASTOLIC BLOOD PRESSURE: 78 MMHG | HEIGHT: 70 IN

## 2024-08-20 LAB
ALBUMIN SERPL-MCNC: 3.3 G/DL (ref 3.5–5.2)
ALBUMIN/GLOB SERPL: 1.1 G/DL
ALP SERPL-CCNC: 93 U/L (ref 39–117)
ALT SERPL W P-5'-P-CCNC: 56 U/L (ref 1–41)
ANION GAP SERPL CALCULATED.3IONS-SCNC: 7 MMOL/L (ref 5–15)
AST SERPL-CCNC: 70 U/L (ref 1–40)
BASOPHILS # BLD MANUAL: 0.1 10*3/MM3 (ref 0–0.2)
BASOPHILS NFR BLD MANUAL: 2 % (ref 0–1.5)
BILIRUB SERPL-MCNC: 0.6 MG/DL (ref 0–1.2)
BUN SERPL-MCNC: 10 MG/DL (ref 6–20)
BUN/CREAT SERPL: 12.7 (ref 7–25)
CALCIUM SPEC-SCNC: 9 MG/DL (ref 8.6–10.5)
CHLORIDE SERPL-SCNC: 101 MMOL/L (ref 98–107)
CO2 SERPL-SCNC: 27 MMOL/L (ref 22–29)
CREAT SERPL-MCNC: 0.79 MG/DL (ref 0.76–1.27)
DEPRECATED RDW RBC AUTO: 42.2 FL (ref 37–54)
EGFRCR SERPLBLD CKD-EPI 2021: 115.2 ML/MIN/1.73
ERYTHROCYTE [DISTWIDTH] IN BLOOD BY AUTOMATED COUNT: 11.8 % (ref 12.3–15.4)
GLOBULIN UR ELPH-MCNC: 2.9 GM/DL
GLUCOSE BLDC GLUCOMTR-MCNC: 138 MG/DL (ref 70–130)
GLUCOSE BLDC GLUCOMTR-MCNC: 148 MG/DL (ref 70–130)
GLUCOSE SERPL-MCNC: 142 MG/DL (ref 65–99)
HCT VFR BLD AUTO: 39.4 % (ref 37.5–51)
HGB BLD-MCNC: 13.6 G/DL (ref 13–17.7)
LYMPHOCYTES # BLD MANUAL: 1.21 10*3/MM3 (ref 0.7–3.1)
LYMPHOCYTES NFR BLD MANUAL: 16 % (ref 5–12)
MAGNESIUM SERPL-MCNC: 1.9 MG/DL (ref 1.6–2.6)
MCH RBC QN AUTO: 33.5 PG (ref 26.6–33)
MCHC RBC AUTO-ENTMCNC: 34.5 G/DL (ref 31.5–35.7)
MCV RBC AUTO: 97 FL (ref 79–97)
METAMYELOCYTES NFR BLD MANUAL: 1 % (ref 0–0)
MONOCYTES # BLD: 0.77 10*3/MM3 (ref 0.1–0.9)
MYELOCYTES NFR BLD MANUAL: 1 % (ref 0–0)
NEUTROPHILS # BLD AUTO: 2.66 10*3/MM3 (ref 1.7–7)
NEUTROPHILS NFR BLD MANUAL: 55 % (ref 42.7–76)
PHOSPHATE SERPL-MCNC: 3.2 MG/DL (ref 2.5–4.5)
PLAT MORPH BLD: NORMAL
PLATELET # BLD AUTO: 155 10*3/MM3 (ref 140–450)
PMV BLD AUTO: 11 FL (ref 6–12)
POTASSIUM SERPL-SCNC: 4.1 MMOL/L (ref 3.5–5.2)
PROT SERPL-MCNC: 6.2 G/DL (ref 6–8.5)
QT INTERVAL: 408 MS
QTC INTERVAL: 453 MS
RBC # BLD AUTO: 4.06 10*6/MM3 (ref 4.14–5.8)
RBC MORPH BLD: NORMAL
SODIUM SERPL-SCNC: 135 MMOL/L (ref 136–145)
VARIANT LYMPHS NFR BLD MANUAL: 25 % (ref 19.6–45.3)
WBC MORPH BLD: NORMAL
WBC NRBC COR # BLD AUTO: 4.83 10*3/MM3 (ref 3.4–10.8)

## 2024-08-20 PROCEDURE — 85007 BL SMEAR W/DIFF WBC COUNT: CPT | Performed by: INTERNAL MEDICINE

## 2024-08-20 PROCEDURE — 99222 1ST HOSP IP/OBS MODERATE 55: CPT | Performed by: INTERNAL MEDICINE

## 2024-08-20 PROCEDURE — 84100 ASSAY OF PHOSPHORUS: CPT | Performed by: INTERNAL MEDICINE

## 2024-08-20 PROCEDURE — 85025 COMPLETE CBC W/AUTO DIFF WBC: CPT | Performed by: INTERNAL MEDICINE

## 2024-08-20 PROCEDURE — 82948 REAGENT STRIP/BLOOD GLUCOSE: CPT

## 2024-08-20 PROCEDURE — 25010000002 ENOXAPARIN PER 10 MG: Performed by: STUDENT IN AN ORGANIZED HEALTH CARE EDUCATION/TRAINING PROGRAM

## 2024-08-20 PROCEDURE — 80053 COMPREHEN METABOLIC PANEL: CPT | Performed by: INTERNAL MEDICINE

## 2024-08-20 PROCEDURE — 83735 ASSAY OF MAGNESIUM: CPT | Performed by: INTERNAL MEDICINE

## 2024-08-20 RX ADMIN — NICOTINE 1 PATCH: 21 PATCH, EXTENDED RELEASE TRANSDERMAL at 11:10

## 2024-08-20 RX ADMIN — Medication 1 TABLET: at 08:35

## 2024-08-20 RX ADMIN — LEVOTHYROXINE SODIUM 112 MCG: 112 TABLET ORAL at 08:35

## 2024-08-20 RX ADMIN — ENOXAPARIN SODIUM 40 MG: 100 INJECTION SUBCUTANEOUS at 08:35

## 2024-08-20 RX ADMIN — FOLIC ACID 1 MG: 1 TABLET ORAL at 08:35

## 2024-08-20 RX ADMIN — PREGABALIN 300 MG: 100 CAPSULE ORAL at 11:10

## 2024-08-20 RX ADMIN — Medication 100 MG: at 08:34

## 2024-08-20 NOTE — PROGRESS NOTES
PROGRESS NOTE      Patient Name: Anthony Lerma  : 1985  MRN: [id number]  Primary Care Physician: Jenniffer Shankar APRN  Date of admission: 2024    Patient Care Team:  Jenniffer Shankar APRN as PCP - General (Family Medicine)        Reason for Follow up     Established patient, prior LOVELY, chronic hyponatremia      Subjective     Seen and examined, no distress  Na 135, sCr 0.79    Review of systems:  Constitutional: weakness.  HEENT:  No headache, otalgia, itchy eyes, nasal discharge or sore throat.  Cardiac:  No chest pain, dyspnea, orthopnea or PND.  Chest:              No cough, phlegm or wheezing.  Abdomen:  No abdominal pain, nausea or vomiting.  Neuro:  No focal weakness, abnormal movements orseizure like activity.  :   No hematuria, no pyuria, no dysuria, no flank pain.  ROS was otherwise negative except as mentioned in the [place].       Personal History:     Past Medical History:   Past Medical History:   Diagnosis Date    Alcohol abuse with withdrawal     Diabetes mellitus     Seizure        Surgical History:    History reviewed. No pertinent surgical history.    Family History: family history is not on file. Otherwise pertinent FHx was reviewed and unremarkable.     Social History:  reports that he has been smoking cigarettes. He does not have any smokeless tobacco history on file. He reports current alcohol use. He reports that he does not use drugs.    Medications:  Prior to Admission medications    Medication Sig Start Date End Date Taking? Authorizing Provider   atorvastatin (LIPITOR) 20 MG tablet Take 1 tablet by mouth Daily.   Yes Lionel Cade MD   Continuous Glucose Sensor (Dexcom G7 Sensor) misc APPLY ONE SENSOR TOPICALLY EVERY 10 DAYS 24  Yes Lionel Cade MD   Cyanocobalamin 1000 MCG/ML kit Inject 1 mL as directed Every 30 (Thirty) Days.   Yes Lionel Cade MD   furosemide (LASIX) 20 MG tablet Take 1 tablet by mouth Daily. 24  Yes Rayo  MD Lionel   insulin aspart (NovoLOG FlexPen) 100 UNIT/ML solution pen-injector sc pen 5 UNITS BEFORE SUPPER (IF B/G GREATER THAN 200 USE 5 UNITS FOR EACH 50MG ABOVE 200, MAX 50 UNITS DAILY. 7/31/24  Yes Provider, Historical, MD   Januvia 50 MG tablet Take 1 tablet by mouth Daily. 8/5/24  Yes Lionel Cade MD Lantus SoloStar 100 UNIT/ML injection pen INJECT 8 TO 12 UNITS SUBCUTANEOUSLY ONCE DAILY   Yes Lionel Cade MD   levothyroxine (SYNTHROID, LEVOTHROID) 112 MCG tablet Take 1 tablet by mouth Daily. 6/19/24  Yes Lionel Cade MD   nortriptyline (PAMELOR) 10 MG capsule Take 1 capsule by mouth Every Night. 7/30/24  Yes Lionel Cade MD   pregabalin (LYRICA) 300 MG capsule Take 1 capsule by mouth 2 (Two) Times a Day.   Yes Lionel Cade MD   spironolactone (ALDACTONE) 50 MG tablet Take 1 tablet by mouth Daily. 7/28/24  Yes Lionel Cade MD   vitamin D (ERGOCALCIFEROL) 1.25 MG (03720 UT) capsule capsule Take 1 capsule by mouth Every 30 (Thirty) Days.   Yes Provider, MD Lionel     Scheduled Meds:[Held by provider] amitriptyline, 10 mg, Oral, Nightly  enoxaparin, 40 mg, Subcutaneous, Daily  folic acid, 1 mg, Oral, Daily  [Held by provider] furosemide, 20 mg, Oral, Daily  insulin glargine, 10 Units, Subcutaneous, Nightly  insulin lispro, 2-9 Units, Subcutaneous, 4x Daily AC & at Bedtime  levothyroxine, 112 mcg, Oral, Daily  multivitamin with minerals, 1 tablet, Oral, Daily  nicotine, 1 patch, Transdermal, Q24H  pregabalin, 300 mg, Oral, BID  sodium chloride, 10 mL, Intravenous, Q12H  [Held by provider] spironolactone, 50 mg, Oral, Daily  thiamine, 100 mg, Oral, Daily      Continuous Infusions:   PRN Meds:  acetaminophen    senna-docusate sodium **AND** polyethylene glycol **AND** bisacodyl **AND** bisacodyl    Calcium Replacement - Follow Nurse / BPA Driven Protocol    cloNIDine    dextrose    dextrose    glucagon (human recombinant)    Magnesium Standard Dose  Replacement - Follow Nurse / BPA Driven Protocol    Magnesium Standard Dose Replacement - Follow Nurse / BPA Driven Protocol    nicotine polacrilex    nitroglycerin    nitroglycerin    ondansetron ODT **OR** ondansetron    Phosphorus Replacement - Follow Nurse / BPA Driven Protocol    Potassium Replacement - Follow Nurse / BPA Driven Protocol    Potassium Replacement - Follow Nurse / BPA Driven Protocol    [COMPLETED] Insert Peripheral IV **AND** sodium chloride    sodium chloride    sodium chloride  Allergies:  No Known Allergies    Objective   Exam:     Vital Signs  Temp:  [98.2 °F (36.8 °C)-99.1 °F (37.3 °C)] 98.2 °F (36.8 °C)  Heart Rate:  [83-90] 88  Resp:  [17] 17  BP: (117-130)/(78-86) 117/78  SpO2:  [98 %-99 %] 98 %  on  Flow (L/min):  [2] 2;   Device (Oxygen Therapy): room air  Body mass index is 30.98 kg/m².  EXAM  General:  Alert, no distress   Head:      Normocephalic and atraumatic.    Eyes:      PERRL/EOM intact, conjunctivae and sclerae clear without nystagmus.    Neck:      No masses, thyromegaly,  trachea central   Lungs:    Clear bilaterally to auscultation.    Heart:      Regular rate and rhythm, no murmur no gallop  Abd:        Soft, nontender, not distended, bowel sounds positive, no shifting dullness.  Msk:        No deformity or scoliosis noted of thoracic or lumbar spine.    Pulses:   Pulses normal in all 4 extremities.    Extremities:        No cyanosis or clubbing--no edema.    Neuro:    No focal deficits.   alert oriented x3  Skin:       Intact without lesions or rashes.    Psych:   Calm    Results Review:  I have personally reviewed most recent Data :  BMP @LABRCNTIP(creatinine:10)  CBC    Results from last 7 days   Lab Units 08/20/24  0528 08/19/24  0351 08/18/24  0314 08/17/24  0500 08/16/24  0320 08/15/24  0351 08/14/24  0311   WBC 10*3/mm3 4.83 4.48 5.93 7.04 6.97 5.74 5.17   HEMOGLOBIN g/dL 13.6 13.8 13.7 14.1 14.2 14.3 14.7   PLATELETS 10*3/mm3 155 137* 134* 129* 108* 75* 60*      CMP   Results from last 7 days   Lab Units 08/20/24  0527 08/19/24  0351 08/18/24  0314 08/17/24  0500 08/16/24  0320 08/15/24  0351 08/14/24  0311   SODIUM mmol/L 135* 137 131* 137 131* 134* 137   POTASSIUM mmol/L 4.1 4.1 3.9 3.7 3.9 4.0 3.9   CHLORIDE mmol/L 101 102 99 101 100 102 102   CO2 mmol/L 27.0 25.4 22.0 18.8* 16.3* 16.8* 21.7*   BUN mg/dL 10 11 13 17 14 12 9   CREATININE mg/dL 0.79 0.83 0.83 0.88 0.89 0.82 0.66*   GLUCOSE mg/dL 142* 112* 170* 112* 131* 114* 115*   ALBUMIN g/dL 3.3* 3.1* 3.2* 3.4* 3.4* 3.3* 3.4*   BILIRUBIN mg/dL 0.6 0.7 0.6 0.8 0.9 1.1 1.1   ALK PHOS U/L 93 85 94 96 97 90 81   AST (SGOT) U/L 70* 62* 36 49* 55* 56* 54*   ALT (SGPT) U/L 56* 39 29 34 37 40 44*     ABG      CT Cervical Spine Without Contrast    Result Date: 8/13/2024  Electronically signed by Álvaro Rutledge MD on 08-13-24 at 0515    CT Head Without Contrast    Result Date: 8/13/2024  Electronically signed by Álvaro Rutledge MD on 08-13-24 at 0514           Assessment & Plan   Assessment and Plan:         ETOH abuse    Type 2 diabetes mellitus    Essential hypertension    Alcoholic cirrhosis of liver with ascites    Hypothyroidism (acquired)    ASSESSMENT:  Chronic hyponatremia  NAGMA  Alcohol withdrawal  Seizure  Alcoholic cirrhosis  DM2  TCP      PLAN :     Patient admitted with seizure r/t alcohol withdrawal  Follows with Dr Venkat Rodriges outpatient for previous LOVELY, chronic hyponatremia in the setting of alcoholic cirrhosis  Sodium 134 on admission, now 135  1.2L/day fluid restriction, encourage high protein diet  K, Mag, Phos acceptable  Renal function intact, UA with ketones, proteinuria, hematuria, UPCR 1.8g  Monitor BMP daily  NAGMA, now resolved, likely with component of respiratory alkalosis in patient with cirrhosis, pH compensated  Receiving IV thiamine  BP trends labile, monitor  H&H stable but with TCP likely 2/2 cirrhosis, monitor  Strict I&O's  Avoid nephrotoxic medications including NSAIDs  Stable from  renal standpoint    Note transcribed for [facility]  8/20/2024  13:18 EDT    I have seen the pt and reviewed chart and confirmed the accuracy    as documented by the APRN and agree with the plans.     [facility]

## 2024-08-20 NOTE — CASE MANAGEMENT/SOCIAL WORK
Case Management Discharge Note      Final Note: Home with wife. Family transport. Plan to follow up with [facility] regarding JOE treatment.         Selected Continued Care - Discharged on 8/20/2024 Admission date: 8/13/2024 - Discharge disposition: Home or Self Care      Destination    No services have been selected for the patient.                Durable Medical Equipment    No services have been selected for the patient.                Dialysis/Infusion    No services have been selected for the patient.                Home Medical Care    No services have been selected for the patient.                Therapy    No services have been selected for the patient.                Community Resources    No services have been selected for the patient.                Community & DME    No services have been selected for the patient.                    Transportation Services  Private: Car    Final Discharge Disposition Code: 01 - home or self-care

## 2024-08-20 NOTE — PAYOR COMM NOTE
"Anthony Lerma (39 y.o. Male)        PLEASE SEE ATTACHED FOR CONTINUED STAY REVIEW. - I'M NOW THE UR NURSE FOLLOWING THIS CASE.   I RECEIVED A FAX STATING DENIED 8/15 FORWARD DUE TO NO CLNS.  I AM FAXING THIS CLNS AT THIS TIME.    HAVE DAYS 8/13- 8/14/24  BEEN APPROVED??     REF#[id number]    PLEASE CALL  OR  [phone]    THANK YOU    TULIO YODER LPN [facility]     Date of Birth   1985    Social Security Number       Address   [address]  [place] [address]    Home Phone   [phone]    MRN   [id number]       Yazdanism   Unknown    Marital Status                               Admission Date   8/13/24    Admission Type   Emergency    Admitting Provider   Hayden Sol MD    Attending Provider   Honoroi Moralez MD    Department, Room/Bed   [address], E448/1       Discharge Date       Discharge Disposition       Discharge Destination                                 Attending Provider: Honorio Moralez MD    Allergies: No Known Allergies    Isolation: None   Infection: None   Code Status: CPR    Ht: 177.8 cm (70\")   Wt: 97.9 kg (215 lb 14.4 oz)    Admission Cmt: PT CAME FROM THE [facility]   Principal Problem: Alcohol withdrawal seizure [F10.939,R56.9]                   Active Insurance as of 8/13/2024       Primary Coverage       Payor Plan Insurance Group Employer/Plan Group    [employer]ET[employer] COMMERCIAL AETNA [id number]       Payor Plan Address Payor Plan Phone Number Payor Plan Fax Number Effective Dates    [address] [phone]  10/16/2022 - None Entered    [facility] [id number]         Subscriber Name Subscriber Birth Date Member ID       ANTHONY LERMA 1985 [id number]                     Emergency Contacts        (Rel.) Home Phone Work Phone Mobile Phone    BISHOP LERMA (Spouse) -- -- [phone]    Paige Lerma (Mother) -- -- [phone]              Oxygen Therapy (last 5 days)       Date/Time SpO2 Device " (Oxygen Therapy) Flow (L/min) Oxygen Concentration (%) ETCO2 (mmHg)    08/20/24 0834 -- room air -- -- --    08/20/24 0726 98 room air -- -- --    08/20/24 0400 -- nasal cannula 2 -- --    08/19/24 2343 99 room air -- -- --    08/19/24 2000 -- room air -- -- --    08/19/24 1840 99 room air -- -- --    08/19/24 1300 94 -- -- -- --    08/19/24 1215 -- room air -- -- --    08/19/24 0735 -- room air -- -- --    08/19/24 0721 97 room air -- -- --    08/19/24 0032 96 room air -- -- --    08/18/24 2014 99 room air -- -- --    08/18/24 1803 -- room air -- -- --    08/18/24 1100 99 room air -- -- --    08/18/24 0803 -- nasal cannula 2 -- --    08/18/24 0705 96 nasal cannula 2 -- --    08/18/24 0439 -- nasal cannula 2 -- --    08/17/24 2100 -- nasal cannula 2 -- --    08/17/24 1603 98 nasal cannula 2 -- --    08/17/24 1418 -- nasal cannula 2 -- --    08/17/24 1254 97 nasal cannula 2 -- --    08/17/24 0831 98 nasal cannula 2 -- --    08/17/24 0700 98 nasal cannula 4 -- --    08/17/24 0306 96 nasal cannula 4 -- --    08/17/24 0245 78 nasal cannula 4 -- --    08/17/24 0000 -- nasal cannula 2 -- --    08/16/24 2251 97 nasal cannula 4 -- --    08/16/24 2035 -- nasal cannula 4 -- --    08/16/24 1858 97 nasal cannula 4 -- --    08/16/24 1533 97 nasal cannula 4 -- --    08/16/24 1347 -- nasal cannula 2 -- --    08/16/24 1208 95 nasal cannula 2 -- --    08/16/24 0730 94 -- -- -- --    08/16/24 0700 96 -- -- -- --    08/16/24 0500 94 -- -- -- --    08/16/24 0400 95 nasal cannula 2 -- --    08/16/24 0300 92 -- -- -- --    08/16/24 0247 92 -- -- -- --    08/16/24 0200 96 -- -- -- --    08/16/24 0130 96 -- -- -- --    08/16/24 0100 96 -- -- -- --    08/16/24 0028 93 nasal cannula 2 -- --    08/16/24 0000 96 -- -- -- --    08/15/24 2300 96 -- -- -- --    08/15/24 2244 97 -- -- -- --    08/15/24 2200 95 -- -- -- --    08/15/24 2100 98 -- -- -- --    08/15/24 2000 94 nasal cannula 2 -- --    08/15/24 1800 96 -- -- -- --    08/15/24 1700 95  -- -- -- --    08/15/24 1600 96 nasal cannula 2 -- --    08/15/24 1400 92 -- -- -- --    08/15/24 1300 95 -- -- -- --    08/15/24 1200 94 nasal cannula 2 -- --    08/15/24 1100 95 -- -- -- --    08/15/24 1000 93 -- -- -- --    08/15/24 0900 95 -- -- -- --    08/15/24 0800 96 nasal cannula 2 -- --    08/15/24 0700 97 -- -- -- --    08/15/24 0600 98 -- -- -- --    08/15/24 0503 94 -- -- -- --    08/15/24 0500 94 -- -- -- --    08/15/24 0400 96 nasal cannula 2 -- --    08/15/24 0300 95 -- -- -- --    08/15/24 0200 97 -- -- -- --    08/15/24 0100 97 -- -- -- --    08/15/24 0000 94 nasal cannula 2 -- --          Intake & Output (last 5 days)         08/15 0701  0816 0700 16 0701   0700  0701  18 0700 18 0701   0700  0701   0700  0701   0700    P.O. 220.8 1440 7760 678 5374 360    I.V. (mL/kg) 590 (6)  503.1 (5.1)       Total Intake(mL/kg) 810.8 (8.3) 1440 (14.7) 1883.1 (19.3) 831 (8.6) 1080 (11.1) 360 (3.7)    Urine (mL/kg/hr) 0 (0)  500 (0.2) 550 (0.2)      Stool 0  0       Total Output 0  500 550      Net +810.8 +1440 +1383.1 +281 +1080 +360              Urine Unmeasured Occurrence 4 x 4 x 1 x 1 x      Stool Unmeasured Occurrence  1 x 3 x 1 x            Lines, Drains & Airways       Active LDAs       None                  Operative/Procedure Notes (last 5 days)  Notes from 08/15/24 1114 through 24 1114   No notes of this type exist for this encounter.          Physician Progress Notes (last 5 days)        Honorio Moralez MD at 24 [phone]              Name: Anthony Lerma ADMIT: 2024   : 1985  PCP: Provider, No Known    MRN: [id number] LOS: 6 days   AGE/SEX: 39 y.o. male  ROOM: [facility]     Subjective   Subjective   Patient is seen at bedside, no new complaints.      Objective   Objective   Vital Signs  Temp:  [98.2 °F (36.8 °C)-99.1 °F (37.3 °C)] 98.2 °F (36.8 °C)  Heart Rate:  [] 93  Resp:  [16-20] 16  BP: (116-131)/(75-82) 116/81  SpO2:  [94  %-99 %] 94 %  on   ;   Device (Oxygen Therapy): room air  Body mass index is 30.65 kg/m².  Physical Exam  General, awake and alert.  Head and ENT, normocephalic and atraumatic.  Lungs, symmetric expansion, equal air entry bilaterally.  Heart, regular rate and rhythm.  Abdomen, soft and nontender.  Extremities, no clubbing or cyanosis.  Neuro, no focal deficits.  Skin: Warm and no rash.  Psych, normal mood and affect.  Musculoskeletal, joint examination is grossly normal.    Copied text material from yesterday's note has been reviewed for appropriate changes and remains accurate as of 8/19/24.      Results Review     I reviewed the patient's new clinical results.  Results from last 7 days   Lab Units 08/19/24  0351 08/18/24 0314 08/17/24  0500 08/16/24  0320   WBC 10*3/mm3 4.48 5.93 7.04 6.97   HEMOGLOBIN g/dL 13.8 13.7 14.1 14.2   PLATELETS 10*3/mm3 137* 134* 129* 108*     Results from last 7 days   Lab Units 08/19/24  0351 08/18/24 0314 08/17/24  0500 08/16/24  0320   SODIUM mmol/L 137 131* 137 131*   POTASSIUM mmol/L 4.1 3.9 3.7 3.9   CHLORIDE mmol/L 102 99 101 100   CO2 mmol/L 25.4 22.0 18.8* 16.3*   BUN mg/dL 11 13 17 14   CREATININE mg/dL 0.83 0.83 0.88 0.89   GLUCOSE mg/dL 112* 170* 112* 131*   EGFR mL/min/1.73 113.5 113.5 111.5 111.1     Results from last 7 days   Lab Units 08/19/24  0351 08/18/24  0314 08/17/24  0500 08/16/24  0320   ALBUMIN g/dL 3.1* 3.2* 3.4* 3.4*   BILIRUBIN mg/dL 0.7 0.6 0.8 0.9   ALK PHOS U/L 85 94 96 97   AST (SGOT) U/L 62* 36 49* 55*   ALT (SGPT) U/L 39 29 34 37     Results from last 7 days   Lab Units 08/19/24  0351 08/18/24  0314 08/17/24  0500 08/16/24  0320   CALCIUM mg/dL 8.6 8.2* 8.2* 8.2*   ALBUMIN g/dL 3.1* 3.2* 3.4* 3.4*   MAGNESIUM mg/dL 2.0 2.0 2.2 2.1   PHOSPHORUS mg/dL 2.4* 2.4* 2.4* 2.6       Glucose   Date/Time Value Ref Range Status   08/19/2024 1642 152 (H) 70 - 130 mg/dL Final   08/19/2024 1158 161 (H) 70 - 130 mg/dL Final   08/19/2024 0742 168 (H) 70 - 130 mg/dL  Final   08/18/2024 2040 157 (H) 70 - 130 mg/dL Final   08/18/2024 1711 168 (H) 70 - 130 mg/dL Final   08/18/2024 1210 198 (H) 70 - 130 mg/dL Final   08/18/2024 0733 200 (H) 70 - 130 mg/dL Final       No radiology results for the last day    I have personally reviewed all medications:  Scheduled Medications  [Held by provider] amitriptyline, 10 mg, Oral, Nightly  enoxaparin, 40 mg, Subcutaneous, Daily  folic acid, 1 mg, Oral, Daily  [Held by provider] furosemide, 20 mg, Oral, Daily  insulin glargine, 10 Units, Subcutaneous, Nightly  insulin lispro, 2-9 Units, Subcutaneous, 4x Daily AC & at Bedtime  levothyroxine, 112 mcg, Oral, Daily  multivitamin with minerals, 1 tablet, Oral, Daily  nicotine, 1 patch, Transdermal, Q24H  pregabalin, 300 mg, Oral, BID  sodium chloride, 10 mL, Intravenous, Q12H  [Held by provider] spironolactone, 50 mg, Oral, Daily  thiamine, 100 mg, Oral, Daily    Infusions   Diet  Diet: Diabetic, Fluid Restriction (240 mL/tray); Consistent Carbohydrate; Other (Specify mL/day) (1200 mL/day); Fluid Consistency: Thin (IDDSI 0)    I have personally reviewed:  [x]  Laboratory   [x]  Microbiology   [x]  Radiology   [x]  EKG/Telemetry  [x]  Cardiology/Vascular   []  Pathology    []  Records      Assessment/Plan     Active Hospital Problems    Diagnosis  POA    Type 2 diabetes mellitus [E11.9]  Yes    Essential hypertension [I10]  Yes    Alcoholic cirrhosis of liver with ascites [K70.31]  Yes    Hypothyroidism (acquired) [E03.9]  Yes    ETOH abuse [F10.10]  Yes      Resolved Hospital Problems    Diagnosis Date Resolved POA    **Alcohol withdrawal seizure [F10.939, R56.9] 08/18/2024 Yes       39 y.o. male admitted with Alcohol withdrawal seizure.    Patient is medically stable at this time.  He is decisional.  Denies any HI or SI.  Spoke with his wife at bedside.  She is very concerned of him being discharged as she is worried about his alcohol use.  I explained that he is of sound mind to make his own  medical decisions and I cannot force him to stay here against his will.  I agree that alcohol cessation and continued treatment would be the best in terms of his long-term overall health, however he is declining inpatient treatment. He had a 72hr hold earlier this hospitalization due to inability to make complex medical decisions at the time given severe alcohol withdrawal. He is out of withdrawal window and now medically stable to make his own decisions.      Alcohol Dependence in Withdrawal with Seizure  - finished phenobarbital, no ativan in >24hrs  - continue vitamin replacement   - follow electrolytes and replace as needed  - finished short term course of keppra as recommended by neurology  - appreciate neurology recs and critical care management from [facility]     Type 2 DM  - blood glucose acceptable  - continue lantus 10 units nightly  - cover with ssi/hypoglycemia protocol     HTN  - BP acceptable  - continue current regimen     Alcoholic Cirrhosis  - compensated, has secondary thrombocytopenia  - holding lasix and aldactone for now     Hypothyroidism  - continue levothyroxine     Acute on chronic hyponatremia  - due to EtOH cirrhosis  - follows with Dr Rodriges  - 131 ()  - remains on fluid restriction, high protein diet     Flow (L/min):  [2] 2     DVT prophylaxis: Lovenox  Discussed with patient and nursing staff.  Anticipated discharge home, 1-2 days           Addendum  -Evaluated by physical therapy and they are recommending SNF.  At this time patient is interested and we will work with CCP to try to arrange discharge options.      Honorio Moralez MD  [facility]  24  17:36 EDT      Electronically signed by Honorio Moralez MD at 24 9001       Dennis Jaimes MD at 24 1217              PROGRESS NOTE      Patient Name: Anthony Lerma  : 1985  MRN: [id number]  Primary Care Physician: Provider, No Known  Date of admission: 2024    Patient Care  Team:  Provider, Laisha Known as PCP - General        Reason for Follow up     Established patient, prior LOVELY, chronic hyponatremia      Subjective     Seen and examined, no distress  Na 137, sCr 0.8    Review of systems:  Constitutional: weakness.  HEENT:  No headache, otalgia, itchy eyes, nasal discharge or sore throat.  Cardiac:  No chest pain, dyspnea, orthopnea or PND.  Chest:              No cough, phlegm or wheezing.  Abdomen:  No abdominal pain, nausea or vomiting.  Neuro:  No focal weakness, abnormal movements orseizure like activity.  :   No hematuria, no pyuria, no dysuria, no flank pain.  ROS was otherwise negative except as mentioned in the [place].       Personal History:     Past Medical History:   Past Medical History:   Diagnosis Date    Alcohol abuse with withdrawal     Diabetes mellitus     Seizure        Surgical History:    History reviewed. No pertinent surgical history.    Family History: family history is not on file. Otherwise pertinent FHx was reviewed and unremarkable.     Social History:  reports that he has been smoking cigarettes. He does not have any smokeless tobacco history on file. He reports current alcohol use. He reports that he does not use drugs.    Medications:  Prior to Admission medications    Medication Sig Start Date End Date Taking? Authorizing Provider   atorvastatin (LIPITOR) 20 MG tablet Take 1 tablet by mouth Daily.   Yes Lionel Cade MD   Continuous Glucose Sensor (Dexcom G7 Sensor) misc APPLY ONE SENSOR TOPICALLY EVERY 10 DAYS 7/17/24  Yes Lionel Cade MD   Cyanocobalamin 1000 MCG/ML kit Inject 1 mL as directed Every 30 (Thirty) Days.   Yes Lionel Cade MD   furosemide (LASIX) 20 MG tablet Take 1 tablet by mouth Daily. 6/21/24  Yes Lionel Cade MD   insulin aspart (NovoLOG FlexPen) 100 UNIT/ML solution pen-injector sc pen 5 UNITS BEFORE SUPPER (IF B/G GREATER THAN 200 USE 5 UNITS FOR EACH 50MG ABOVE 200, MAX 50 UNITS DAILY. 7/31/24   Yes Lionel Cade MD   Januvia 50 MG tablet Take 1 tablet by mouth Daily. 8/5/24  Yes Lionel Cade MD   Lantus SoloStar 100 UNIT/ML injection pen INJECT 8 TO 12 UNITS SUBCUTANEOUSLY ONCE DAILY   Yes Lionel Cade MD   levothyroxine (SYNTHROID, LEVOTHROID) 112 MCG tablet Take 1 tablet by mouth Daily. 6/19/24  Yes Lionel Cade MD   nortriptyline (PAMELOR) 10 MG capsule Take 1 capsule by mouth Every Night. 7/30/24  Yes Lionel Cade MD   pregabalin (LYRICA) 300 MG capsule Take 1 capsule by mouth 2 (Two) Times a Day.   Yes Lionel Cade MD   spironolactone (ALDACTONE) 50 MG tablet Take 1 tablet by mouth Daily. 7/28/24  Yes Lionel Cade MD   vitamin D (ERGOCALCIFEROL) 1.25 MG (55192 UT) capsule capsule Take 1 capsule by mouth Every 30 (Thirty) Days.   Yes Lionel Cade MD     Scheduled Meds:[Held by provider] amitriptyline, 10 mg, Oral, Nightly  enoxaparin, 40 mg, Subcutaneous, Daily  folic acid, 1 mg, Oral, Daily  [Held by provider] furosemide, 20 mg, Oral, Daily  insulin glargine, 10 Units, Subcutaneous, Nightly  insulin lispro, 2-9 Units, Subcutaneous, 4x Daily AC & at Bedtime  levothyroxine, 112 mcg, Oral, Daily  multivitamin with minerals, 1 tablet, Oral, Daily  nicotine, 1 patch, Transdermal, Q24H  pregabalin, 300 mg, Oral, BID  sodium chloride, 10 mL, Intravenous, Q12H  [Held by provider] spironolactone, 50 mg, Oral, Daily  thiamine, 100 mg, Oral, Daily      Continuous Infusions:   PRN Meds:  acetaminophen    senna-docusate sodium **AND** polyethylene glycol **AND** bisacodyl **AND** bisacodyl    Calcium Replacement - Follow Nurse / BPA Driven Protocol    cloNIDine    dextrose    dextrose    glucagon (human recombinant)    Magnesium Standard Dose Replacement - Follow Nurse / BPA Driven Protocol    Magnesium Standard Dose Replacement - Follow Nurse / BPA Driven Protocol    nicotine polacrilex    nitroglycerin    nitroglycerin    ondansetron ODT  **OR** ondansetron    Phosphorus Replacement - Follow Nurse / BPA Driven Protocol    Potassium Replacement - Follow Nurse / BPA Driven Protocol    Potassium Replacement - Follow Nurse / BPA Driven Protocol    [COMPLETED] Insert Peripheral IV **AND** sodium chloride    sodium chloride    sodium chloride  Allergies:  No Known Allergies    Objective   Exam:     Vital Signs  Temp:  [98.4 °F (36.9 °C)-99.1 °F (37.3 °C)] 98.8 °F (37.1 °C)  Heart Rate:  [] 105  Resp:  [17-20] 17  BP: (117-131)/(75-82) 117/82  SpO2:  [96 %-99 %] 97 %  on   ;   Device (Oxygen Therapy): room air  Body mass index is 30.65 kg/m².  EXAM  General:  Alert, no distress   Head:      Normocephalic and atraumatic.    Eyes:      PERRL/EOM intact, conjunctivae and sclerae clear without nystagmus.    Neck:      No masses, thyromegaly,  trachea central   Lungs:    Clear bilaterally to auscultation.    Heart:      Regular rate and rhythm, no murmur no gallop  Abd:        Soft, nontender, not distended, bowel sounds positive, no shifting dullness.  Msk:        No deformity or scoliosis noted of thoracic or lumbar spine.    Pulses:   Pulses normal in all 4 extremities.    Extremities:        No cyanosis or clubbing--no edema.    Neuro:    No focal deficits.   alert oriented x3  Skin:       Intact without lesions or rashes.    Psych:   Calm    Results Review:  I have personally reviewed most recent Data :  [facility]  CBC    Results from last 7 days   Lab Units 08/19/24  0351 08/18/24  0314 08/17/24  0500 08/16/24  0320 08/15/24  0351 08/14/24  0311 08/13/24  0307   WBC 10*3/mm3 4.48 5.93 7.04 6.97 5.74 5.17 6.11   HEMOGLOBIN g/dL 13.8 13.7 14.1 14.2 14.3 14.7 15.1   PLATELETS 10*3/mm3 137* 134* 129* 108* 75* 60* 57*     CMP   Results from last 7 days   Lab Units 08/19/24  0351 08/18/24  0314 08/17/24  0500 08/16/24  0320 08/15/24  0351 08/14/24  0311 08/13/24  1344 08/13/24  0307   SODIUM mmol/L 137 131* 137 131* 134* 137 132* 134*    POTASSIUM mmol/L 4.1 3.9 3.7 3.9 4.0 3.9 4.4 3.4*   CHLORIDE mmol/L 102 99 101 100 102 102 99 94*   CO2 mmol/L 25.4 22.0 18.8* 16.3* 16.8* 21.7* 23.4 22.0   BUN mg/dL 11 13 17 14 12 9 10 11   CREATININE mg/dL 0.83 0.83 0.88 0.89 0.82 0.66* 0.80 0.82   GLUCOSE mg/dL 112* 170* 112* 131* 114* 115* 160* 215*   ALBUMIN g/dL 3.1* 3.2* 3.4* 3.4* 3.3* 3.4*  --  3.7   BILIRUBIN mg/dL 0.7 0.6 0.8 0.9 1.1 1.1  --  1.2   ALK PHOS U/L 85 94 96 97 90 81  --  98   AST (SGOT) U/L 62* 36 49* 55* 56* 54*  --  84*   ALT (SGPT) U/L 39 29 34 37 40 44*  --  63*     ABG      CT Cervical Spine Without Contrast    Result Date: 8/13/2024  Electronically signed by Álvaro Rutledge MD on 08-13-24 at 0515    CT Head Without Contrast    Result Date: 8/13/2024  Electronically signed by Álvaro Rutledge MD on 08-13-24 at 0514           Assessment & Plan   Assessment and Plan:         ETOH abuse    Type 2 diabetes mellitus    Essential hypertension    Alcoholic cirrhosis of liver with ascites    Hypothyroidism (acquired)    ASSESSMENT:  Chronic hyponatremia  NAGMA  Alcohol withdrawal  Seizure  Alcoholic cirrhosis  DM2  TCP      PLAN :     Patient admitted with seizure r/t alcohol withdrawal  Follows with Dr Venkat Rodriges outpatient for previous LOVELY, chronic hyponatremia in the setting of alcoholic cirrhosis  Sodium 134 on admission, now 137  1.2L/day fluid restriction, encourage high protein diet  K, Mag acceptable, Phos on low side: will give Kphos IV x 2  Renal function intact, UA with ketones, proteinuria, hematuria, UPCR 1.8g  Monitor BMP daily  NAGMA, now resolved, likely with component of respiratory alkalosis in patient with cirrhosis, pH compensated  Receiving IV thiamine  BP trends labile, monitor  H&H stable but with TCP likely 2/2 cirrhosis, monitor  Strict I&O's  Avoid nephrotoxic medications including NSAIDs  Stable from renal standpoint    Note transcribed for Dr Theodore Childs, MILY  [facility]  2024  12:17 EDT    I have seen the pt and reviewed chart and confirmed the accuracy    as documented by the APRN and agree with the plans.     Dennis Jaimes MD      [facility]          Electronically signed by Dennis Jaimes MD at 24 1502       Owen Helm MD at 24 1110              PROGRESS NOTE      Patient Name: Anthony Lerma  : 1985  MRN: [id number]  Primary Care Physician: Provider, No Known  Date of admission: 2024    Patient Care Team:  Provider, No Known as PCP - General        Reason for Follow up     Established patient, prior LOVELY, chronic hyponatremia      Subjective     Seen and examined, no distress  Na 131, sCr 0.8    Review of systems:  Constitutional: weakness.  HEENT:  No headache, otalgia, itchy eyes, nasal discharge or sore throat.  Cardiac:  No chest pain, dyspnea, orthopnea or PND.  Chest:              No cough, phlegm or wheezing.  Abdomen:  No abdominal pain, nausea or vomiting.  Neuro:  No focal weakness, abnormal movements orseizure like activity.  :   No hematuria, no pyuria, no dysuria, no flank pain.  ROS was otherwise negative except as mentioned in the [place].       Personal History:     Past Medical History:   Past Medical History:   Diagnosis Date    Alcohol abuse with withdrawal     Diabetes mellitus     Seizure        Surgical History:    History reviewed. No pertinent surgical history.    Family History: family history is not on file. Otherwise pertinent FHx was reviewed and unremarkable.     Social History:  reports that he has been smoking cigarettes. He does not have any smokeless tobacco history on file. He reports current alcohol use. He reports that he does not use drugs.    Medications:  Prior to Admission medications    Medication Sig Start Date End Date Taking? Authorizing Provider   atorvastatin (LIPITOR) 20 MG tablet Take 1 tablet by mouth Daily.   Yes Provider, MD Lionel   Continuous Glucose Sensor  (Dexcom G7 Sensor) misc APPLY ONE SENSOR TOPICALLY EVERY 10 DAYS 7/17/24  Yes Lionel Cade MD   Cyanocobalamin 1000 MCG/ML kit Inject 1 mL as directed Every 30 (Thirty) Days.   Yes Lionel Cade MD   furosemide (LASIX) 20 MG tablet Take 1 tablet by mouth Daily. 6/21/24  Yes Lionel Cade MD   insulin aspart (NovoLOG FlexPen) 100 UNIT/ML solution pen-injector sc pen 5 UNITS BEFORE SUPPER (IF B/G GREATER THAN 200 USE 5 UNITS FOR EACH 50MG ABOVE 200, MAX 50 UNITS DAILY. 7/31/24  Yes Lionel Cade MD   Januvia 50 MG tablet Take 1 tablet by mouth Daily. 8/5/24  Yes Lionel Cade MD   Lanvladimir SoloStar 100 UNIT/ML injection pen INJECT 8 TO 12 UNITS SUBCUTANEOUSLY ONCE DAILY   Yes Lionel Cade MD   levothyroxine (SYNTHROID, LEVOTHROID) 112 MCG tablet Take 1 tablet by mouth Daily. 6/19/24  Yes Lionel Cade MD   nortriptyline (PAMELOR) 10 MG capsule Take 1 capsule by mouth Every Night. 7/30/24  Yes Lionel Cade MD   pregabalin (LYRICA) 300 MG capsule Take 1 capsule by mouth 2 (Two) Times a Day.   Yes Lionel Cade MD   spironolactone (ALDACTONE) 50 MG tablet Take 1 tablet by mouth Daily. 7/28/24  Yes Lionel Cade MD   vitamin D (ERGOCALCIFEROL) 1.25 MG (64775 UT) capsule capsule Take 1 capsule by mouth Every 30 (Thirty) Days.   Yes Lionel Cade MD     Scheduled Meds:[Held by provider] amitriptyline, 10 mg, Oral, Nightly  enoxaparin, 40 mg, Subcutaneous, Daily  folic acid, 1 mg, Oral, Daily  [Held by provider] furosemide, 20 mg, Oral, Daily  insulin glargine, 10 Units, Subcutaneous, Nightly  insulin lispro, 2-9 Units, Subcutaneous, 4x Daily AC & at Bedtime  levothyroxine, 112 mcg, Oral, Daily  multivitamin with minerals, 1 tablet, Oral, Daily  nicotine, 1 patch, Transdermal, Q24H  pregabalin, 300 mg, Oral, BID  sodium chloride, 10 mL, Intravenous, Q12H  [Held by provider] spironolactone, 50 mg, Oral, Daily  thiamine (B-1) IV, 200 mg,  Intravenous, Q8H   Followed by  [START ON 8/20/2024] thiamine, 100 mg, Oral, Daily      Continuous Infusions:   PRN Meds:  acetaminophen    senna-docusate sodium **AND** polyethylene glycol **AND** bisacodyl **AND** bisacodyl    Calcium Replacement - Follow Nurse / BPA Driven Protocol    cloNIDine    dextrose    dextrose    glucagon (human recombinant)    Magnesium Standard Dose Replacement - Follow Nurse / BPA Driven Protocol    Magnesium Standard Dose Replacement - Follow Nurse / BPA Driven Protocol    nicotine polacrilex    nitroglycerin    nitroglycerin    ondansetron ODT **OR** ondansetron    Phosphorus Replacement - Follow Nurse / BPA Driven Protocol    Potassium Replacement - Follow Nurse / BPA Driven Protocol    Potassium Replacement - Follow Nurse / BPA Driven Protocol    [COMPLETED] Insert Peripheral IV **AND** sodium chloride    sodium chloride    sodium chloride  Allergies:  No Known Allergies    Objective   Exam:     Vital Signs  Temp:  [97.6 °F (36.4 °C)-98.6 °F (37 °C)] 98.6 °F (37 °C)  Heart Rate:  [] 90  Resp:  [18-20] 18  BP: (106-129)/(61-81) 129/77  SpO2:  [96 %-98 %] 96 %  on  Flow (L/min):  [2] 2;   Device (Oxygen Therapy): nasal cannula  Body mass index is 30.94 kg/m².  EXAM  General:  Alert, no distress   Head:      Normocephalic and atraumatic.    Eyes:      PERRL/EOM intact, conjunctivae and sclerae clear without nystagmus.    Neck:      No masses, thyromegaly,  trachea central   Lungs:    Clear bilaterally to auscultation.    Heart:      Regular rate and rhythm, no murmur no gallop  Abd:        Soft, nontender, not distended, bowel sounds positive, no shifting dullness.  Msk:        No deformity or scoliosis noted of thoracic or lumbar spine.    Pulses:   Pulses normal in all 4 extremities.    Extremities:        No cyanosis or clubbing--no edema.    Neuro:    No focal deficits.   alert oriented x3  Skin:       Intact without lesions or rashes.    Psych:   Calm    Results Review:  I  have personally reviewed most recent Data :  [facility]  CBC    Results from last 7 days   Lab Units 08/18/24  0314 08/17/24  0500 08/16/24  0320 08/15/24  0351 08/14/24  0311 08/13/24  0307   WBC 10*3/mm3 5.93 7.04 6.97 5.74 5.17 6.11   HEMOGLOBIN g/dL 13.7 14.1 14.2 14.3 14.7 15.1   PLATELETS 10*3/mm3 134* 129* 108* 75* 60* 57*     CMP   Results from last 7 days   Lab Units 08/18/24  0314 08/17/24  0500 08/16/24  0320 08/15/24  0351 08/14/24  0311 08/13/24  1344 08/13/24  0307   SODIUM mmol/L 131* 137 131* 134* 137 132* 134*   POTASSIUM mmol/L 3.9 3.7 3.9 4.0 3.9 4.4 3.4*   CHLORIDE mmol/L 99 101 100 102 102 99 94*   CO2 mmol/L 22.0 18.8* 16.3* 16.8* 21.7* 23.4 22.0   BUN mg/dL 13 17 14 12 9 10 11   CREATININE mg/dL 0.83 0.88 0.89 0.82 0.66* 0.80 0.82   GLUCOSE mg/dL 170* 112* 131* 114* 115* 160* 215*   ALBUMIN g/dL 3.2* 3.4* 3.4* 3.3* 3.4*  --  3.7   BILIRUBIN mg/dL 0.6 0.8 0.9 1.1 1.1  --  1.2   ALK PHOS U/L 94 96 97 90 81  --  98   AST (SGOT) U/L 36 49* 55* 56* 54*  --  84*   ALT (SGPT) U/L 29 34 37 40 44*  --  63*     ABG      CT Cervical Spine Without Contrast    Result Date: 8/13/2024  Electronically signed by Álvaro Rutledge MD on 08-13-24 at 0515    CT Head Without Contrast    Result Date: 8/13/2024  Electronically signed by Álvaro Rutledge MD on 08-13-24 at 0514           Assessment & Plan   Assessment and Plan:         Alcohol withdrawal seizure    ETOH abuse    Type 2 diabetes mellitus    Essential hypertension    Alcoholic cirrhosis of liver with ascites    Hypothyroidism (acquired)    ASSESSMENT:  Chronic hyponatremia  NAGMA  Alcohol withdrawal  Seizure  Alcoholic cirrhosis  DM2  TCP      PLAN :     Patient admitted with seizure r/t alcohol withdrawal  Follows with Dr Venkat Rodriges outpatient for previous LOVELY, chronic hyponatremia in the setting of alcoholic cirrhosis  Sodium 134 on admission, now 131  Will tighten FR to 1.2L/day fluid restriction, encourage high protein diet  K,  Mag acceptable, Phos on low side: will give Kphos IV x 2  Renal function intact, UA with ketones, proteinuria, hematuria, UPCR 1.8g  Monitor BMP daily  NAGMA, now improved, likely with component of respiratory alkalosis in patient with cirrhosis, pH compensated  Receiving IV thiamine  BP trends labile, monitor  H&H stable but with TCP likely 2/2 cirrhosis, monitor  Strict I&O's  Avoid nephrotoxic medications including NSAIDs  We will follow and coordinate with team    Note transcribed for [facility]  2024  11:10 EDT    I have seen the pt and reviewed chart and confirmed the accuracy    as documented by the APRN and agree with the plans.     MD Israel [facility]          Electronically signed by Owen Helm MD at 24 1927       Luiz Corey MD at 24 1027              Name: Anthony Lerma ADMIT: 2024   : 1985  PCP: Provider, No Known    MRN: [id number] LOS: 5 days   AGE/SEX: 39 y.o. male  ROOM: [facility]     Subjective   Subjective   Sitting up in bed.  He reports he is feeling better.  Has not required any as needed Ativan in greater than 24 hours.    Objective   Objective   Vital Signs  Temp:  [97.6 °F (36.4 °C)-98.6 °F (37 °C)] 98.6 °F (37 °C)  Heart Rate:  [] 90  Resp:  [18-20] 18  BP: (106-129)/(61-81) 129/77  SpO2:  [96 %-98 %] 96 %  on  Flow (L/min):  [2] 2;   Device (Oxygen Therapy): nasal cannula  Body mass index is 30.94 kg/m².  Physical Exam  Constitutional:       Appearance: He is ill-appearing.   Pulmonary:      Effort: Pulmonary effort is normal. No respiratory distress.      Breath sounds: No stridor.   Skin:     Coloration: Skin is not pale.   Neurological:      Mental Status: He is alert and oriented to person, place, and time.         Results Review     I reviewed the patient's new clinical results.  Results from last 7 days   Lab Units 24  0314 24  0500 24  0320  08/15/24  0351   WBC 10*3/mm3 5.93 7.04 6.97 5.74   HEMOGLOBIN g/dL 13.7 14.1 14.2 14.3   PLATELETS 10*3/mm3 134* 129* 108* 75*     Results from last 7 days   Lab Units 08/18/24 0314 08/17/24  0500 08/16/24  0320 08/15/24  0351   SODIUM mmol/L 131* 137 131* 134*   POTASSIUM mmol/L 3.9 3.7 3.9 4.0   CHLORIDE mmol/L 99 101 100 102   CO2 mmol/L 22.0 18.8* 16.3* 16.8*   BUN mg/dL 13 17 14 12   CREATININE mg/dL 0.83 0.88 0.89 0.82   GLUCOSE mg/dL 170* 112* 131* 114*   EGFR mL/min/1.73 113.5 111.5 111.1 113.9     Results from last 7 days   Lab Units 08/18/24 0314 08/17/24  0500 08/16/24  0320 08/15/24  0351   ALBUMIN g/dL 3.2* 3.4* 3.4* 3.3*   BILIRUBIN mg/dL 0.6 0.8 0.9 1.1   ALK PHOS U/L 94 96 97 90   AST (SGOT) U/L 36 49* 55* 56*   ALT (SGPT) U/L 29 34 37 40     Results from last 7 days   Lab Units 08/18/24 0314 08/17/24  0500 08/16/24  0320 08/15/24  1512 08/15/24  0351   CALCIUM mg/dL 8.2* 8.2* 8.2*  --  8.6   ALBUMIN g/dL 3.2* 3.4* 3.4*  --  3.3*   MAGNESIUM mg/dL 2.0 2.2 2.1  --  2.1   PHOSPHORUS mg/dL 2.4* 2.4* 2.6 2.6 2.1*       Glucose   Date/Time Value Ref Range Status   08/18/2024 0733 200 (H) 70 - 130 mg/dL Final   08/17/2024 2030 143 (H) 70 - 130 mg/dL Final   08/17/2024 1651 197 (H) 70 - 130 mg/dL Final   08/17/2024 1201 136 (H) 70 - 130 mg/dL Final   08/17/2024 0544 131 (H) 70 - 130 mg/dL Final   08/16/2024 2158 123 70 - 130 mg/dL Final   08/16/2024 1806 130 70 - 130 mg/dL Final       No radiology results for the last day  Scheduled Medications  [Held by provider] amitriptyline, 10 mg, Oral, Nightly  enoxaparin, 40 mg, Subcutaneous, Daily  folic acid, 1 mg, Oral, Daily  [Held by provider] furosemide, 20 mg, Oral, Daily  insulin glargine, 10 Units, Subcutaneous, Nightly  insulin lispro, 2-9 Units, Subcutaneous, 4x Daily AC & at Bedtime  levothyroxine, 112 mcg, Oral, Daily  multivitamin with minerals, 1 tablet, Oral, Daily  nicotine, 1 patch, Transdermal, Q24H  pregabalin, 300 mg, Oral, BID  sodium  chloride, 10 mL, Intravenous, Q12H  [Held by provider] spironolactone, 50 mg, Oral, Daily  thiamine (B-1) IV, 200 mg, Intravenous, Q8H   Followed by  [START ON 8/20/2024] thiamine, 100 mg, Oral, Daily    Infusions   Diet  Diet: Diabetic, Fluid Restriction (240 mL/tray); Consistent Carbohydrate; 1500 mL/day; Fluid Consistency: Thin (IDDSI 0)      Assessment/Plan     Active Hospital Problems    Diagnosis  POA    **Alcohol withdrawal seizure [F10.939, R56.9]  Yes    Type 2 diabetes mellitus [E11.9]  Yes    Essential hypertension [I10]  Yes    Alcoholic cirrhosis of liver with ascites [K70.31]  Yes    Hypothyroidism (acquired) [E03.9]  Yes    ETOH abuse [F10.10]  Yes      Resolved Hospital Problems   No resolved problems to display.       39 y.o. male admitted with Alcohol withdrawal seizure.      08/18/24  Patient is medically stable at this time.  He is decisional.  Denies any HI or SI.  Spoke with his wife at bedside.  She is very concerned of him being discharged as she is worried about his alcohol use.  I explained that he is of sound mind to make his own medical decisions and I cannot force him to stay here against his will.  I agree that alcohol cessation and continued treatment would be the best in terms of his long-term overall health, however he is declining inpatient treatment. He had a 72hr hold earlier this hospitalization due to inability to make complex medical decisions at the time given severe alcohol withdrawal. He is out of withdrawal window and now medically stable to make his own decisions.     Alcohol Dependence in Withdrawal with Seizure  - finished phenobarbital, no ativan in >24hrs  - continue vitamin replacement   - follow electrolytes and replace as needed  - finished short term course of keppra as recommended by neurology  - appreciate neurology recs and critical care management from [facility]     Type 2 DM  - blood glucose acceptable  - continue lantus 10 units nightly  - cover with  ssi/hypoglycemia protocol     HTN  - BP acceptable  - continue current regimen     Alcoholic Cirrhosis  - compensated, has secondary thrombocytopenia  - holding lasix and aldactone for now     Hypothyroidism  - continue levothyroxine    Acute on chronic hyponatremia  - due to EtOH cirrhosis  - follows with Dr Rodriges  - 131 ()  - remains on fluid restriction, high protein diet    Flow (L/min):  [2] 2    DVT prophylaxis: Lovenox  Discussed with patient and nursing staff.  Anticipated discharge home, 1-2 days          Addendum  -Evaluated by physical therapy and they are recommending SNF.  At this time patient is interested and we will work with CCP to try to arrange discharge options.'    Luiz Corey MD  [facility]  24  10:32 EDT        Electronically signed by Luiz Corey MD at 24 1354       Owen Helm MD at 24 1108              PROGRESS NOTE      Patient Name: Anthony Lerma  : 1985  MRN: [id number]  Primary Care Physician: Provider, No Known  Date of admission: 2024    Patient Care Team:  Provider, No Known as PCP - General        Reason for Follow up     Established patient, prior LOVELY, chronic hyponatremia      Subjective     Seen and examined, out of restraints now, more cooperative, tremors, no distress  Na 137, sCr 0.88    Review of systems:  Constitutional: weakness.  HEENT:  No headache, otalgia, itchy eyes, nasal discharge or sore throat.  Cardiac:  No chest pain, dyspnea, orthopnea or PND.  Chest:              No cough, phlegm or wheezing.  Abdomen:  No abdominal pain, nausea or vomiting.  Neuro:  No focal weakness, abnormal movements orseizure like activity.  :   No hematuria, no pyuria, no dysuria, no flank pain.  ROS was otherwise negative except as mentioned in the [place].       Personal History:     Past Medical History:   Past Medical History:   Diagnosis Date    Alcohol abuse with withdrawal     Diabetes mellitus     Seizure         Surgical History:    History reviewed. No pertinent surgical history.    Family History: family history is not on file. Otherwise pertinent FHx was reviewed and unremarkable.     Social History:  reports that he has been smoking cigarettes. He does not have any smokeless tobacco history on file. He reports current alcohol use.    Medications:  Prior to Admission medications    Medication Sig Start Date End Date Taking? Authorizing Provider   atorvastatin (LIPITOR) 20 MG tablet Take 1 tablet by mouth Daily.   Yes Lionel Cade MD   Continuous Glucose Sensor (Dexcom G7 Sensor) misc APPLY ONE SENSOR TOPICALLY EVERY 10 DAYS 7/17/24  Yes Lionel Cade MD   Cyanocobalamin 1000 MCG/ML kit Inject 1 mL as directed Every 30 (Thirty) Days.   Yes Lionel Cade MD   furosemide (LASIX) 20 MG tablet Take 1 tablet by mouth Daily. 6/21/24  Yes Lionel Cade MD   insulin aspart (NovoLOG FlexPen) 100 UNIT/ML solution pen-injector sc pen 5 UNITS BEFORE SUPPER (IF B/G GREATER THAN 200 USE 5 UNITS FOR EACH 50MG ABOVE 200, MAX 50 UNITS DAILY. 7/31/24  Yes Lionel Cade MD   Januvia 50 MG tablet Take 1 tablet by mouth Daily. 8/5/24  Yes Lionel Cade MD   Lant SoloStar 100 UNIT/ML injection pen INJECT 8 TO 12 UNITS SUBCUTANEOUSLY ONCE DAILY   Yes Lionel Cade MD   levothyroxine (SYNTHROID, LEVOTHROID) 112 MCG tablet Take 1 tablet by mouth Daily. 6/19/24  Yes Lionel Cade MD   nortriptyline (PAMELOR) 10 MG capsule Take 1 capsule by mouth Every Night. 7/30/24  Yes Provider, Historical, MD   pregabalin (LYRICA) 300 MG capsule Take 1 capsule by mouth 2 (Two) Times a Day.   Yes Lionel Cade MD   spironolactone (ALDACTONE) 50 MG tablet Take 1 tablet by mouth Daily. 7/28/24  Yes Provider, Historical, MD   vitamin D (ERGOCALCIFEROL) 1.25 MG (07442 UT) capsule capsule Take 1 capsule by mouth Every 30 (Thirty) Days.   Yes Lionel Cade MD     Scheduled  Meds:[Held by provider] amitriptyline, 10 mg, Oral, Nightly  enoxaparin, 40 mg, Subcutaneous, Daily  folic acid, 1 mg, Oral, Daily  [Held by provider] furosemide, 20 mg, Oral, Daily  insulin glargine, 10 Units, Subcutaneous, Nightly  insulin regular, 2-9 Units, Subcutaneous, 4x Daily AC & at Bedtime  levothyroxine, 112 mcg, Oral, Daily  multivitamin with minerals, 1 tablet, Oral, Daily  nicotine, 1 patch, Transdermal, Q24H  pregabalin, 300 mg, Oral, BID  sodium chloride, 10 mL, Intravenous, Q12H  [Held by provider] spironolactone, 50 mg, Oral, Daily  thiamine (B-1) IV, 200 mg, Intravenous, Q8H   Followed by  [START ON 8/20/2024] thiamine, 100 mg, Oral, Daily      Continuous Infusions:   PRN Meds:  acetaminophen    senna-docusate sodium **AND** polyethylene glycol **AND** bisacodyl **AND** bisacodyl    Calcium Replacement - Follow Nurse / BPA Driven Protocol    cloNIDine    dextrose    dextrose    glucagon (human recombinant)    LORazepam **OR** LORazepam **OR** LORazepam **OR** LORazepam **OR** LORazepam **OR** LORazepam    Magnesium Standard Dose Replacement - Follow Nurse / BPA Driven Protocol    Magnesium Standard Dose Replacement - Follow Nurse / BPA Driven Protocol    nicotine polacrilex    nitroglycerin    nitroglycerin    ondansetron ODT **OR** ondansetron    Phosphorus Replacement - Follow Nurse / BPA Driven Protocol    Potassium Replacement - Follow Nurse / BPA Driven Protocol    Potassium Replacement - Follow Nurse / BPA Driven Protocol    [COMPLETED] Insert Peripheral IV **AND** sodium chloride    sodium chloride    sodium chloride  Allergies:  No Known Allergies    Objective   Exam:     Vital Signs  Temp:  [97.4 °F (36.3 °C)-98.8 °F (37.1 °C)] 97.8 °F (36.6 °C)  Heart Rate:  [] 115  Resp:  [18-20] 19  BP: (104-148)/(62-97) 111/62  SpO2:  [78 %-98 %] 98 %  on  Flow (L/min):  [2-4] 2;   Device (Oxygen Therapy): nasal cannula  Body mass index is 30.94 kg/m².  EXAM  General:  Alert, tremulous, no  distress   Head:      Normocephalic and atraumatic.    Eyes:      PERRL/EOM intact, conjunctivae and sclerae clear without nystagmus.    Neck:      No masses, thyromegaly,  trachea central   Lungs:    Clear bilaterally to auscultation.    Heart:      Regular rate and rhythm, no murmur no gallop  Abd:        Soft, nontender, not distended, bowel sounds positive, no shifting dullness.  Msk:        No deformity or scoliosis noted of thoracic or lumbar spine.    Pulses:   Pulses normal in all 4 extremities.    Extremities:        No cyanosis or clubbing--no edema.    Neuro:    No focal deficits.   alert oriented x3  Skin:       Intact without lesions or rashes.    Psych:   Calm    Results Review:  I have personally reviewed most recent Data :  [facility]  CBC    Results from last 7 days   Lab Units 08/17/24  0500 08/16/24  0320 08/15/24  0351 08/14/24  0311 08/13/24  0307   WBC 10*3/mm3 7.04 6.97 5.74 5.17 6.11   HEMOGLOBIN g/dL 14.1 14.2 14.3 14.7 15.1   PLATELETS 10*3/mm3 129* 108* 75* 60* 57*     CMP   Results from last 7 days   Lab Units 08/17/24  0500 08/16/24  0320 08/15/24  0351 08/14/24  0311 08/13/24  1344 08/13/24  0307   SODIUM mmol/L 137 131* 134* 137 132* 134*   POTASSIUM mmol/L 3.7 3.9 4.0 3.9 4.4 3.4*   CHLORIDE mmol/L 101 100 102 102 99 94*   CO2 mmol/L 18.8* 16.3* 16.8* 21.7* 23.4 22.0   BUN mg/dL 17 14 12 9 10 11   CREATININE mg/dL 0.88 0.89 0.82 0.66* 0.80 0.82   GLUCOSE mg/dL 112* 131* 114* 115* 160* 215*   ALBUMIN g/dL 3.4* 3.4* 3.3* 3.4*  --  3.7   BILIRUBIN mg/dL 0.8 0.9 1.1 1.1  --  1.2   ALK PHOS U/L 96 97 90 81  --  98   AST (SGOT) U/L 49* 55* 56* 54*  --  84*   ALT (SGPT) U/L 34 37 40 44*  --  63*     ABG      CT Cervical Spine Without Contrast    Result Date: 8/13/2024  Electronically signed by Álvaro Rutledge MD on 08-13-24 at 0515    CT Head Without Contrast    Result Date: 8/13/2024  Electronically signed by Álvaro Rutledge MD on 08-13-24 at 0514           Assessment &  Plan   Assessment and Plan:         Alcohol withdrawal seizure    ETOH abuse    Type 2 diabetes mellitus    Essential hypertension    Alcoholic cirrhosis of liver with ascites    Hypothyroidism (acquired)    ASSESSMENT:  Chronic hyponatremia  NAGMA  Alcohol withdrawal  Seizure  Alcoholic cirrhosis  DM2  TCP      PLAN :     Patient admitted with seizure r/t alcohol withdrawal  Follows with Dr Venkat Rodriges outpatient for previous LOVELY, chronic hyponatremia in the setting of alcoholic cirrhosis  Sodium 134 on admission, now 137.  1.8L/day fluid restriction, encourage high protein diet  K, Mag acceptable, Phos on low side: will give Kphos IV x 2  Renal function intact, will check UA with ketones, proteinuria, hematuria, UPCR 1.8g  Monitor BMP daily  NAGMA, likely with component of respiratory alkalosis in patient with cirrhosis, pH compensated  Receiving IV thiamine  BP trends labile, monitor  H&H stable but with TCP likely 2/2 cirrhosis, monitor  Strict I&O's  Avoid nephrotoxic medications including NSAIDs  We will follow and coordinate with team    Note transcribed for MILY Conroy  [facility]  2024  11:08 EDT    I have seen the pt and reviewed chart and confirmed the accuracy    as documented by the APRN and agree with the plans.     Owen Helm MD     [facility]          Electronically signed by Owen Helm MD at 24 1605       Luiz Corey MD at 24 0818              Name: Anthony Lerma ADMIT: 2024   : 1985  PCP: Provider, No Known    MRN: [id number] LOS: 4 days   AGE/SEX: 39 y.o. male  ROOM: E4/     Subjective   Subjective   Sitting up on side of bed.  He feels better today overall.    Objective   Objective   Vital Signs  Temp:  [97.4 °F (36.3 °C)-98.8 °F (37.1 °C)] 97.6 °F (36.4 °C)  Heart Rate:  [] 106  Resp:  [18-20] 18  BP: (104-148)/(62-97) 119/81  SpO2:  [78 %-98 %] 97 %  on  Flow (L/min):  [2-4] 2;   Device  (Oxygen Therapy): nasal cannula  Body mass index is 30.94 kg/m².  Physical Exam  Constitutional:       Appearance: He is ill-appearing.   Pulmonary:      Effort: Pulmonary effort is normal. No respiratory distress.      Breath sounds: No stridor.   Skin:     Coloration: Skin is not pale.   Neurological:      Mental Status: He is alert and oriented to person, place, and time.         Results Review     I reviewed the patient's new clinical results.  Results from last 7 days   Lab Units 08/17/24  0500 08/16/24  0320 08/15/24  0351 08/14/24  0311   WBC 10*3/mm3 7.04 6.97 5.74 5.17   HEMOGLOBIN g/dL 14.1 14.2 14.3 14.7   PLATELETS 10*3/mm3 129* 108* 75* 60*     Results from last 7 days   Lab Units 08/17/24  0500 08/16/24  0320 08/15/24  0351 08/14/24  0311   SODIUM mmol/L 137 131* 134* 137   POTASSIUM mmol/L 3.7 3.9 4.0 3.9   CHLORIDE mmol/L 101 100 102 102   CO2 mmol/L 18.8* 16.3* 16.8* 21.7*   BUN mg/dL 17 14 12 9   CREATININE mg/dL 0.88 0.89 0.82 0.66*   GLUCOSE mg/dL 112* 131* 114* 115*   EGFR mL/min/1.73 111.5 111.1 113.9 121.6     Results from last 7 days   Lab Units 08/17/24  0500 08/16/24  0320 08/15/24  0351 08/14/24  0311   ALBUMIN g/dL 3.4* 3.4* 3.3* 3.4*   BILIRUBIN mg/dL 0.8 0.9 1.1 1.1   ALK PHOS U/L 96 97 90 81   AST (SGOT) U/L 49* 55* 56* 54*   ALT (SGPT) U/L 34 37 40 44*     Results from last 7 days   Lab Units 08/17/24  0500 08/16/24  0320 08/15/24  1512 08/15/24  0351 08/14/24  0311   CALCIUM mg/dL 8.2* 8.2*  --  8.6 8.4*   ALBUMIN g/dL 3.4* 3.4*  --  3.3* 3.4*   MAGNESIUM mg/dL 2.2 2.1  --  2.1 2.0   PHOSPHORUS mg/dL 2.4* 2.6 2.6 2.1* 2.3*       Glucose   Date/Time Value Ref Range Status   08/17/2024 1201 136 (H) 70 - 130 mg/dL Final   08/17/2024 0544 131 (H) 70 - 130 mg/dL Final   08/16/2024 2158 123 70 - 130 mg/dL Final   08/16/2024 1806 130 70 - 130 mg/dL Final   08/16/2024 1351 128 70 - 130 mg/dL Final   08/16/2024 0556 102 70 - 130 mg/dL Final   08/15/2024 2318 126 70 - 130 mg/dL Final       No  radiology results for the last day  Scheduled Medications  [Held by provider] amitriptyline, 10 mg, Oral, Nightly  enoxaparin, 40 mg, Subcutaneous, Daily  folic acid, 1 mg, Oral, Daily  [Held by provider] furosemide, 20 mg, Oral, Daily  insulin glargine, 10 Units, Subcutaneous, Nightly  insulin regular, 2-9 Units, Subcutaneous, 4x Daily AC & at Bedtime  levothyroxine, 112 mcg, Oral, Daily  multivitamin with minerals, 1 tablet, Oral, Daily  nicotine, 1 patch, Transdermal, Q24H  potassium phosphate, 15 mmol, Intravenous, Q3H  pregabalin, 300 mg, Oral, BID  sodium chloride, 10 mL, Intravenous, Q12H  [Held by provider] spironolactone, 50 mg, Oral, Daily  thiamine (B-1) IV, 200 mg, Intravenous, Q8H   Followed by  [START ON 8/20/2024] thiamine, 100 mg, Oral, Daily    Infusions   Diet  Diet: Diabetic, Fluid Restriction (240 mL/tray); Consistent Carbohydrate; 1500 mL/day; Fluid Consistency: Thin (IDDSI 0)      Assessment/Plan     Active Hospital Problems    Diagnosis  POA    **Alcohol withdrawal seizure [F10.939, R56.9]  Yes    Type 2 diabetes mellitus [E11.9]  Yes    Essential hypertension [I10]  Yes    Alcoholic cirrhosis of liver with ascites [K70.31]  Yes    Hypothyroidism (acquired) [E03.9]  Yes    ETOH abuse [F10.10]  Yes      Resolved Hospital Problems   No resolved problems to display.       39 y.o. male admitted with Alcohol withdrawal seizure.      08/17/24  Thrombocytopenia is slowly improving.  Received IV Ativan this morning.  Continue with CIWA protocol.    Alcohol Dependence in Withdrawal with Seizure  - continue CIWA protocol with ativan, finished phenobarbital  - continue vitamin replacement   - follow electrolytes and replace as needed  - finished short term course of keppra as recommended by neurology  - appreciate neurology recs and critical care management from [facility]     Type 2 DM  - blood glucose acceptable  - continue lantus 10 units nightly  - cover with ssi/hypoglycemia protocol     HTN  - BP  acceptable  - continue current regimen     Alcoholic Cirrhosis  - appears reasonably well compensated, has secondary thrombocytopenia  - holding lasix and aldactone for now     Hypothyroidism  - continue levothyroxine     Metabolic Acidosis  - saline stopped as above  - will monitor as oral intake increases    Flow (L/min):  [2-4] 2    DVT prophylaxis: Lovenox  Discussed with patient.  Anticipated discharge home, 1-2 days            Luiz Corey MD  [facility]  24  14:48 EDT        Electronically signed by Luiz Corey MD at 24 1448       Jesse Sellers MD at 24 0806              [facility] INPATIENT PROGRESS NOTE         [address]    2024      PATIENT IDENTIFICATION:  Name: Anthony Lerma ADMIT: 2024   : 1985  PCP: Provider, No Known    MRN: [id number] LOS: 4 days   AGE/SEX: 39 y.o. male  ROOM: [facility]                     LOS 4    Reason for visit: Critical care management with alcohol withdrawal      SUBJECTIVE:      Doing well out of intensive care.  Out of restraints.  Calm and cooperative.  Still mildly tremulous.  Hospitalist service has taken over medical management outside of ICU and we will sign off.      Objective   OBJECTIVE:    Vital Sign Min/Max for last 24 hours  Temp  Min: 97.4 °F (36.3 °C)  Max: 98.8 °F (37.1 °C)   BP  Min: 104/72  Max: 148/97   Pulse  Min: 97  Max: 125   Resp  Min: 18  Max: 20   SpO2  Min: 78 %  Max: 98 %   No data recorded   No data recorded    Vitals:    24 0245 24 0306 24 0700 24 0831   BP:  104/72 111/62    BP Location:  Right arm Right arm    Patient Position:  Lying Lying    Pulse: 101 97 115    Resp:  20 19    Temp:  97.4 °F (36.3 °C) 97.8 °F (36.6 °C)    TempSrc:  Axillary Oral    SpO2: (!) 78% 96% 98% 98%   Weight:       Height:                24  0507 08/15/24  0300 08/15/24  0503   Weight: 96.8 kg (213 lb 6.5 oz) 97.8 kg (215 lb 9.8 oz) 97.8 kg (215  lb 9.8 oz)       Body mass index is 30.94 kg/m².                          Body mass index is 30.94 kg/m².    Intake/Output Summary (Last 24 hours) at 8/17/2024 0932  Last data filed at 8/17/2024 0758  Gross per 24 hour   Intake 1680 ml   Output --   Net 1680 ml         Exam:  GEN:  No distress, appears stated age  EYES:   PERRL, anicteric sclerae  ENT:    External ears/nose normal, OP clear  NECK:  No adenopathy, midline trachea  LUNGS: Normal chest on inspection, palpation and auscultation  CV:  Normal S1S2, without murmur  ABD:  Nontender, nondistended, no hepatosplenomegaly, +BS  EXT:  No edema.  No cyanosis or clubbing.  No mottling and normal cap refill.    Assessment     Scheduled meds:  [Held by provider] amitriptyline, 10 mg, Oral, Nightly  enoxaparin, 40 mg, Subcutaneous, Daily  folic acid, 1 mg, Oral, Daily  [Held by provider] furosemide, 20 mg, Oral, Daily  insulin glargine, 10 Units, Subcutaneous, Nightly  insulin regular, 2-9 Units, Subcutaneous, 4x Daily AC & at Bedtime  levothyroxine, 112 mcg, Oral, Daily  multivitamin with minerals, 1 tablet, Oral, Daily  nicotine, 1 patch, Transdermal, Q24H  pregabalin, 300 mg, Oral, BID  sodium chloride, 10 mL, Intravenous, Q12H  [Held by provider] spironolactone, 50 mg, Oral, Daily  thiamine (B-1) IV, 200 mg, Intravenous, Q8H   Followed by  [START ON 8/20/2024] thiamine, 100 mg, Oral, Daily      IV meds:                         Data Review:  Results from last 7 days   Lab Units 08/17/24  0500 08/16/24  0320 08/15/24  0351 08/14/24  0311 08/13/24  1344   SODIUM mmol/L 137 131* 134* 137 132*   POTASSIUM mmol/L 3.7 3.9 4.0 3.9 4.4   CHLORIDE mmol/L 101 100 102 102 99   CO2 mmol/L 18.8* 16.3* 16.8* 21.7* 23.4   BUN mg/dL 17 14 12 9 10   CREATININE mg/dL 0.88 0.89 0.82 0.66* 0.80   GLUCOSE mg/dL 112* 131* 114* 115* 160*   CALCIUM mg/dL 8.2* 8.2* 8.6 8.4* 8.7         Estimated Creatinine Clearance: 130.8 mL/min (by C-G formula based on SCr of 0.88 mg/dL).  Results from  last 7 days   Lab Units 24  0500 24  0320 08/15/24  0351 24  0311 24  0307   WBC 10*3/mm3 7.04 6.97 5.74 5.17 6.11   HEMOGLOBIN g/dL 14.1 14.2 14.3 14.7 15.1   PLATELETS 10*3/mm3 129* 108* 75* 60* 57*         Results from last 7 days   Lab Units 24  0500 24  0320 08/15/24  0351 24  0311 24  0307   ALT (SGPT) U/L 34 37 40 44* 63*   AST (SGOT) U/L 49* 55* 56* 54* 84*                 Glucose   Date/Time Value Ref Range Status   2024 0544 131 (H) 70 - 130 mg/dL Final   2024 2158 123 70 - 130 mg/dL Final   2024 1806 130 70 - 130 mg/dL Final   2024 1351 128 70 - 130 mg/dL Final   2024 0556 102 70 - 130 mg/dL Final   08/15/2024 2318 126 70 - 130 mg/dL Final   08/15/2024 1947 114 70 - 130 mg/dL Final         Imaging reviewed  CT head reviewed: No acute             Active Hospital Problems    Diagnosis  POA    **Alcohol withdrawal seizure [F10.939, R56.9]  Yes    Type 2 diabetes mellitus [E11.9]  Yes    Essential hypertension [I10]  Yes    Alcoholic cirrhosis of liver with ascites [K70.31]  Yes    Hypothyroidism (acquired) [E03.9]  Yes    ETOH abuse [F10.10]  Yes      Resolved Hospital Problems   No resolved problems to display.         ASSESSMENT:  Alcohol withdrawal  Treatments  Seizure associate with alcohol withdrawal  Alcohol abuse  Cirrhosis  Diabetes with hyperglycemia  Hyponatremia clinically significant  Thrombocytopenia      PLAN:  Still a bit tremulous but calm and cooperative.  No longer requiring restraints.  Hospitalist service has taken over medical management outside of ICU and we will sign off.      Jesse Sellers MD  Pulmonary and Critical Care Medicine  [facility], [facility]  2024    09:32 EDT       Electronically signed by Jesse Sellers MD at 24 0977       Álvaro Resendez MD at 24 7143              Name: Anthony Lerma ADMIT: 2024   : 1985  PCP: Provider, No Known    MRN:  [id number] LOS: 3 days   AGE/SEX: 39 y.o. male  ROOM: [facility]     Subjective   Subjective   No acute events. No family at bedside.    Objective   Objective   Vital Signs  Temp:  [98.2 °F (36.8 °C)-98.9 °F (37.2 °C)] 98.2 °F (36.8 °C)  Heart Rate:  [] 125  Resp:  [18] 18  BP: (104-150)/(46-95) 147/78  SpO2:  [92 %-98 %] 97 %  on  Flow (L/min):  [2-4] 4;   Device (Oxygen Therapy): nasal cannula  Body mass index is 30.94 kg/m².  Physical Exam  Vitals and nursing note reviewed.   Constitutional:       General: He is not in acute distress.     Appearance: He is ill-appearing. He is not toxic-appearing or diaphoretic.   HENT:      Head: Normocephalic and atraumatic.      Nose: Nose normal.      Mouth/Throat:      Mouth: Mucous membranes are moist.      Pharynx: Oropharynx is clear.   Eyes:      Conjunctiva/sclera: Conjunctivae normal.      Pupils: Pupils are equal, round, and reactive to light.   Cardiovascular:      Rate and Rhythm: Normal rate and regular rhythm.      Pulses: Normal pulses.   Pulmonary:      Effort: Pulmonary effort is normal.      Breath sounds: Normal breath sounds.   Abdominal:      General: Bowel sounds are normal. There is no distension.      Palpations: Abdomen is soft.      Tenderness: There is no abdominal tenderness.   Musculoskeletal:         General: No swelling or tenderness.      Cervical back: Neck supple.   Skin:     General: Skin is warm and dry.      Capillary Refill: Capillary refill takes less than 2 seconds.   Neurological:      Mental Status: He is lethargic.       Results Review     I reviewed the patient's new clinical results.  Results from last 7 days   Lab Units 08/16/24  0320 08/15/24  0351 08/14/24  0311 08/13/24  0307   WBC 10*3/mm3 6.97 5.74 5.17 6.11   HEMOGLOBIN g/dL 14.2 14.3 14.7 15.1   PLATELETS 10*3/mm3 108* 75* 60* 57*     Results from last 7 days   Lab Units 08/16/24  0320 08/15/24  0351 08/14/24  0311 08/13/24  1344   SODIUM mmol/L 131* 134* 137 132*  no   POTASSIUM mmol/L 3.9 4.0 3.9 4.4   CHLORIDE mmol/L 100 102 102 99   CO2 mmol/L 16.3* 16.8* 21.7* 23.4   BUN mg/dL 14 12 9 10   CREATININE mg/dL 0.89 0.82 0.66* 0.80   GLUCOSE mg/dL 131* 114* 115* 160*   EGFR mL/min/1.73 111.1 113.9 121.6 114.7     Results from last 7 days   Lab Units 08/16/24  0320 08/15/24  0351 08/14/24  0311 08/13/24  0307   ALBUMIN g/dL 3.4* 3.3* 3.4* 3.7   BILIRUBIN mg/dL 0.9 1.1 1.1 1.2   ALK PHOS U/L 97 90 81 98   AST (SGOT) U/L 55* 56* 54* 84*   ALT (SGPT) U/L 37 40 44* 63*     Results from last 7 days   Lab Units 08/16/24  0320 08/15/24  1512 08/15/24  0351 08/14/24  0311 08/13/24  1344 08/13/24  0307   CALCIUM mg/dL 8.2*  --  8.6 8.4* 8.7 9.3   ALBUMIN g/dL 3.4*  --  3.3* 3.4*  --  3.7   MAGNESIUM mg/dL 2.1  --  2.1 2.0  --  1.2*   PHOSPHORUS mg/dL 2.6 2.6 2.1* 2.3*  --   --        Glucose   Date/Time Value Ref Range Status   08/16/2024 1351 128 70 - 130 mg/dL Final   08/16/2024 0556 102 70 - 130 mg/dL Final   08/15/2024 2318 126 70 - 130 mg/dL Final   08/15/2024 1947 114 70 - 130 mg/dL Final   08/15/2024 1830 123 70 - 130 mg/dL Final   08/15/2024 1214 135 (H) 70 - 130 mg/dL Final   08/15/2024 0609 105 70 - 130 mg/dL Final       No radiology results for the last day    I have personally reviewed all medications:  Scheduled Medications  [Held by provider] amitriptyline, 10 mg, Oral, Nightly  enoxaparin, 40 mg, Subcutaneous, Daily  folic acid, 1 mg, Oral, Daily  [Held by provider] furosemide, 20 mg, Oral, Daily  insulin glargine, 10 Units, Subcutaneous, Nightly  insulin regular, 2-9 Units, Subcutaneous, Q6H  levothyroxine, 112 mcg, Oral, Daily  multivitamin with minerals, 1 tablet, Oral, Daily  nicotine, 1 patch, Transdermal, Q24H  pregabalin, 300 mg, Oral, BID  sodium chloride, 10 mL, Intravenous, Q12H  [Held by provider] spironolactone, 50 mg, Oral, Daily  thiamine (B-1) IV, 200 mg, Intravenous, Q8H   Followed by  [START ON 8/20/2024] thiamine, 100 mg, Oral, Daily    Infusions    Diet  Diet: Liquid, Fluid Restriction (240 mL/tray); Full Liquid; 1500 mL/day; Fluid Consistency: Thin (IDDSI 0)    I have personally reviewed:  [x]  Laboratory   []  Microbiology   []  Radiology   [x]  EKG/Telemetry  []  Cardiology/Vascular   []  Pathology    []  Records    Assessment/Plan     Active Hospital Problems    Diagnosis  POA    **Alcohol withdrawal seizure [F10.939, R56.9]  Yes    Type 2 diabetes mellitus [E11.9]  Yes    Essential hypertension [I10]  Yes    Alcoholic cirrhosis of liver with ascites [K70.31]  Yes    Hypothyroidism (acquired) [E03.9]  Yes    ETOH abuse [F10.10]  Yes      Resolved Hospital Problems   No resolved problems to display.   Alcohol Dependence in Withdrawal with Seizure  - continue CIWA protocol with ativan, finished phenobarbital  - continue vitamin replacement   - follow electrolytes and replace as needed  - finished short term course of keppra as recommended by neurology  - appreciate neurology recs and critical care management from [facility]    Type 2 DM  - blood glucose acceptable  - continue lantus 10 units nightly  - cover with ssi/hypoglycemia protocol    HTN  - BP acceptable  - continue current regimen    Alcoholic Cirrhosis  - appears reasonably well compensated, has secondary thrombocytopenia  - holding lasix and aldactone for now    Hypothyroidism  - continue levothyroxine    Metabolic Acidosis  - saline stopped as above  - will monitor as oral intake increases    Lovenox 40 mg SC daily for DVT prophylaxis.  Full code.  Discussed with patient and nursing staff.  Anticipate discharge home timing yet to be determined.  Expected Discharge Date: 8/18/2024; Expected Discharge Time: 12:00 PM      Álvaro Resendez MD  [facility]  08/16/24  16:04 EDT    Portions of this text have been copied and I have reviewed them. They are accurate as of 8/16/2024      Electronically signed by Álvaro Resendez MD at 08/16/24 0588       Mega Sigala MD at 08/16/24  1153          Patient is followed by [facility]  We will transfer consult to them for continued of care.      Thank you for your consultation    Electronically signed by Mega Sigala MD at 24 1154       Jesse Sellers MD at 24 0738              [facility] INPATIENT PROGRESS NOTE         [facility] INTENSIVE CARE    2024      PATIENT IDENTIFICATION:  Name: Anthony Lerma ADMIT: 2024   : 1985  PCP: Provider, No Known    MRN: [id number] LOS: 3 days   AGE/SEX: 39 y.o. male  ROOM: [facility]                     LOS 3    Reason for visit: Critical care management with alcohol withdrawal      SUBJECTIVE:      Still with some agitation and confusion but stabilized.  Requires restraints.  Not requiring any drips could downgrade to telemetry floor with sitter.      Objective   OBJECTIVE:    Vital Sign Min/Max for last 24 hours  Temp  Min: 98.4 °F (36.9 °C)  Max: 98.9 °F (37.2 °C)   BP  Min: 104/63  Max: 163/72   Pulse  Min: 63  Max: 128   Resp  Min: 18  Max: 18   SpO2  Min: 92 %  Max: 98 %   No data recorded   No data recorded    Vitals:    24 0400 24 0500 24 0700 24 0730   BP: 107/73 114/62 118/46 128/95   Pulse: 92 98 120 (!) 123   Resp:       Temp:   98.9 °F (37.2 °C)    TempSrc:   Oral    SpO2: 95% 94% 96% 94%   Weight:       Height:                24  0507 08/15/24  0300 08/15/24  0503   Weight: 96.8 kg (213 lb 6.5 oz) 97.8 kg (215 lb 9.8 oz) 97.8 kg (215 lb 9.8 oz)       Body mass index is 30.94 kg/m².                          Body mass index is 30.94 kg/m².    Intake/Output Summary (Last 24 hours) at 2024 0854  Last data filed at 8/15/2024 2334  Gross per 24 hour   Intake 810.75 ml   Output 0 ml   Net 810.75 ml         Exam:  GEN:  No distress, appears stated age  EYES:   PERRL, anicteric sclerae  ENT:    External ears/nose normal, OP clear  NECK:  No adenopathy, midline trachea  LUNGS: Normal chest on inspection, palpation and  auscultation  CV:  Normal S1S2, without murmur  ABD:  Nontender, nondistended, no hepatosplenomegaly, +BS  EXT:  No edema.  No cyanosis or clubbing.  No mottling and normal cap refill.    Assessment     Scheduled meds:  [Held by provider] amitriptyline, 10 mg, Oral, Nightly  enoxaparin, 40 mg, Subcutaneous, Daily  folic acid, 1 mg, Oral, Daily  [Held by provider] furosemide, 20 mg, Oral, Daily  insulin glargine, 10 Units, Subcutaneous, Nightly  insulin regular, 2-9 Units, Subcutaneous, Q6H  levETIRAcetam, 250 mg, Intravenous, Q12H  levothyroxine, 112 mcg, Oral, Daily  multivitamin with minerals, 1 tablet, Oral, Daily  nicotine, 1 patch, Transdermal, Q24H  pregabalin, 300 mg, Oral, BID  sodium chloride, 10 mL, Intravenous, Q12H  [Held by provider] spironolactone, 50 mg, Oral, Daily  thiamine (B-1) IV, 200 mg, Intravenous, Q8H   Followed by  [START ON 8/20/2024] thiamine, 100 mg, Oral, Daily      IV meds:                         Data Review:  Results from last 7 days   Lab Units 08/16/24  0320 08/15/24  0351 08/14/24  0311 08/13/24  1344 08/13/24  0307   SODIUM mmol/L 131* 134* 137 132* 134*   POTASSIUM mmol/L 3.9 4.0 3.9 4.4 3.4*   CHLORIDE mmol/L 100 102 102 99 94*   CO2 mmol/L 16.3* 16.8* 21.7* 23.4 22.0   BUN mg/dL 14 12 9 10 11   CREATININE mg/dL 0.89 0.82 0.66* 0.80 0.82   GLUCOSE mg/dL 131* 114* 115* 160* 215*   CALCIUM mg/dL 8.2* 8.6 8.4* 8.7 9.3         Estimated Creatinine Clearance: 129.4 mL/min (by C-G formula based on SCr of 0.89 mg/dL).  Results from last 7 days   Lab Units 08/16/24  0320 08/15/24  0351 08/14/24  0311 08/13/24  0307   WBC 10*3/mm3 6.97 5.74 5.17 6.11   HEMOGLOBIN g/dL 14.2 14.3 14.7 15.1   PLATELETS 10*3/mm3 108* 75* 60* 57*         Results from last 7 days   Lab Units 08/16/24  0320 08/15/24  0351 08/14/24  0311 08/13/24  0307   ALT (SGPT) U/L 37 40 44* 63*   AST (SGOT) U/L 55* 56* 54* 84*                 Glucose   Date/Time Value Ref Range Status   08/16/2024 0556 102 70 - 130 mg/dL  Final   08/15/2024 2318 126 70 - 130 mg/dL Final   08/15/2024 1947 114 70 - 130 mg/dL Final   08/15/2024 1830 123 70 - 130 mg/dL Final   08/15/2024 1214 135 (H) 70 - 130 mg/dL Final   08/15/2024 0609 105 70 - 130 mg/dL Final   2024 2358 92 70 - 130 mg/dL Final         Imaging reviewed  CT head reviewed: No acute             Active Hospital Problems    Diagnosis  POA    **Alcohol withdrawal seizure [F10.939, R56.9]  Yes    Type 2 diabetes mellitus [E11.9]  Yes    Essential hypertension [I10]  Yes    Alcoholic cirrhosis of liver with ascites [K70.31]  Yes    Hypothyroidism (acquired) [E03.9]  Yes    ETOH abuse [F10.10]  Yes      Resolved Hospital Problems   No resolved problems to display.         ASSESSMENT:  Alcohol withdrawal  Treatments  Seizure associate with alcohol withdrawal  Alcohol abuse  Cirrhosis  Diabetes with hyperglycemia  Hyponatremia clinically significant  Thrombocytopenia      PLAN:  Keppra for seizure prophylaxis.  CIWA protocol for alcohol withdrawal.  Requiring restraints and sitter.  Agitation and confusion improving.  Can downgrade to telemetry floor.  Control glucose.  Correct electrolytes as needed.  DVT prophylaxis.    Discussed with multidisciplinary ICU team on rounds this morning.        Jesse Sellers MD  Pulmonary and Critical Care Medicine  [facility]  2024    08:54 EDT       Electronically signed by Jesse Sellers MD at 24 0856       Álvaro Resendez MD at 08/15/24 0638              Name: Anthony Lerma ADMIT: 2024   : 1985  PCP: Provider, No Known    MRN: [id number] LOS: 2 days   AGE/SEX: 39 y.o. male  ROOM: [facility]     Subjective   Subjective   No acute events. No family at bedside.    Objective   Objective   Vital Signs  Temp:  [97.5 °F (36.4 °C)-98.9 °F (37.2 °C)] 98.5 °F (36.9 °C)  Heart Rate:  [] 107  Resp:  [18] 18  BP: (107-163)/() 145/92  SpO2:  [92 %-98 %] 96 %  on  Flow (L/min):  [2] 2;   Device (Oxygen  Therapy): nasal cannula  Body mass index is 30.94 kg/m².  Physical Exam  Vitals and nursing note reviewed.   Constitutional:       General: He is not in acute distress.     Appearance: He is ill-appearing. He is not toxic-appearing or diaphoretic.   HENT:      Head: Normocephalic and atraumatic.      Nose: Nose normal.      Mouth/Throat:      Mouth: Mucous membranes are moist.      Pharynx: Oropharynx is clear.   Eyes:      Conjunctiva/sclera: Conjunctivae normal.      Pupils: Pupils are equal, round, and reactive to light.   Cardiovascular:      Rate and Rhythm: Normal rate and regular rhythm.      Pulses: Normal pulses.   Pulmonary:      Effort: Pulmonary effort is normal.      Breath sounds: Normal breath sounds.   Abdominal:      General: Bowel sounds are normal. There is no distension.      Palpations: Abdomen is soft.      Tenderness: There is no abdominal tenderness.   Musculoskeletal:         General: No swelling or tenderness.      Cervical back: Neck supple.   Skin:     General: Skin is warm and dry.      Capillary Refill: Capillary refill takes less than 2 seconds.   Neurological:      Mental Status: He is lethargic.       Results Review     I reviewed the patient's new clinical results.  Results from last 7 days   Lab Units 08/15/24  0351 08/14/24  0311 08/13/24  0307   WBC 10*3/mm3 5.74 5.17 6.11   HEMOGLOBIN g/dL 14.3 14.7 15.1   PLATELETS 10*3/mm3 75* 60* 57*     Results from last 7 days   Lab Units 08/15/24  0351 08/14/24  0311 08/13/24  1344 08/13/24  0307   SODIUM mmol/L 134* 137 132* 134*   POTASSIUM mmol/L 4.0 3.9 4.4 3.4*   CHLORIDE mmol/L 102 102 99 94*   CO2 mmol/L 16.8* 21.7* 23.4 22.0   BUN mg/dL 12 9 10 11   CREATININE mg/dL 0.82 0.66* 0.80 0.82   GLUCOSE mg/dL 114* 115* 160* 215*   EGFR mL/min/1.73 113.9 121.6 114.7 113.9     Results from last 7 days   Lab Units 08/15/24  0351 08/14/24  0311 08/13/24  0307   ALBUMIN g/dL 3.3* 3.4* 3.7   BILIRUBIN mg/dL 1.1 1.1 1.2   ALK PHOS U/L 90 81 98    AST (SGOT) U/L 56* 54* 84*   ALT (SGPT) U/L 40 44* 63*     Results from last 7 days   Lab Units 08/15/24  1512 08/15/24  0351 08/14/24  0311 08/13/24  1344 08/13/24  0307   CALCIUM mg/dL  --  8.6 8.4* 8.7 9.3   ALBUMIN g/dL  --  3.3* 3.4*  --  3.7   MAGNESIUM mg/dL  --  2.1 2.0  --  1.2*   PHOSPHORUS mg/dL 2.6 2.1* 2.3*  --   --        Glucose   Date/Time Value Ref Range Status   08/15/2024 1214 135 (H) 70 - 130 mg/dL Final   08/15/2024 0609 105 70 - 130 mg/dL Final   08/14/2024 2358 92 70 - 130 mg/dL Final   08/14/2024 1914 89 70 - 130 mg/dL Final   08/14/2024 1648 94 70 - 130 mg/dL Final   08/14/2024 1246 110 70 - 130 mg/dL Final   08/14/2024 0732 119 70 - 130 mg/dL Final       No radiology results for the last day    I have personally reviewed all medications:  Scheduled Medications  [Held by provider] amitriptyline, 10 mg, Oral, Nightly  enoxaparin, 40 mg, Subcutaneous, Daily  folic acid, 1 mg, Oral, Daily  [Held by provider] furosemide, 20 mg, Oral, Daily  insulin glargine, 10 Units, Subcutaneous, Nightly  insulin regular, 2-9 Units, Subcutaneous, Q6H  levETIRAcetam, 250 mg, Oral, BID  levothyroxine, 112 mcg, Oral, Daily  multivitamin with minerals, 1 tablet, Oral, Daily  nicotine, 1 patch, Transdermal, Q24H  PHENobarbital, 32.4 mg, Intravenous, Once  pregabalin, 300 mg, Oral, BID  sodium chloride, 10 mL, Intravenous, Q12H  [Held by provider] spironolactone, 50 mg, Oral, Daily  thiamine (B-1) IV, 500 mg, Intravenous, Q8H   Followed by  [START ON 8/16/2024] thiamine (B-1) IV, 200 mg, Intravenous, Q8H   Followed by  [START ON 8/20/2024] thiamine, 100 mg, Oral, Daily    Infusions   Diet  Diet: Liquid; Full Liquid; Fluid Consistency: Thin (IDDSI 0)    I have personally reviewed:  [x]  Laboratory   []  Microbiology   []  Radiology   [x]  EKG/Telemetry  []  Cardiology/Vascular   []  Pathology    []  Records    Assessment/Plan     Active Hospital Problems    Diagnosis  POA    **Alcohol withdrawal seizure [F10.149,  R56.9]  Yes    Type 2 diabetes mellitus [E11.9]  Yes    Essential hypertension [I10]  Yes    Alcoholic cirrhosis of liver with ascites [K70.31]  Yes    Hypothyroidism (acquired) [E03.9]  Yes    ETOH abuse [F10.10]  Yes      Resolved Hospital Problems   No resolved problems to display.   Alcohol Dependence in Withdrawal with Seizure  - continue CIWA protocol with ativan and phenobarbital  - continue vitamin replacement   - follow electrolytes and replace as needed  - continue short term course of keppra  - appreciate neurology recs and critical care management from [facility]    Type 2 DM  - blood glucose acceptable  - continue lantus 10 units nightly  - cover with ssi/hypoglycemia protocol    HTN  - BP acceptable  - continue current regimen    Alcoholic Cirrhosis  - appears reasonably well compensated, has secondary thrombocytopenia  - hold lasix and aldactone for now    Hypothyroidism  - continue levothyroxine    Lovenox 40 mg SC daily for DVT prophylaxis.  Full code.  Discussed with patient and nursing staff.  Anticipate discharge home timing yet to be determined.  Expected Discharge Date: 2024; Expected Discharge Time: 12:00 PM      Álvaro Resendez MD  [facility]  08/15/24  16:28 EDT    Portions of this text have been copied and I have reviewed them. They are accurate as of 8/15/2024      Electronically signed by Álvaro Resnedez MD at 08/15/24 1632          Consult Notes (last 5 days)        Venkat Rodriges MD at 24 1344        Consult Orders    1. Inpatient Nephrology Consult [[id number]] ordered by Mega Sigala MD at 24 1153                     INITIAL CONSULT NOTE      Patient Name: Anthony Lerma  : 1985  MRN: [id number]  Primary Care Physician: Provider, No Known  Date of admission: 2024    Patient Care Team:  Provider, No Known as PCP - General        Reason for Consult:       Established patient, prior LOVELY, chronic hyponatremia    Subjective    History of Present Illness:   Chief Complaint:   Chief Complaint   Patient presents with    Seizures     HISTORY:  Anthony Lerma is a 39 y.o. male known to our group outpatient who follows with us in the office  for previous LOVELY, hyponatremia. Other medical history significant for alcohol abuse, liver cirrhosis, DM2, hypothyroidism.     Patient presented to [facility] after a seizure, apparently has been drinking quite often, went to the [facility] for detox where he had a seizure, sent to ED. He was given ativan and phenobarbital for agitation, withdrawal. CT head and cervical spine WO without acute abnormality. Labs significant for sodium 132, platelets 60, UDS negative, ethanol level <10, renal function at baseline. Nephrology consulted for hyponatremia.    Review of systems:  Constitutional: weakness.  HEENT:  No headache, otalgia, itchy eyes, nasal discharge or sore throat.  Cardiac:  No chest pain, dyspnea, orthopnea or PND.  Chest:              No cough, phlegm or wheezing.  Abdomen:  No abdominal pain, nausea or vomiting.  Neuro:  No focal weakness, abnormal movements orseizure like activity.  :   No hematuria, no pyuria, no dysuria, no flank pain.  ROS was otherwise negative except as mentioned in the [place].       Personal History:     Past Medical History: History reviewed. No pertinent past medical history.    Surgical History:    History reviewed. No pertinent surgical history.    Family History: family history is not on file. Otherwise pertinent FHx was reviewed and unremarkable.     Social History:      Medications:  Prior to Admission medications    Medication Sig Start Date End Date Taking? Authorizing Provider   atorvastatin (LIPITOR) 20 MG tablet Take 1 tablet by mouth Daily.   Yes Lionel Cade MD   Continuous Glucose Sensor (Dexcom G7 Sensor) misc APPLY ONE SENSOR TOPICALLY EVERY 10 DAYS 7/17/24  Yes Lionel Cade MD   Cyanocobalamin 1000 MCG/ML kit Inject 1 mL as directed Every 30  (Thirty) Days.   Yes Lionel Cade MD   furosemide (LASIX) 20 MG tablet Take 1 tablet by mouth Daily. 6/21/24  Yes Lionel Cade MD   insulin aspart (NovoLOG FlexPen) 100 UNIT/ML solution pen-injector sc pen 5 UNITS BEFORE SUPPER (IF B/G GREATER THAN 200 USE 5 UNITS FOR EACH 50MG ABOVE 200, MAX 50 UNITS DAILY. 7/31/24  Yes Lionel Cade MD   Januvia 50 MG tablet Take 1 tablet by mouth Daily. 8/5/24  Yes Lionel Cade MD   Lanvladimir SoloStar 100 UNIT/ML injection pen INJECT 8 TO 12 UNITS SUBCUTANEOUSLY ONCE DAILY   Yes Lionel Cade MD   levothyroxine (SYNTHROID, LEVOTHROID) 112 MCG tablet Take 1 tablet by mouth Daily. 6/19/24  Yes Lionel Cade MD   nortriptyline (PAMELOR) 10 MG capsule Take 1 capsule by mouth Every Night. 7/30/24  Yes Lionel Cade MD   pregabalin (LYRICA) 300 MG capsule Take 1 capsule by mouth 2 (Two) Times a Day.   Yes Lionel Cade MD   spironolactone (ALDACTONE) 50 MG tablet Take 1 tablet by mouth Daily. 7/28/24  Yes Lionel Cade MD   vitamin D (ERGOCALCIFEROL) 1.25 MG (12862 UT) capsule capsule Take 1 capsule by mouth Every 30 (Thirty) Days.   Yes Lionel Cade MD     Scheduled Meds:[Held by provider] amitriptyline, 10 mg, Oral, Nightly  enoxaparin, 40 mg, Subcutaneous, Daily  folic acid, 1 mg, Oral, Daily  [Held by provider] furosemide, 20 mg, Oral, Daily  insulin glargine, 10 Units, Subcutaneous, Nightly  insulin regular, 2-9 Units, Subcutaneous, Q6H  levothyroxine, 112 mcg, Oral, Daily  multivitamin with minerals, 1 tablet, Oral, Daily  nicotine, 1 patch, Transdermal, Q24H  pregabalin, 300 mg, Oral, BID  sodium chloride, 10 mL, Intravenous, Q12H  [Held by provider] spironolactone, 50 mg, Oral, Daily  thiamine (B-1) IV, 200 mg, Intravenous, Q8H   Followed by  [START ON 8/20/2024] thiamine, 100 mg, Oral, Daily      Continuous Infusions:   PRN Meds:  acetaminophen    senna-docusate sodium **AND** polyethylene glycol  **AND** bisacodyl **AND** bisacodyl    Calcium Replacement - Follow Nurse / BPA Driven Protocol    cloNIDine    dextrose    dextrose    glucagon (human recombinant)    LORazepam **OR** LORazepam **OR** LORazepam **OR** LORazepam **OR** LORazepam **OR** LORazepam    Magnesium Standard Dose Replacement - Follow Nurse / BPA Driven Protocol    Magnesium Standard Dose Replacement - Follow Nurse / BPA Driven Protocol    nicotine polacrilex    nitroglycerin    nitroglycerin    ondansetron ODT **OR** ondansetron    Phosphorus Replacement - Follow Nurse / BPA Driven Protocol    Potassium Replacement - Follow Nurse / BPA Driven Protocol    Potassium Replacement - Follow Nurse / BPA Driven Protocol    [COMPLETED] Insert Peripheral IV **AND** sodium chloride    sodium chloride    sodium chloride  Allergies:  No Known Allergies    Objective   Exam:     Vital Signs  Temp:  [98.2 °F (36.8 °C)-98.9 °F (37.2 °C)] 98.2 °F (36.8 °C)  Heart Rate:  [] 113  Resp:  [18] 18  BP: (104-150)/(46-95) 110/77  SpO2:  [92 %-98 %] 95 %  on  Flow (L/min):  [2] 2;   Device (Oxygen Therapy): nasal cannula  Body mass index is 30.94 kg/m².  EXAM  General:  Agitated, no distress   Head:      Normocephalic and atraumatic.    Eyes:      PERRL/EOM intact, conjunctivae and sclerae clear without nystagmus.    Neck:      No masses, thyromegaly,  trachea central   Lungs:    Clear bilaterally to auscultation.    Heart:      Regular rate and rhythm, no murmur no gallop  Abd:        Soft, nontender, not distended, bowel sounds positive, no shifting dullness.  Msk:        No deformity or scoliosis noted of thoracic or lumbar spine.    Pulses:   Pulses normal in all 4 extremities.    Extremities:        No cyanosis or clubbing--no edema.    Neuro:    No focal deficits.   alert oriented x3  Skin:       Intact without lesions or rashes.    Psych:    Agitation, confusion     Results Review:  I have personally reviewed most recent Data :  BMP  @LABRCNTIP(creatinine:10)  CBC    Results from last 7 days   Lab Units 08/16/24  0320 08/15/24  0351 08/14/24  0311 08/13/24  0307   WBC 10*3/mm3 6.97 5.74 5.17 6.11   HEMOGLOBIN g/dL 14.2 14.3 14.7 15.1   PLATELETS 10*3/mm3 108* 75* 60* 57*     CMP   Results from last 7 days   Lab Units 08/16/24  0320 08/15/24  0351 08/14/24  0311 08/13/24  1344 08/13/24  0307   SODIUM mmol/L 131* 134* 137 132* 134*   POTASSIUM mmol/L 3.9 4.0 3.9 4.4 3.4*   CHLORIDE mmol/L 100 102 102 99 94*   CO2 mmol/L 16.3* 16.8* 21.7* 23.4 22.0   BUN mg/dL 14 12 9 10 11   CREATININE mg/dL 0.89 0.82 0.66* 0.80 0.82   GLUCOSE mg/dL 131* 114* 115* 160* 215*   ALBUMIN g/dL 3.4* 3.3* 3.4*  --  3.7   BILIRUBIN mg/dL 0.9 1.1 1.1  --  1.2   ALK PHOS U/L 97 90 81  --  98   AST (SGOT) U/L 55* 56* 54*  --  84*   ALT (SGPT) U/L 37 40 44*  --  63*     ABG      CT Cervical Spine Without Contrast    Result Date: 8/13/2024  Electronically signed by Álvaro Rutledge MD on 08-13-24 at 0515    CT Head Without Contrast    Result Date: 8/13/2024  Electronically signed by Álvaro Rutledge MD on 08-13-24 at 0514           Assessment & Plan   Assessment and Plan:         Alcohol withdrawal seizure    ETOH abuse    Type 2 diabetes mellitus    Essential hypertension    Alcoholic cirrhosis of liver with ascites    Hypothyroidism (acquired)    ASSESSMENT:  Chronic hyponatremia  NAGMA  Alcohol withdrawal  Seizure  Alcoholic cirrhosis  DM2  TCP      PLAN :     Patient admitted with seizure r/t alcohol withdrawal  Follows with Dr Venkat Rodriges outpatient for previous LOVELY, chronic hyponatremia in the setting of alcoholic cirrhosis  Sodium 134 on admission, now 131. Will perform hyponatremia workup including TSH, cortisol, serum osm, uric acid, urine osm, urine sodium  Will implement 1.5L/day fluid restriction, encourage high protein diet  K, Mag and Phos acceptable  Renal function intact, will check UA, UPCR  Monitor BMP daily  NAGMA, likely with component of respiratory  alkalosis in patient with cirrhosis, will check VBG  Receiving IV thiamine  BP trends labile, monitor  H&H stable but with TCP likely 2/2 cirrhosis, monitor  Strict I&O's  Avoid nephrotoxic medications including NSAIDs  We will follow and coordinate with team    Thank you for this consultation!      Princess Childs, MILY  [facility]  8/16/2024  13:44 EDT    Patient was seen earlier by  APRN. I have reviewed the history, data, problems, assessment and plan with the nurse practitioner during rounds and I concur with the history, exam, assessment and plan as described in the progress note with comments additions, revisions as noted.           Venkat Rodriges MD  8/16/2024  [facility]       Electronically signed by Venkat Rodriges MD at 08/16/24 3804

## 2024-08-20 NOTE — CONSULTS
Date of Consultation: 24    Referral Provider: Steve Ramírez, *     Reason for Consultation: PVCs, bigeminy     Encounter Provider: Luis Armando Olea MD    Group of Service: [facility]     Patient Name: Anthony Lerma    :1985    Chief complaint: Seizure activity.    History of Present Illness:      This is a pleasant 39 year old male with a past medical history significant for cirrhosis, type 2 diabetes, and hypothyroidism. He was seen at [place] [facility] in 2019 for hypertension, tachycardia, and ketones in his urine. His heart rate was in the 130s. It was felt to be related to acute illness.    He presented to the emergency department on 2024 after experiencing a seizure.  Patient reported that he had been drinking frequently and went to the [facility] to detox from alcohol.  That is when his seizure occurred and he was brought to the emergency department for further evaluation. It was felt that this was an alcohol withdrawal seizure.  He does have secondary thrombocytopenia secondary to alcoholic cirrhosis.  He also had acute on chronic hyponatremia.  He was treated for alcohol withdrawal, and started on Keppra.      The patient did have episodes of frequent PVCs, as well as some episodes of bigeminy on telemetry.  He has been completely asymptomatic from this, and has had no chest discomfort or palpitations.  He was unaware that these were occurring.  He also denied any new shortness of breath or exertional symptoms.  He did smoke up until he was admitted, but plans on quitting permanently.      Past Medical History:   Diagnosis Date    Alcohol abuse with withdrawal     Diabetes mellitus     Seizure          History reviewed. No pertinent surgical history.      No Known Allergies      No current facility-administered medications on file prior to encounter.     Current Outpatient Medications on File Prior to Encounter   Medication Sig Dispense Refill    atorvastatin (LIPITOR) 20  MG tablet Take 1 tablet by mouth Daily.      Continuous Glucose Sensor (Dexcom G7 Sensor) misc APPLY ONE SENSOR TOPICALLY EVERY 10 DAYS      Cyanocobalamin 1000 MCG/ML kit Inject 1 mL as directed Every 30 (Thirty) Days.      furosemide (LASIX) 20 MG tablet Take 1 tablet by mouth Daily.      insulin aspart (NovoLOG FlexPen) 100 UNIT/ML solution pen-injector sc pen 5 UNITS BEFORE SUPPER (IF B/G GREATER THAN 200 USE 5 UNITS FOR EACH 50MG ABOVE 200, MAX 50 UNITS DAILY.      Januvia 50 MG tablet Take 1 tablet by mouth Daily.      Lantus SoloStar 100 UNIT/ML injection pen INJECT 8 TO 12 UNITS SUBCUTANEOUSLY ONCE DAILY      levothyroxine (SYNTHROID, LEVOTHROID) 112 MCG tablet Take 1 tablet by mouth Daily.      nortriptyline (PAMELOR) 10 MG capsule Take 1 capsule by mouth Every Night.      pregabalin (LYRICA) 300 MG capsule Take 1 capsule by mouth 2 (Two) Times a Day.      spironolactone (ALDACTONE) 50 MG tablet Take 1 tablet by mouth Daily.      vitamin D (ERGOCALCIFEROL) 1.25 MG (88685 UT) capsule capsule Take 1 capsule by mouth Every 30 (Thirty) Days.           Social History     Socioeconomic History    Marital status:    Tobacco Use    Smoking status: Every Day     Types: Cigarettes   Vaping Use    Vaping status: Never Used   Substance and Sexual Activity    Alcohol use: Yes    Drug use: Never    Sexual activity: Yes     Partners: Female         History reviewed. No pertinent family history.    REVIEW OF SYSTEMS:   Pertinent positives are noted in the HPI above.  Otherwise, all other systems were reviewed, and are negative.     Objective:     Vitals:    08/19/24 1300 08/19/24 1840 08/19/24 2343 08/20/24 0726   BP: 116/81 118/82 130/86 117/78   BP Location: Right arm Right arm Right arm Right arm   Patient Position: Lying Sitting Lying Lying   Pulse: 93 90 83 88   Resp: 16 17 17 17   Temp: 98.2 °F (36.8 °C) 99.1 °F (37.3 °C) 98.4 °F (36.9 °C) 98.2 °F (36.8 °C)   TempSrc: Tympanic Oral Oral Oral   SpO2: 94% 99%  "99% 98%   Weight:    97.9 kg (215 lb 14.4 oz)   Height:         Body mass index is 30.98 kg/m².  Flowsheet Rows      Flowsheet Row First Filed Value   Admission Height 177.8 cm (70\") Documented at 08/13/2024 0226   Admission Weight 96.6 kg (213 lb) Documented at 08/13/2024 0226             General:    No acute distress, alert and oriented x4, pleasant                   Head:    Normocephalic, atraumatic.   Eyes:          Conjunctivae and sclerae normal, no icterus.   Throat:   No oral lesions, no thrush, oral mucosa moist.    Neck:   Supple, trachea midline.   Lungs:     Clear to auscultation bilaterally     Heart:    Regular rhythm and normal rate.  No murmurs, gallops, or rubs noted.   Abdomen:     Soft, non-tender, non-distended, positive bowel sounds.    Extremities:   No clubbing, cyanosis, or edema.     Pulses:   Pulses palpable and equal bilaterally.    Skin:   No bleeding or rash.   Neuro:   Non-focal.  Moves all extremities well.    Psychiatric:   Normal mood and affect.                 Lab Review:                Results from last 7 days   Lab Units 08/20/24  0527   SODIUM mmol/L 135*   POTASSIUM mmol/L 4.1   CHLORIDE mmol/L 101   CO2 mmol/L 27.0   BUN mg/dL 10   CREATININE mg/dL 0.79   GLUCOSE mg/dL 142*   CALCIUM mg/dL 9.0         Results from last 7 days   Lab Units 08/20/24  0528   WBC 10*3/mm3 4.83   HEMOGLOBIN g/dL 13.6   HEMATOCRIT % 39.4   PLATELETS 10*3/mm3 155             Results from last 7 days   Lab Units 08/20/24  0527   MAGNESIUM mg/dL 1.9           EKG (reviewed by me personally):            Assessment:   1.  Heavy alcohol use with alcohol withdrawal  2.  Alcohol withdrawal seizures  3.  Acute on chronic hyponatremia  4.  Alcoholic cirrhosis  5.  Secondary thrombocytopenia secondary to #4  6.  Tobacco use, none since admission and planning on quitting  7.  Type 2 diabetes  8.  Hypertension  9.  Asymptomatic PVCs    Plan:       I reviewed the patient's telemetry.  He does have intermittent " episodes of PVCs, as well as rare bigeminy.  He has had no ventricular tachycardia.  He is completely asymptomatic, and is unaware that these are occurring.  He has not had palpitations, syncope, chest pain, or shortness of breath out of the ordinary.  His EKG shows no significant changes or ischemic changes.  He does not appear to be in congestive heart failure.    Given that he is asymptomatic, and there are no ventricular runs, I feel that the PVCs are benign and unlikely to cause him any harm.  He was scheduled for discharge by the time I saw him today, and I have no objection to this.  I do not feel that he needs cardiac testing currently.  I did encourage him to remain tobacco free, and he has been wearing nicotine patches since admission here.  He states that he plans on quitting completely.    Thank you very much for this consult.    Nba Olea MD

## 2024-08-20 NOTE — PLAN OF CARE
Goal Outcome Evaluation:  Plan of Care Reviewed With: patient        Progress: improving  Outcome Evaluation: VSS. Put on 2L NC for a couple hours following desats. Denies any pain, soa, N/V. Blood sugar at hs of 117. 10 units lantus and box lunch given. Standby assist v. independent to bathroom for voids. No BM this shift.

## 2024-08-20 NOTE — DISCHARGE SUMMARY
Date of Discharge:  8/20/2024    PCP: Provider, No Known    Discharge Diagnosis:   Active Hospital Problems    Diagnosis  POA    Type 2 diabetes mellitus [E11.9]  Yes    Essential hypertension [I10]  Yes    Alcoholic cirrhosis of liver with ascites [K70.31]  Yes    Hypothyroidism (acquired) [E03.9]  Yes    ETOH abuse [F10.10]  Yes      Resolved Hospital Problems    Diagnosis Date Resolved POA    **Alcohol withdrawal seizure [F10.939, R56.9] 08/18/2024 Yes          Consults       Date and Time Order Name Status Description    8/19/2024  2:42 PM Inpatient Cardiology Consult      8/16/2024 11:53 AM Inpatient Nephrology Consult Completed     8/16/2024 10:49 AM Inpatient Nephrology Consult      8/13/2024  3:00 PM Inpatient Neurology Consult General Completed     8/13/2024 12:13 PM Inpatient Pulmonology Consult Completed     8/13/2024  7:33 AM Inpatient Neurology Consult General Completed     8/13/2024  5:21 AM [facility] (on-call MD unless specified) Details            Hospital Course  39 y.o. male with past medical history significant for type 2 diabetes mellitus, hypertension, alcoholic liver cirrhosis, thrombocytopenia, hypothyroidism, got admitted to the hospital, had alcohol withdrawal seizure, patient was placed on phenobarbital therapy, he is now out of the window for acute alcohol withdrawal.  Patient also finished a short-term course of Keppra as recommended by neurology.  Patient overall clinically has recovered to a point where he can be safely discharged from the hospital.  I have seen and examined patient at bedside, he will go to alcohol rehab facility.  Total time spent on discharge management for this patient is more than 30 minutes.  Plan of care was discussed with the patient and he has verbalized understanding.`        Temp:  [98.2 °F (36.8 °C)-99.1 °F (37.3 °C)] 98.2 °F (36.8 °C)  Heart Rate:  [83-93] 88  Resp:  [16-17] 17  BP: (116-130)/(78-86) 117/78  Body mass index is 30.98 kg/m².    Physical  Exam  General, awake and alert.  Head and ENT, normocephalic and atraumatic.  Lungs, symmetric expansion, equal air entry bilaterally.  Heart, regular rate and rhythm.  Abdomen, soft and nontender.  Extremities, no clubbing or cyanosis.  Neuro, no focal deficits.  Skin: Warm and no rash.  Psych, normal mood and affect.  Musculoskeletal, joint examination is grossly normal.    Disposition: Home or Self Care       Discharge Medications        New Medications        Instructions Start Date   folic acid 1 MG tablet  Commonly known as: FOLVITE   1 mg, Oral, Daily      multivitamin with minerals tablet tablet   1 tablet, Oral, Daily      thiamine 100 MG tablet  Commonly known as: VITAMIN B1   100 mg, Oral, Daily             Continue These Medications        Instructions Start Date   atorvastatin 20 MG tablet  Commonly known as: LIPITOR   20 mg, Oral, Daily      Cyanocobalamin 1000 MCG/ML kit   1,000 mcg, Injection, Every 30 Days      Dexcom G7 Sensor misc   APPLY ONE SENSOR TOPICALLY EVERY 10 DAYS      furosemide 20 MG tablet  Commonly known as: LASIX   20 mg, Oral, Daily      Januvia 50 MG tablet  Generic drug: SITagliptin   50 mg, Oral, Daily      Lantus SoloStar 100 UNIT/ML injection pen  Generic drug: Insulin Glargine   INJECT 8 TO 12 UNITS SUBCUTANEOUSLY ONCE DAILY      levothyroxine 112 MCG tablet  Commonly known as: SYNTHROID, LEVOTHROID   112 mcg, Oral, Daily      nortriptyline 10 MG capsule  Commonly known as: PAMELOR   10 mg, Oral, Nightly      NovoLOG FlexPen 100 UNIT/ML solution pen-injector sc pen  Generic drug: insulin aspart   5 UNITS BEFORE SUPPER (IF B/G GREATER THAN 200 USE 5 UNITS FOR EACH 50MG ABOVE 200, MAX 50 UNITS DAILY.      pregabalin 300 MG capsule  Commonly known as: LYRICA   300 mg, Oral, 2 Times Daily      spironolactone 50 MG tablet  Commonly known as: ALDACTONE   1 tablet, Oral, Daily      vitamin D 1.25 MG (07115 UT) capsule capsule  Commonly known as: ERGOCALCIFEROL   50,000 Units, Oral,  Every 30 Days              Diet Instructions       Diet: Diabetic Diets, Fluid Restriction (240 mL/tray) Diets; Consistent Carbohydrate; Regular (IDDSI 7); Thin (IDDSI 0); Other (Specify mL/day) (1200)      Discharge Diet:  Diabetic Diets  Fluid Restriction (240 mL/tray) Diets       Diabetic Diet: Consistent Carbohydrate    Texture: Regular (IDDSI 7)    Fluid Consistency: Thin (IDDSI 0)    Fluid Restriction Diet (240 mL/tray): Other (Specify mL/day) Comment - 1200           Activity Instructions       Activity as Tolerated             Additional Instructions for the Follow-ups that You Need to Schedule       Discharge Follow-up with PCP   As directed       Currently Documented PCP:    Provider, No Known    PCP Phone Number:    [phone]     Follow Up Details: Follow-up with PCP in 7 days.               Follow-up Information       Provider, No Known .    Why: Follow-up with PCP in 7 days.  Contact information:  [facility] [address] [phone]                          No future appointments.     Honorio Moralez MD  [facility]  08/20/24    Discharge time spent greater than 30 minutes.

## 2024-08-21 NOTE — OUTREACH NOTE
Prep Survey      Flowsheet Row Responses   [facility] patient discharged from? [place]   Is LACE score < 7 ? No   Eligibility Not Eligible   What are the reasons patient is not eligible? Other   Does the patient have one of the following disease processes/diagnoses(primary or secondary)? Other   Prep survey completed? Yes            CLAUDIO FERREIRA - Registered Nurse

## 2024-08-21 NOTE — PAYOR COMM NOTE
"Anthony Lerma (39 y.o. Male)        PLEASE SEE ATTACHED DC SUMMARY    REF#[id number]    THANK YOU    TULIO YODER LPN CCP   Date of Birth   1985    Social Security Number       Address   [address] [place] [address]    Home Phone   [phone]    MRN   [id number]       Jew   Unknown    Marital Status                               Admission Date   8/13/24    Admission Type   Emergency    Admitting Provider   Hayden Sol MD    Attending Provider       Department, Room/Bed   [address], E448/1       Discharge Date   8/20/2024    Discharge Disposition   Home or Self Care    Discharge Destination                                 Attending Provider: (none)   Allergies: No Known Allergies    Isolation: None   Infection: None   Code Status: Prior    Ht: 177.8 cm (70\")   Wt: 97.9 kg (215 lb 14.4 oz)    Admission Cmt: PT CAME FROM THE [place]   Principal Problem: Alcohol withdrawal seizure [F10.939,R56.9]                   Active Insurance as of 8/13/2024       Primary Coverage       Payor Plan Insurance Group Employer/Plan Group    [employer]CIAL A[employer] [id number]       Payor Plan Address Payor Plan Phone Number Payor Plan Fax Number Effective Dates    [address] [phone]  10/16/2022 - None Entered    [facility] [id number]         Subscriber Name Subscriber Birth Date Member ID       ANTHONY LERMA 1985 [id number]                     Emergency Contacts        (Rel.) Home Phone Work Phone Mobile Phone    LERMAHEIDI DIAZCIA (Spouse) -- -- [phone]    Paige Lerma (Mother) -- -- [phone]                 Discharge Summary        Honorio Moralez MD at 08/20/24 1133          Date of Discharge:  8/20/2024    PCP: Provider, No Known    Discharge Diagnosis:   Active Hospital Problems    Diagnosis  POA    Type 2 diabetes mellitus [E11.9]  Yes    Essential hypertension [I10]  Yes    Alcoholic cirrhosis of liver with ascites " [K70.31]  Yes    Hypothyroidism (acquired) [E03.9]  Yes    ETOH abuse [F10.10]  Yes      Resolved Hospital Problems    Diagnosis Date Resolved POA    **Alcohol withdrawal seizure [F10.939, R56.9] 08/18/2024 Yes          Consults       Date and Time Order Name Status Description    8/19/2024  2:42 PM Inpatient Cardiology Consult      8/16/2024 11:53 AM Inpatient Nephrology Consult Completed     8/16/2024 10:49 AM Inpatient Nephrology Consult      8/13/2024  3:00 PM Inpatient Neurology Consult General Completed     8/13/2024 12:13 PM Inpatient Pulmonology Consult Completed     8/13/2024  7:33 AM Inpatient Neurology Consult General Completed     8/13/2024  5:21 AM [facility] (on-call MD unless specified) Details            Hospital Course  39 y.o. male with past medical history significant for type 2 diabetes mellitus, hypertension, alcoholic liver cirrhosis, thrombocytopenia, hypothyroidism, got admitted to the hospital, had alcohol withdrawal seizure, patient was placed on phenobarbital therapy, he is now out of the window for acute alcohol withdrawal.  Patient also finished a short-term course of Keppra as recommended by neurology.  Patient overall clinically has recovered to a point where he can be safely discharged from the hospital.  I have seen and examined patient at bedside, he will go to alcohol rehab facility.  Total time spent on discharge management for this patient is more than 30 minutes.  Plan of care was discussed with the patient and he has verbalized understanding.`        Temp:  [98.2 °F (36.8 °C)-99.1 °F (37.3 °C)] 98.2 °F (36.8 °C)  Heart Rate:  [83-93] 88  Resp:  [16-17] 17  BP: (116-130)/(78-86) 117/78  Body mass index is 30.98 kg/m².    Physical Exam  General, awake and alert.  Head and ENT, normocephalic and atraumatic.  Lungs, symmetric expansion, equal air entry bilaterally.  Heart, regular rate and rhythm.  Abdomen, soft and nontender.  Extremities, no clubbing or cyanosis.  Neuro, no focal  deficits.  Skin: Warm and no rash.  Psych, normal mood and affect.  Musculoskeletal, joint examination is grossly normal.    Disposition: Home or Self Care       Discharge Medications        New Medications        Instructions Start Date   folic acid 1 MG tablet  Commonly known as: FOLVITE   1 mg, Oral, Daily      multivitamin with minerals tablet tablet   1 tablet, Oral, Daily      thiamine 100 MG tablet  Commonly known as: VITAMIN B1   100 mg, Oral, Daily             Continue These Medications        Instructions Start Date   atorvastatin 20 MG tablet  Commonly known as: LIPITOR   20 mg, Oral, Daily      Cyanocobalamin 1000 MCG/ML kit   1,000 mcg, Injection, Every 30 Days      Dexcom G7 Sensor misc   APPLY ONE SENSOR TOPICALLY EVERY 10 DAYS      furosemide 20 MG tablet  Commonly known as: LASIX   20 mg, Oral, Daily      Januvia 50 MG tablet  Generic drug: SITagliptin   50 mg, Oral, Daily      Lantus SoloStar 100 UNIT/ML injection pen  Generic drug: Insulin Glargine   INJECT 8 TO 12 UNITS SUBCUTANEOUSLY ONCE DAILY      levothyroxine 112 MCG tablet  Commonly known as: SYNTHROID, LEVOTHROID   112 mcg, Oral, Daily      nortriptyline 10 MG capsule  Commonly known as: PAMELOR   10 mg, Oral, Nightly      NovoLOG FlexPen 100 UNIT/ML solution pen-injector sc pen  Generic drug: insulin aspart   5 UNITS BEFORE SUPPER (IF B/G GREATER THAN 200 USE 5 UNITS FOR EACH 50MG ABOVE 200, MAX 50 UNITS DAILY.      pregabalin 300 MG capsule  Commonly known as: LYRICA   300 mg, Oral, 2 Times Daily      spironolactone 50 MG tablet  Commonly known as: ALDACTONE   1 tablet, Oral, Daily      vitamin D 1.25 MG (56033 UT) capsule capsule  Commonly known as: ERGOCALCIFEROL   50,000 Units, Oral, Every 30 Days              Diet Instructions       Diet: Diabetic Diets, Fluid Restriction (240 mL/tray) Diets; Consistent Carbohydrate; Regular (IDDSI 7); Thin (IDDSI 0); Other (Specify mL/day) (1200)      Discharge Diet:  Diabetic Diets  Fluid  Restriction (240 mL/tray) Diets       Diabetic Diet: Consistent Carbohydrate    Texture: Regular (IDDSI 7)    Fluid Consistency: Thin (IDDSI 0)    Fluid Restriction Diet (240 mL/tray): Other (Specify mL/day) Comment - 1200           Activity Instructions       Activity as Tolerated             Additional Instructions for the Follow-ups that You Need to Schedule       Discharge Follow-up with PCP   As directed       Currently Documented PCP:    Provider, No Known    PCP Phone Number:    [phone]     Follow Up Details: Follow-up with PCP in 7 days.               Follow-up Information       Provider, No Known .    Why: Follow-up with PCP in 7 days.  Contact information:  [facility] [address]  [phone]                          No future appointments.     Honorio Morrow MD  [facility]  08/20/24    Discharge time spent greater than 30 minutes.    Electronically signed by Honorio Morrow MD at 08/20/24 1135       Discharge Order (From admission, onward)       Start     Ordered    08/20/24 1133  Discharge patient  Once        Expected Discharge Date: 08/20/24   Discharge Disposition: Home or Self Care   Physician of Record for Attribution - Please select from Treatment Team: HONORIO MORROW [[id number]]   Review needed by CMO to determine Physician of Record: No      Question Answer Comment   Physician of Record for Attribution - Please select from Treatment Team HONORIO MORROW    Review needed by CMO to determine Physician of Record No        08/20/24 1133    08/18/24 1154  Discharge patient  Once,   Status:  Canceled        Expected Discharge Date: 08/18/24   Discharge Disposition: Home or Self Care   Physician of Record for Attribution - Please select from Treatment Team: REY NIXON [[id number]]   Review needed by CMO to determine Physician of Record: No      Question Answer Comment   Physician of Record for Attribution - Please select from Treatment Team  REY NIXON    Review needed by CMO to determine Physician of Record No        08/18/24 [id number]